# Patient Record
Sex: FEMALE | Race: WHITE | NOT HISPANIC OR LATINO | Employment: OTHER | URBAN - METROPOLITAN AREA
[De-identification: names, ages, dates, MRNs, and addresses within clinical notes are randomized per-mention and may not be internally consistent; named-entity substitution may affect disease eponyms.]

---

## 2017-06-19 ENCOUNTER — OFFICE VISIT (OUTPATIENT)
Dept: NEUROLOGY | Facility: CLINIC | Age: 57
End: 2017-06-19

## 2017-06-19 VITALS
OXYGEN SATURATION: 98 % | HEIGHT: 68 IN | WEIGHT: 238 LBS | HEART RATE: 89 BPM | BODY MASS INDEX: 36.07 KG/M2 | DIASTOLIC BLOOD PRESSURE: 88 MMHG | SYSTOLIC BLOOD PRESSURE: 140 MMHG

## 2017-06-19 DIAGNOSIS — I67.850 CADASIL (CEREBRAL AD ARTERIOPATHY W INFARCTS AND LEUKOENCEPHALOPATHY): Primary | ICD-10-CM

## 2017-06-19 DIAGNOSIS — G43.119 INTRACTABLE MIGRAINE WITH AURA WITHOUT STATUS MIGRAINOSUS: ICD-10-CM

## 2017-06-19 PROCEDURE — 99213 OFFICE O/P EST LOW 20 MIN: CPT | Performed by: NURSE PRACTITIONER

## 2017-06-19 RX ORDER — PROMETHAZINE HYDROCHLORIDE 25 MG/1
TABLET ORAL
Qty: 30 TABLET | Refills: 0 | Status: SHIPPED | OUTPATIENT
Start: 2017-06-19 | End: 2017-11-07

## 2017-06-19 NOTE — PROGRESS NOTES
Subjective:     Patient ID: Bhavana Teixeira is a 56 y.o. female presenting for follow up for CADASIL and migraines. The patient saw Dr. Boyd on 2016. She is a previous patient of Dr. Brown and Dr. Toth. She has a long standing history of migraines. They previously were occurring about twice per week but recently she reports only about one per month. Dr. Boyd took her off of Imitrex due to the increased risk of stroke and started her on promethazine. The patient states the prescription was never sent so she has not tried this. She did stop the Imitrex though.     She has some progressive memory problems over the last couple of years but denies episodes of one-sided weakness or numbness or visual loss. She has had MRI scans of her brain showing very prominent white matter disease consistent with CADASIL per report. There is no reference to a notch 3 lab test result. She has a family history of CADASIL including her older sister, who  at age 50. Her youngest sister also has it and her brother at age 63 is severely incapacitated with dementia and is wheelchair-bound due to CADASIL. She takes Namenda 5 mg twice a day and donepezil 10 mg daily for memory loss. She has a history of seizures associated with CADASILShe is also on Zonegran 300 mg daily in divided doses, Keppra 1.5 tablets twice daily and Neurontin 600 mg twice a day.     MRI 2015 with DWI abnormality left frontal and parietal regions. Repeat MRI 2016 with no acute changes. Findings consistent with CADASIL.     She has a history of outbursts of crying which she was placed on Prozac 20 mg daily which has helped. Dr. Boyd discussed the possibility of adding Nuedexta with her at the last visit and she chose not to at this time but will consider in the future if needed.     Memory Loss   Associated symptoms include abdominal pain, fatigue, headaches and neck pain. Pertinent negatives include no chest pain, nausea, numbness, vomiting or  weakness.     The following portions of the patient's history were reviewed and updated as appropriate: allergies, current medications, past family history, past medical history, past social history, past surgical history and problem list.    Review of Systems   Constitutional: Positive for fatigue. Negative for activity change and appetite change.   HENT: Negative for facial swelling, hearing loss and trouble swallowing.    Eyes: Positive for pain. Negative for photophobia and visual disturbance.   Respiratory: Negative for choking, chest tightness and shortness of breath.    Cardiovascular: Negative for chest pain and leg swelling.   Gastrointestinal: Positive for abdominal pain. Negative for nausea and vomiting.   Endocrine: Positive for polydipsia. Negative for polyphagia.   Genitourinary: Positive for difficulty urinating and frequency.   Musculoskeletal: Positive for back pain, gait problem and neck pain.   Neurological: Positive for tremors and headaches. Negative for dizziness, seizures, syncope, facial asymmetry, speech difficulty, weakness, light-headedness and numbness.   Hematological: Negative for adenopathy. Does not bruise/bleed easily.   Psychiatric/Behavioral: Positive for confusion and decreased concentration. Negative for agitation, behavioral problems, dysphoric mood, hallucinations, self-injury, sleep disturbance and suicidal ideas. The patient is not nervous/anxious and is not hyperactive.         Objective:    Neurologic Exam  Mental status, awake, alert, oriented, and conversant. No evidence for pseudobulbar affect or depression.  HCF: No aphasia. MMSE last visit was 27/30, not repeated today.   Cranial nerves: 2-12 intact.  Motor: Normal tone, bulk, and power diffusely.  Reflexes 1 diffusely.  Sensory: Light touch and pinprick intact throughout.   Cerebellar: finger-to-nose and rapid movements intact. No tremor noted.  Gait and station: Broad based, reduced arm swing bilaterally. Negative  Romberg.    Physical Exam  General appearance: Obese white female in no acute distress.  HEENT: Normocephalic, no evidence of trauma.   Neck: Supple. No thyromegaly.  Heart: Regular rate and rhythm no murmurs.    Assessment/Plan:     Bhavana was seen today for cadasil and memory loss.    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       1. CADASIL - no specific treatment available. Continue Aricept 10 mg daily and Namenda 5 mg daily. Continue zonisamide 300 mg daily single nightly dose. Continue Keppra 750 mg twice daily and gabapentin 600 mg twice daily. Continue ASA 81 mg daily. She did not stop amantadine as discussed with Dr. Boyd at her last visit. She will stop this now and see if her tremor clearly responding. Will resend promethazine 25 mg every 6 hours as needed for migraine headaches. She is to take this in place of Imitrex. Continue Fluoxetine and Meloxicam via primary MD. F/U 6 months.

## 2017-11-07 ENCOUNTER — APPOINTMENT (OUTPATIENT)
Dept: CT IMAGING | Facility: HOSPITAL | Age: 57
End: 2017-11-07

## 2017-11-07 ENCOUNTER — TELEPHONE (OUTPATIENT)
Dept: NEUROLOGY | Facility: CLINIC | Age: 57
End: 2017-11-07

## 2017-11-07 ENCOUNTER — HOSPITAL ENCOUNTER (OUTPATIENT)
Facility: HOSPITAL | Age: 57
Setting detail: OBSERVATION
Discharge: HOME | End: 2017-11-09
Attending: EMERGENCY MEDICINE | Admitting: INTERNAL MEDICINE

## 2017-11-07 DIAGNOSIS — I67.850 CEREBRAL AUTOSOMAL DOMINANT ARTERIOPATHY WITH SUBCORTICAL INFARCTS AND LEUKOENCEPHALOPATHY: ICD-10-CM

## 2017-11-07 DIAGNOSIS — G45.1 HEMISPHERIC CAROTID ARTERY SYNDROME: Primary | ICD-10-CM

## 2017-11-07 LAB
ALBUMIN SERPL-MCNC: 4.7 G/DL (ref 3.5–5.2)
ALBUMIN/GLOB SERPL: 1.3 G/DL
ALP SERPL-CCNC: 143 U/L (ref 39–117)
ALT SERPL W P-5'-P-CCNC: 10 U/L (ref 1–33)
ANION GAP SERPL CALCULATED.3IONS-SCNC: 12.5 MMOL/L
AST SERPL-CCNC: 19 U/L (ref 1–32)
BASOPHILS # BLD AUTO: 0.02 10*3/MM3 (ref 0–0.2)
BASOPHILS NFR BLD AUTO: 0.3 % (ref 0–1.5)
BILIRUB SERPL-MCNC: 0.2 MG/DL (ref 0.1–1.2)
BUN BLD-MCNC: 16 MG/DL (ref 6–20)
BUN/CREAT SERPL: 17 (ref 7–25)
CALCIUM SPEC-SCNC: 9.1 MG/DL (ref 8.6–10.5)
CHLORIDE SERPL-SCNC: 107 MMOL/L (ref 98–107)
CO2 SERPL-SCNC: 26.5 MMOL/L (ref 22–29)
CREAT BLD-MCNC: 0.94 MG/DL (ref 0.57–1)
DEPRECATED RDW RBC AUTO: 42.8 FL (ref 37–54)
EOSINOPHIL # BLD AUTO: 0.2 10*3/MM3 (ref 0–0.7)
EOSINOPHIL NFR BLD AUTO: 3.2 % (ref 0.3–6.2)
ERYTHROCYTE [DISTWIDTH] IN BLOOD BY AUTOMATED COUNT: 13.4 % (ref 11.7–13)
GFR SERPL CREATININE-BSD FRML MDRD: 61 ML/MIN/1.73
GLOBULIN UR ELPH-MCNC: 3.5 GM/DL
GLUCOSE BLD-MCNC: 137 MG/DL (ref 65–99)
HCT VFR BLD AUTO: 45.1 % (ref 35.6–45.5)
HGB BLD-MCNC: 14.4 G/DL (ref 11.9–15.5)
IMM GRANULOCYTES # BLD: 0 10*3/MM3 (ref 0–0.03)
IMM GRANULOCYTES NFR BLD: 0 % (ref 0–0.5)
INR PPP: 1.04 (ref 0.9–1.1)
LYMPHOCYTES # BLD AUTO: 1.89 10*3/MM3 (ref 0.9–4.8)
LYMPHOCYTES NFR BLD AUTO: 30.6 % (ref 19.6–45.3)
MCH RBC QN AUTO: 27.8 PG (ref 26.9–32)
MCHC RBC AUTO-ENTMCNC: 31.9 G/DL (ref 32.4–36.3)
MCV RBC AUTO: 87.1 FL (ref 80.5–98.2)
MONOCYTES # BLD AUTO: 0.45 10*3/MM3 (ref 0.2–1.2)
MONOCYTES NFR BLD AUTO: 7.3 % (ref 5–12)
NEUTROPHILS # BLD AUTO: 3.62 10*3/MM3 (ref 1.9–8.1)
NEUTROPHILS NFR BLD AUTO: 58.6 % (ref 42.7–76)
PLATELET # BLD AUTO: 202 10*3/MM3 (ref 140–500)
PMV BLD AUTO: 11.2 FL (ref 6–12)
POTASSIUM BLD-SCNC: 3.5 MMOL/L (ref 3.5–5.2)
PROT SERPL-MCNC: 8.2 G/DL (ref 6–8.5)
PROTHROMBIN TIME: 13.2 SECONDS (ref 11.7–14.2)
RBC # BLD AUTO: 5.18 10*6/MM3 (ref 3.9–5.2)
SODIUM BLD-SCNC: 146 MMOL/L (ref 136–145)
WBC NRBC COR # BLD: 6.18 10*3/MM3 (ref 4.5–10.7)

## 2017-11-07 PROCEDURE — 80053 COMPREHEN METABOLIC PANEL: CPT | Performed by: EMERGENCY MEDICINE

## 2017-11-07 PROCEDURE — G0378 HOSPITAL OBSERVATION PER HR: HCPCS

## 2017-11-07 PROCEDURE — 85025 COMPLETE CBC W/AUTO DIFF WBC: CPT | Performed by: EMERGENCY MEDICINE

## 2017-11-07 PROCEDURE — 99284 EMERGENCY DEPT VISIT MOD MDM: CPT

## 2017-11-07 PROCEDURE — 85610 PROTHROMBIN TIME: CPT | Performed by: EMERGENCY MEDICINE

## 2017-11-07 PROCEDURE — 70450 CT HEAD/BRAIN W/O DYE: CPT

## 2017-11-07 RX ORDER — ASPIRIN 325 MG
325 TABLET ORAL ONCE
Status: COMPLETED | OUTPATIENT
Start: 2017-11-07 | End: 2017-11-07

## 2017-11-07 RX ORDER — ACETAMINOPHEN 650 MG/1
650 SUPPOSITORY RECTAL EVERY 4 HOURS PRN
Status: DISCONTINUED | OUTPATIENT
Start: 2017-11-07 | End: 2017-11-09 | Stop reason: HOSPADM

## 2017-11-07 RX ORDER — LANSOPRAZOLE 30 MG/1
30 CAPSULE, DELAYED RELEASE ORAL DAILY
COMMUNITY

## 2017-11-07 RX ORDER — CYANOCOBALAMIN 1000 UG/ML
1000 INJECTION, SOLUTION INTRAMUSCULAR; SUBCUTANEOUS
COMMUNITY

## 2017-11-07 RX ORDER — SODIUM CHLORIDE 0.9 % (FLUSH) 0.9 %
1-10 SYRINGE (ML) INJECTION AS NEEDED
Status: DISCONTINUED | OUTPATIENT
Start: 2017-11-07 | End: 2017-11-09 | Stop reason: HOSPADM

## 2017-11-07 RX ORDER — ATORVASTATIN CALCIUM 80 MG/1
80 TABLET, FILM COATED ORAL NIGHTLY
Status: DISCONTINUED | OUTPATIENT
Start: 2017-11-08 | End: 2017-11-08

## 2017-11-07 RX ORDER — LEVOTHYROXINE SODIUM 0.12 MG/1
125 TABLET ORAL DAILY
COMMUNITY

## 2017-11-07 RX ORDER — BISACODYL 5 MG/1
5 TABLET, DELAYED RELEASE ORAL DAILY PRN
Status: DISCONTINUED | OUTPATIENT
Start: 2017-11-07 | End: 2017-11-09 | Stop reason: HOSPADM

## 2017-11-07 RX ORDER — CALCIUM CARBONATE 200(500)MG
2 TABLET,CHEWABLE ORAL 2 TIMES DAILY PRN
Status: DISCONTINUED | OUTPATIENT
Start: 2017-11-07 | End: 2017-11-09 | Stop reason: HOSPADM

## 2017-11-07 RX ORDER — MAGNESIUM OXIDE 400 MG/1
400 TABLET ORAL DAILY
COMMUNITY

## 2017-11-07 RX ORDER — SODIUM CHLORIDE 9 MG/ML
75 INJECTION, SOLUTION INTRAVENOUS CONTINUOUS
Status: DISCONTINUED | OUTPATIENT
Start: 2017-11-08 | End: 2017-11-09 | Stop reason: HOSPADM

## 2017-11-07 RX ORDER — ACETAMINOPHEN 325 MG/1
650 TABLET ORAL EVERY 4 HOURS PRN
Status: DISCONTINUED | OUTPATIENT
Start: 2017-11-07 | End: 2017-11-09 | Stop reason: HOSPADM

## 2017-11-07 RX ORDER — ASPIRIN 325 MG
325 TABLET ORAL DAILY
Status: DISCONTINUED | OUTPATIENT
Start: 2017-11-08 | End: 2017-11-08

## 2017-11-07 RX ORDER — ASPIRIN 300 MG/1
300 SUPPOSITORY RECTAL DAILY
Status: DISCONTINUED | OUTPATIENT
Start: 2017-11-08 | End: 2017-11-08

## 2017-11-07 RX ORDER — ONDANSETRON 4 MG/1
4 TABLET, FILM COATED ORAL EVERY 6 HOURS PRN
Status: DISCONTINUED | OUTPATIENT
Start: 2017-11-07 | End: 2017-11-09 | Stop reason: HOSPADM

## 2017-11-07 RX ORDER — MELOXICAM 15 MG/1
15 TABLET ORAL DAILY
COMMUNITY
End: 2017-11-09 | Stop reason: HOSPADM

## 2017-11-07 RX ORDER — ONDANSETRON 4 MG/1
4 TABLET, ORALLY DISINTEGRATING ORAL EVERY 6 HOURS PRN
Status: DISCONTINUED | OUTPATIENT
Start: 2017-11-07 | End: 2017-11-09 | Stop reason: HOSPADM

## 2017-11-07 RX ORDER — ONDANSETRON 2 MG/ML
4 INJECTION INTRAMUSCULAR; INTRAVENOUS EVERY 6 HOURS PRN
Status: DISCONTINUED | OUTPATIENT
Start: 2017-11-07 | End: 2017-11-09 | Stop reason: HOSPADM

## 2017-11-07 RX ORDER — SODIUM CHLORIDE 0.9 % (FLUSH) 0.9 %
10 SYRINGE (ML) INJECTION AS NEEDED
Status: DISCONTINUED | OUTPATIENT
Start: 2017-11-07 | End: 2017-11-07

## 2017-11-07 RX ORDER — NITROGLYCERIN 0.4 MG/1
0.4 TABLET SUBLINGUAL
Status: DISCONTINUED | OUTPATIENT
Start: 2017-11-07 | End: 2017-11-09 | Stop reason: HOSPADM

## 2017-11-07 RX ORDER — ZONISAMIDE 100 MG/1
300 CAPSULE ORAL NIGHTLY
COMMUNITY
End: 2018-08-03 | Stop reason: SDUPTHER

## 2017-11-07 RX ADMIN — ASPIRIN 325 MG: 325 TABLET ORAL at 21:40

## 2017-11-07 RX ADMIN — SODIUM CHLORIDE 1000 ML: 9 INJECTION, SOLUTION INTRAVENOUS at 20:17

## 2017-11-07 RX ADMIN — SODIUM CHLORIDE 75 ML/HR: 9 INJECTION, SOLUTION INTRAVENOUS at 23:45

## 2017-11-07 NOTE — TELEPHONE ENCOUNTER
I spoke with the patient.  She has new symptoms.  There is some dysarthria and some right-sided weakness which appear new.  She has a lot of crying episodes which is more nonspecific from her pseudobulbar affect.    She had previously been evaluated and followed in the [facility] system.  We don't have any radiographs in [facility].  I told her that this should be evaluated in the emergency room.  She is on aspirin.    Rg

## 2017-11-08 ENCOUNTER — APPOINTMENT (OUTPATIENT)
Dept: MRI IMAGING | Facility: HOSPITAL | Age: 57
End: 2017-11-08
Attending: INTERNAL MEDICINE

## 2017-11-08 ENCOUNTER — APPOINTMENT (OUTPATIENT)
Dept: CARDIOLOGY | Facility: HOSPITAL | Age: 57
End: 2017-11-08
Attending: INTERNAL MEDICINE

## 2017-11-08 PROBLEM — R73.9 HYPERGLYCEMIA: Status: ACTIVE | Noted: 2017-11-08

## 2017-11-08 PROBLEM — E87.0 HYPERNATREMIA: Status: ACTIVE | Noted: 2017-11-08

## 2017-11-08 PROBLEM — Z66 DNR (DO NOT RESUSCITATE): Status: ACTIVE | Noted: 2017-11-08

## 2017-11-08 PROBLEM — G45.9 TRANSIENT ISCHEMIC ATTACK: Status: ACTIVE | Noted: 2017-11-08

## 2017-11-08 LAB
ANION GAP SERPL CALCULATED.3IONS-SCNC: 14.8 MMOL/L
ASCENDING AORTA: 3.3 CM
BASOPHILS # BLD AUTO: 0.02 10*3/MM3 (ref 0–0.2)
BASOPHILS NFR BLD AUTO: 0.3 % (ref 0–1.5)
BH CV ECHO MEAS - ACS: 2.3 CM
BH CV ECHO MEAS - AO MAX PG (FULL): 4.4 MMHG
BH CV ECHO MEAS - AO MAX PG: 6.3 MMHG
BH CV ECHO MEAS - AO MEAN PG (FULL): 2 MMHG
BH CV ECHO MEAS - AO MEAN PG: 3 MMHG
BH CV ECHO MEAS - AO ROOT AREA (BSA CORRECTED): 1.4
BH CV ECHO MEAS - AO ROOT AREA: 8 CM^2
BH CV ECHO MEAS - AO ROOT DIAM: 3.2 CM
BH CV ECHO MEAS - AO V2 MAX: 125 CM/SEC
BH CV ECHO MEAS - AO V2 MEAN: 85.1 CM/SEC
BH CV ECHO MEAS - AO V2 VTI: 27 CM
BH CV ECHO MEAS - ASC AORTA: 3.3 CM
BH CV ECHO MEAS - AVA(I,A): 1.9 CM^2
BH CV ECHO MEAS - AVA(I,D): 1.9 CM^2
BH CV ECHO MEAS - AVA(V,A): 1.7 CM^2
BH CV ECHO MEAS - AVA(V,D): 1.7 CM^2
BH CV ECHO MEAS - BSA(HAYCOCK): 2.3 M^2
BH CV ECHO MEAS - BSA: 2.2 M^2
BH CV ECHO MEAS - BZI_BMI: 36.8 KILOGRAMS/M^2
BH CV ECHO MEAS - BZI_METRIC_HEIGHT: 172.7 CM
BH CV ECHO MEAS - BZI_METRIC_WEIGHT: 109.8 KG
BH CV ECHO MEAS - CONTRAST EF (2CH): 63.4 ML/M^2
BH CV ECHO MEAS - CONTRAST EF 4CH: 62.1 ML/M^2
BH CV ECHO MEAS - EDV(CUBED): 148.9 ML
BH CV ECHO MEAS - EDV(MOD-SP2): 123 ML
BH CV ECHO MEAS - EDV(MOD-SP4): 132 ML
BH CV ECHO MEAS - EDV(TEICH): 135.3 ML
BH CV ECHO MEAS - EF(CUBED): 73.6 %
BH CV ECHO MEAS - EF(MOD-SP2): 63.4 %
BH CV ECHO MEAS - EF(MOD-SP4): 62.1 %
BH CV ECHO MEAS - EF(TEICH): 65 %
BH CV ECHO MEAS - ESV(CUBED): 39.3 ML
BH CV ECHO MEAS - ESV(MOD-SP2): 45 ML
BH CV ECHO MEAS - ESV(MOD-SP4): 50 ML
BH CV ECHO MEAS - ESV(TEICH): 47.4 ML
BH CV ECHO MEAS - FS: 35.8 %
BH CV ECHO MEAS - IVS/LVPW: 1
BH CV ECHO MEAS - IVSD: 1.1 CM
BH CV ECHO MEAS - LAT PEAK E' VEL: 8 CM/SEC
BH CV ECHO MEAS - LV DIASTOLIC VOL/BSA (35-75): 59.6 ML/M^2
BH CV ECHO MEAS - LV MASS(C)D: 227.7 GRAMS
BH CV ECHO MEAS - LV MASS(C)DI: 102.8 GRAMS/M^2
BH CV ECHO MEAS - LV MAX PG: 1.9 MMHG
BH CV ECHO MEAS - LV MEAN PG: 1 MMHG
BH CV ECHO MEAS - LV SYSTOLIC VOL/BSA (12-30): 22.6 ML/M^2
BH CV ECHO MEAS - LV V1 MAX: 68.7 CM/SEC
BH CV ECHO MEAS - LV V1 MEAN: 46.2 CM/SEC
BH CV ECHO MEAS - LV V1 VTI: 16.6 CM
BH CV ECHO MEAS - LVIDD: 5.3 CM
BH CV ECHO MEAS - LVIDS: 3.4 CM
BH CV ECHO MEAS - LVLD AP2: 8.6 CM
BH CV ECHO MEAS - LVLD AP4: 8.7 CM
BH CV ECHO MEAS - LVLS AP2: 6.6 CM
BH CV ECHO MEAS - LVLS AP4: 7.6 CM
BH CV ECHO MEAS - LVOT AREA (M): 3.1 CM^2
BH CV ECHO MEAS - LVOT AREA: 3.1 CM^2
BH CV ECHO MEAS - LVOT DIAM: 2 CM
BH CV ECHO MEAS - LVPWD: 1.1 CM
BH CV ECHO MEAS - MED PEAK E' VEL: 6 CM/SEC
BH CV ECHO MEAS - MV A DUR: 0.14 SEC
BH CV ECHO MEAS - MV A MAX VEL: 62.1 CM/SEC
BH CV ECHO MEAS - MV DEC SLOPE: 238 CM/SEC^2
BH CV ECHO MEAS - MV DEC TIME: 0.34 SEC
BH CV ECHO MEAS - MV E MAX VEL: 57.2 CM/SEC
BH CV ECHO MEAS - MV E/A: 0.92
BH CV ECHO MEAS - MV MAX PG: 2.3 MMHG
BH CV ECHO MEAS - MV MEAN PG: 1 MMHG
BH CV ECHO MEAS - MV P1/2T MAX VEL: 79.4 CM/SEC
BH CV ECHO MEAS - MV P1/2T: 97.7 MSEC
BH CV ECHO MEAS - MV V2 MAX: 75.3 CM/SEC
BH CV ECHO MEAS - MV V2 MEAN: 47.1 CM/SEC
BH CV ECHO MEAS - MV V2 VTI: 27.2 CM
BH CV ECHO MEAS - MVA P1/2T LCG: 2.8 CM^2
BH CV ECHO MEAS - MVA(P1/2T): 2.3 CM^2
BH CV ECHO MEAS - MVA(VTI): 1.9 CM^2
BH CV ECHO MEAS - PA ACC TIME: 0.1 SEC
BH CV ECHO MEAS - PA MAX PG (FULL): 1.6 MMHG
BH CV ECHO MEAS - PA MAX PG: 2.5 MMHG
BH CV ECHO MEAS - PA PR(ACCEL): 33.1 MMHG
BH CV ECHO MEAS - PA V2 MAX: 78.7 CM/SEC
BH CV ECHO MEAS - PULM A REVS DUR: 0.12 SEC
BH CV ECHO MEAS - PULM A REVS VEL: 37.2 CM/SEC
BH CV ECHO MEAS - PULM DIAS VEL: 34.9 CM/SEC
BH CV ECHO MEAS - PULM S/D: 0.85
BH CV ECHO MEAS - PULM SYS VEL: 29.8 CM/SEC
BH CV ECHO MEAS - PVA(V,A): 2.7 CM^2
BH CV ECHO MEAS - PVA(V,D): 2.7 CM^2
BH CV ECHO MEAS - QP/QS: 0.97
BH CV ECHO MEAS - RAP SYSTOLE: 8 MMHG
BH CV ECHO MEAS - RV MAX PG: 0.89 MMHG
BH CV ECHO MEAS - RV MEAN PG: 0 MMHG
BH CV ECHO MEAS - RV V1 MAX: 47.3 CM/SEC
BH CV ECHO MEAS - RV V1 MEAN: 26.9 CM/SEC
BH CV ECHO MEAS - RV V1 VTI: 11.2 CM
BH CV ECHO MEAS - RVOT AREA: 4.5 CM^2
BH CV ECHO MEAS - RVOT DIAM: 2.4 CM
BH CV ECHO MEAS - SI(AO): 98 ML/M^2
BH CV ECHO MEAS - SI(CUBED): 49.4 ML/M^2
BH CV ECHO MEAS - SI(LVOT): 23.5 ML/M^2
BH CV ECHO MEAS - SI(MOD-SP2): 35.2 ML/M^2
BH CV ECHO MEAS - SI(MOD-SP4): 37 ML/M^2
BH CV ECHO MEAS - SI(TEICH): 39.7 ML/M^2
BH CV ECHO MEAS - SUP REN AO DIAM: 2.2 CM
BH CV ECHO MEAS - SV(AO): 217.1 ML
BH CV ECHO MEAS - SV(CUBED): 109.6 ML
BH CV ECHO MEAS - SV(LVOT): 52.2 ML
BH CV ECHO MEAS - SV(MOD-SP2): 78 ML
BH CV ECHO MEAS - SV(MOD-SP4): 82 ML
BH CV ECHO MEAS - SV(RVOT): 50.7 ML
BH CV ECHO MEAS - SV(TEICH): 87.9 ML
BH CV ECHO MEAS - TAPSE (>1.6): 3 CM2
BH CV VAS BP RIGHT ARM: NORMAL MMHG
BH CV XLRA - RV BASE: 3.6 CM
BH CV XLRA - TDI S': 13 CM/SEC
BUN BLD-MCNC: 12 MG/DL (ref 6–20)
BUN/CREAT SERPL: 12.9 (ref 7–25)
CALCIUM SPEC-SCNC: 9.1 MG/DL (ref 8.6–10.5)
CHLORIDE SERPL-SCNC: 108 MMOL/L (ref 98–107)
CHOLEST SERPL-MCNC: 152 MG/DL (ref 0–200)
CO2 SERPL-SCNC: 18.2 MMOL/L (ref 22–29)
CREAT BLD-MCNC: 0.93 MG/DL (ref 0.57–1)
DEPRECATED RDW RBC AUTO: 43.4 FL (ref 37–54)
E/E' RATIO: 9
EOSINOPHIL # BLD AUTO: 0.13 10*3/MM3 (ref 0–0.7)
EOSINOPHIL NFR BLD AUTO: 1.9 % (ref 0.3–6.2)
ERYTHROCYTE [DISTWIDTH] IN BLOOD BY AUTOMATED COUNT: 13.6 % (ref 11.7–13)
GFR SERPL CREATININE-BSD FRML MDRD: 62 ML/MIN/1.73
GLUCOSE BLD-MCNC: 120 MG/DL (ref 65–99)
HBA1C MFR BLD: 5.7 % (ref 4.8–5.6)
HCT VFR BLD AUTO: 43.6 % (ref 35.6–45.5)
HDLC SERPL-MCNC: 58 MG/DL (ref 40–60)
HGB BLD-MCNC: 13.8 G/DL (ref 11.9–15.5)
IMM GRANULOCYTES # BLD: 0.02 10*3/MM3 (ref 0–0.03)
IMM GRANULOCYTES NFR BLD: 0.3 % (ref 0–0.5)
LDLC SERPL CALC-MCNC: 77 MG/DL (ref 0–100)
LDLC/HDLC SERPL: 1.33 {RATIO}
LEFT ATRIUM VOLUME INDEX: 23 ML/M2
LV EF 2D ECHO EST: 62 %
LYMPHOCYTES # BLD AUTO: 1.82 10*3/MM3 (ref 0.9–4.8)
LYMPHOCYTES NFR BLD AUTO: 26.1 % (ref 19.6–45.3)
MAXIMAL PREDICTED HEART RATE: 161 BPM
MCH RBC QN AUTO: 27.8 PG (ref 26.9–32)
MCHC RBC AUTO-ENTMCNC: 31.7 G/DL (ref 32.4–36.3)
MCV RBC AUTO: 87.9 FL (ref 80.5–98.2)
MONOCYTES # BLD AUTO: 0.52 10*3/MM3 (ref 0.2–1.2)
MONOCYTES NFR BLD AUTO: 7.5 % (ref 5–12)
NEUTROPHILS # BLD AUTO: 4.45 10*3/MM3 (ref 1.9–8.1)
NEUTROPHILS NFR BLD AUTO: 63.9 % (ref 42.7–76)
PLATELET # BLD AUTO: 188 10*3/MM3 (ref 140–500)
PMV BLD AUTO: 11.2 FL (ref 6–12)
POTASSIUM BLD-SCNC: 3.6 MMOL/L (ref 3.5–5.2)
RBC # BLD AUTO: 4.96 10*6/MM3 (ref 3.9–5.2)
SODIUM BLD-SCNC: 141 MMOL/L (ref 136–145)
STRESS TARGET HR: 137 BPM
TRIGL SERPL-MCNC: 84 MG/DL (ref 0–150)
TSH SERPL DL<=0.05 MIU/L-ACNC: 1.6 MIU/ML (ref 0.27–4.2)
VLDLC SERPL-MCNC: 16.8 MG/DL (ref 5–40)
WBC NRBC COR # BLD: 6.96 10*3/MM3 (ref 4.5–10.7)

## 2017-11-08 PROCEDURE — 92610 EVALUATE SWALLOWING FUNCTION: CPT

## 2017-11-08 PROCEDURE — G0378 HOSPITAL OBSERVATION PER HR: HCPCS

## 2017-11-08 PROCEDURE — 83036 HEMOGLOBIN GLYCOSYLATED A1C: CPT | Performed by: INTERNAL MEDICINE

## 2017-11-08 PROCEDURE — G8978 MOBILITY CURRENT STATUS: HCPCS

## 2017-11-08 PROCEDURE — A9577 INJ MULTIHANCE: HCPCS | Performed by: INTERNAL MEDICINE

## 2017-11-08 PROCEDURE — 93306 TTE W/DOPPLER COMPLETE: CPT

## 2017-11-08 PROCEDURE — G8997 SWALLOW GOAL STATUS: HCPCS

## 2017-11-08 PROCEDURE — 85025 COMPLETE CBC W/AUTO DIFF WBC: CPT | Performed by: INTERNAL MEDICINE

## 2017-11-08 PROCEDURE — 70549 MR ANGIOGRAPH NECK W/O&W/DYE: CPT

## 2017-11-08 PROCEDURE — 99221 1ST HOSP IP/OBS SF/LOW 40: CPT | Performed by: PSYCHIATRY & NEUROLOGY

## 2017-11-08 PROCEDURE — 80048 BASIC METABOLIC PNL TOTAL CA: CPT | Performed by: INTERNAL MEDICINE

## 2017-11-08 PROCEDURE — 84443 ASSAY THYROID STIM HORMONE: CPT | Performed by: INTERNAL MEDICINE

## 2017-11-08 PROCEDURE — 93306 TTE W/DOPPLER COMPLETE: CPT | Performed by: INTERNAL MEDICINE

## 2017-11-08 PROCEDURE — G8998 SWALLOW D/C STATUS: HCPCS

## 2017-11-08 PROCEDURE — 70553 MRI BRAIN STEM W/O & W/DYE: CPT

## 2017-11-08 PROCEDURE — 97161 PT EVAL LOW COMPLEX 20 MIN: CPT

## 2017-11-08 PROCEDURE — G8996 SWALLOW CURRENT STATUS: HCPCS

## 2017-11-08 PROCEDURE — 25010000002 PERFLUTREN (DEFINITY) 8.476 MG IN SODIUM CHLORIDE 0.9 % 10 ML INJECTION: Performed by: INTERNAL MEDICINE

## 2017-11-08 PROCEDURE — 70544 MR ANGIOGRAPHY HEAD W/O DYE: CPT

## 2017-11-08 PROCEDURE — 80061 LIPID PANEL: CPT | Performed by: INTERNAL MEDICINE

## 2017-11-08 PROCEDURE — 0 GADOBENATE DIMEGLUMINE 529 MG/ML SOLUTION: Performed by: INTERNAL MEDICINE

## 2017-11-08 PROCEDURE — G8979 MOBILITY GOAL STATUS: HCPCS

## 2017-11-08 PROCEDURE — 97110 THERAPEUTIC EXERCISES: CPT

## 2017-11-08 PROCEDURE — G8980 MOBILITY D/C STATUS: HCPCS

## 2017-11-08 RX ORDER — FLUTICASONE PROPIONATE 50 MCG
2 SPRAY, SUSPENSION (ML) NASAL DAILY
Status: DISCONTINUED | OUTPATIENT
Start: 2017-11-08 | End: 2017-11-09 | Stop reason: HOSPADM

## 2017-11-08 RX ORDER — PANTOPRAZOLE SODIUM 40 MG/1
40 TABLET, DELAYED RELEASE ORAL
Status: DISCONTINUED | OUTPATIENT
Start: 2017-11-08 | End: 2017-11-09 | Stop reason: HOSPADM

## 2017-11-08 RX ORDER — ZONISAMIDE 100 MG/1
300 CAPSULE ORAL NIGHTLY
Status: DISCONTINUED | OUTPATIENT
Start: 2017-11-08 | End: 2017-11-09 | Stop reason: HOSPADM

## 2017-11-08 RX ORDER — DONEPEZIL HYDROCHLORIDE 10 MG/1
10 TABLET, FILM COATED ORAL NIGHTLY
Status: DISCONTINUED | OUTPATIENT
Start: 2017-11-08 | End: 2017-11-09 | Stop reason: HOSPADM

## 2017-11-08 RX ORDER — LEVETIRACETAM 250 MG/1
250 TABLET ORAL 2 TIMES DAILY
Status: DISCONTINUED | OUTPATIENT
Start: 2017-11-08 | End: 2017-11-09 | Stop reason: HOSPADM

## 2017-11-08 RX ORDER — GABAPENTIN 300 MG/1
600 CAPSULE ORAL EVERY 12 HOURS SCHEDULED
Status: DISCONTINUED | OUTPATIENT
Start: 2017-11-08 | End: 2017-11-09 | Stop reason: HOSPADM

## 2017-11-08 RX ORDER — MAGNESIUM OXIDE 400 MG/1
400 TABLET ORAL DAILY
Status: DISCONTINUED | OUTPATIENT
Start: 2017-11-08 | End: 2017-11-09 | Stop reason: HOSPADM

## 2017-11-08 RX ORDER — LEVOTHYROXINE SODIUM 0.12 MG/1
125 TABLET ORAL
Status: DISCONTINUED | OUTPATIENT
Start: 2017-11-08 | End: 2017-11-09 | Stop reason: HOSPADM

## 2017-11-08 RX ORDER — MEMANTINE HYDROCHLORIDE 5 MG/1
5 TABLET ORAL 2 TIMES DAILY
Status: DISCONTINUED | OUTPATIENT
Start: 2017-11-08 | End: 2017-11-09 | Stop reason: HOSPADM

## 2017-11-08 RX ORDER — CLOPIDOGREL BISULFATE 75 MG/1
75 TABLET ORAL DAILY
Status: DISCONTINUED | OUTPATIENT
Start: 2017-11-08 | End: 2017-11-09 | Stop reason: HOSPADM

## 2017-11-08 RX ORDER — ATORVASTATIN CALCIUM 20 MG/1
20 TABLET, FILM COATED ORAL NIGHTLY
Status: DISCONTINUED | OUTPATIENT
Start: 2017-11-08 | End: 2017-11-09 | Stop reason: HOSPADM

## 2017-11-08 RX ORDER — OXYBUTYNIN CHLORIDE 10 MG/1
10 TABLET, EXTENDED RELEASE ORAL DAILY
Status: DISCONTINUED | OUTPATIENT
Start: 2017-11-08 | End: 2017-11-09 | Stop reason: HOSPADM

## 2017-11-08 RX ORDER — FLUOXETINE HYDROCHLORIDE 20 MG/1
20 CAPSULE ORAL DAILY
Status: DISCONTINUED | OUTPATIENT
Start: 2017-11-08 | End: 2017-11-09 | Stop reason: HOSPADM

## 2017-11-08 RX ADMIN — DONEPEZIL HYDROCHLORIDE 10 MG: 10 TABLET, FILM COATED ORAL at 21:25

## 2017-11-08 RX ADMIN — LEVETIRACETAM 250 MG: 250 TABLET, FILM COATED ORAL at 09:29

## 2017-11-08 RX ADMIN — GADOBENATE DIMEGLUMINE 20 ML: 529 INJECTION, SOLUTION INTRAVENOUS at 08:40

## 2017-11-08 RX ADMIN — GABAPENTIN 600 MG: 300 CAPSULE ORAL at 21:25

## 2017-11-08 RX ADMIN — Medication 400 MG: at 09:29

## 2017-11-08 RX ADMIN — PANTOPRAZOLE SODIUM 40 MG: 40 TABLET, DELAYED RELEASE ORAL at 06:18

## 2017-11-08 RX ADMIN — ACETAMINOPHEN 650 MG: 325 TABLET ORAL at 13:26

## 2017-11-08 RX ADMIN — ZONISAMIDE 300 MG: 100 CAPSULE ORAL at 21:25

## 2017-11-08 RX ADMIN — OXYBUTYNIN CHLORIDE 10 MG: 10 TABLET, FILM COATED, EXTENDED RELEASE ORAL at 13:26

## 2017-11-08 RX ADMIN — ATORVASTATIN CALCIUM 80 MG: 80 TABLET, FILM COATED ORAL at 06:16

## 2017-11-08 RX ADMIN — MEMANTINE HYDROCHLORIDE 5 MG: 5 TABLET, FILM COATED ORAL at 21:25

## 2017-11-08 RX ADMIN — ACETAMINOPHEN 650 MG: 325 TABLET ORAL at 21:25

## 2017-11-08 RX ADMIN — PERFLUTREN 3 ML: 6.52 INJECTION, SUSPENSION INTRAVENOUS at 11:26

## 2017-11-08 RX ADMIN — ATORVASTATIN CALCIUM 20 MG: 20 TABLET, FILM COATED ORAL at 21:25

## 2017-11-08 RX ADMIN — ASPIRIN 325 MG: 325 TABLET ORAL at 09:29

## 2017-11-08 RX ADMIN — FLUTICASONE PROPIONATE 2 SPRAY: 50 SPRAY, METERED NASAL at 09:29

## 2017-11-08 RX ADMIN — LEVOTHYROXINE SODIUM 125 MCG: 125 TABLET ORAL at 06:16

## 2017-11-08 RX ADMIN — LEVETIRACETAM 250 MG: 250 TABLET, FILM COATED ORAL at 21:25

## 2017-11-08 RX ADMIN — FLUOXETINE HYDROCHLORIDE 20 MG: 20 CAPSULE ORAL at 09:29

## 2017-11-08 RX ADMIN — MEMANTINE HYDROCHLORIDE 5 MG: 5 TABLET, FILM COATED ORAL at 09:29

## 2017-11-08 RX ADMIN — CLOPIDOGREL 75 MG: 75 TABLET, FILM COATED ORAL at 23:57

## 2017-11-08 RX ADMIN — GABAPENTIN 600 MG: 300 CAPSULE ORAL at 09:29

## 2017-11-08 NOTE — PLAN OF CARE
Problem: Patient Care Overview (Adult)  Goal: Plan of Care Review    11/08/17 1402   Coping/Psychosocial Response Interventions   Plan Of Care Reviewed With patient   Outcome Evaluation   Outcome Summary/Follow up Plan Pt s/p TIA w/ R sided weakness and speech difficulties on admission. Pt did not present w/ any neurological sxs or functional impairments. Pt did not require any assistance during ambulation and demonstrated no strength or balance dysfunction. Pt does not demonstrate need for skilled acute PT at this time, therefore PT is signing off on this patient. Recommended d/c to home w/ assist.

## 2017-11-08 NOTE — PROGRESS NOTES
Name: Bhavana Teixeira ADMIT: 2017   : 1960  PCP: Jocelyn Villgaomez MD    MRN: [id number] LOS: 1 days   AGE/SEX: 57 y.o. female  ROOM: [facility]   Subjective   Subjective  Cc- right sided weakness    Weakness is resolved  Back to baseline  Does have history of CADASIC  So do family members who have passed away  On ASA 81mg as outpatient  On keppra, namenda as outpatient    ROS  No f/c  No n/v  No cp/palp  No soa/cough  Objective   Vital Signs  Temp:  [97.7 °F (36.5 °C)-98.7 °F (37.1 °C)] 98.1 °F (36.7 °C)  Heart Rate:  [54-68] 61  Resp:  [16-18] 16  BP: (136-178)/() 136/81  SpO2:  [96 %-98 %] 98 %  on   ;   O2 Device: room air  Body mass index is 36.87 kg/(m^2).    Physical Exam    Alert  Supple, no jvd  Chronically ill  RRR, no murmurs  Soft, nt  CN grossly intact  Normal gross strength  Pleasant and appropriate    Results Review:       I reviewed the patient's new clinical results.    Results from last 7 days  Lab Units 17  1430 17   WBC 10*3/mm3 6.96 6.18   HEMOGLOBIN g/dL 13.8 14.4   PLATELETS 10*3/mm3 188 202     Results from last 7 days  Lab Units 17  1430 17   SODIUM mmol/L 141 146*   POTASSIUM mmol/L 3.6 3.5   CHLORIDE mmol/L 108* 107   CO2 mmol/L 18.2* 26.5   BUN mg/dL 12 16   CREATININE mg/dL 0.93 0.94   GLUCOSE mg/dL 120* 137*   Estimated Creatinine Clearance: 84.6 mL/min (by C-G formula based on Cr of 0.93).  Results from last 7 days  Lab Units 17  1430 17   CALCIUM mg/dL 9.1 9.1   ALBUMIN g/dL  --  4.70     MRI Brain With & Without Contrast [[id number]] Not Reviewed        Order Status: Completed Collected: 17 1057        Updated: 17 105       Narrative:         MRI OF THE BRAIN WITH AND WITHOUT CONTRAST, MRA OF THE HEAD WITHOUT  CONTRAST, MRA OF THE NECK WITH AND WITHOUT CONTRAST 2017     CLINICAL HISTORY: Stroke, patient has confusion, memory loss, aphasia.  Presented yesterday with headache and right-sided  weakness.     MRI OF THE BRAIN      TECHNIQUE: Axial T1, FLAIR, fat-suppressed T2, axial diffusion and  gradient echo T2 sagittal T1 and postcontrast axial fat-suppressed T1  and coronal and sagittal T1-weighted images were obtained of the entire  head.     FINDINGS: There are extensive patchy and confluent areas of T2 high  signal throughout the periventricular and subcortical white matter of  the cerebral hemisphere and tracking into the internal and external  capsules as well as involving the central pontine white matter likely  representing severe small vessel disease that is advanced for a  57-year-old patient. Furthermore there are multiple punctate discrete  areas of encephalomalacia suggesting multiple old lacunar type infarcts  involving the anterior mid and posterior right corona radiata region,  mid left corona radiata region as well as involving the anterior right  thalamus and posterior left thalamus. Furthermore on the gradient echo  T2-weighted images there are multiple rounded small 1-4 mm foci of  signal loss consistent with multiple small foci of hemosiderin  deposition that stud the cerebellar hemispheres. There are several in  the right and left baljeet as well as in the right and left cerebral  peduncles, the thalami bilaterally and there are several scattered in  the cerebral juxtacortical white matter of the frontal and parietal  lobes. The findings are nonspecific, can be seen with hypertensive  encephalopathy. The ventricles are normal in size. There is no midline  shift. No extra-axial fluid collections are identified and no abnormal  areas of enhancement are seen in the head. The paranasal sinuses and  mastoid air cells and middle ear cavities are clear. Good flow voids are  demonstrated in the cerebral vessels and in the dural venous sinuses.          Impression:            1. No convincing acute abnormality is seen with no convincing acute  infarct identified.     2. There is extensive  confluent T2 and FLAIR hyperintensity throughout  the cerebral periventricular and subcortical white matter internal and  external capsules and involving the central pontine white matter  consistent with severe small vessel disease and there are multiple  discrete punched out small areas of encephalomalacia ranging from 2 to 6  mm in size consistent with multiple old lacunar infarcts in the right  and left corona radiata region, anterior right thalamus, posterior left  thalamus. There are also multiple discrete punched out areas of  encephalomalacia involving the genu of the corpus callosum and focus in  the posterior body of the corpus callosum.     3. There are multiple tiny 1 to 5 mm rounded foci of marked signal loss  on the gradient echo T2-weighted images consistent with multifocal  punctate nodular foci of hemosiderin deposition from old  microhemorrhages studding the brain parenchyma involving the cerebellar  hemispheres, the baljeet, cerebral peduncles, studding the thalami, and  also scattered in the juxtacortical white matter of the frontal and  parietal lobes. This can be seen with hypertensive encephalopathy. Given  the prominent white matter changes in the anterior temporal white matter  the external capsules and corpus callosum changes, one unifying  diagnosis for all the above findings in relatively young patient is  CADASIL, which is known as cerebral autosomal dominant arteriopathy with  subcortical infarcts and leukoencephalopathy. This should be correlated  clinically. I discussed the results with Dr. Anand and subsequently  called Dr. Wally Boyd with the results and he informed me that the  patient does have a known diagnosis of CADASIL.     MRA OF THE HEAD      TECHNIQUE: 3D time-of-flight images were obtained of the vertebrobasilar  system and of the [place] with maximum intensity projection  reconstructions for an MRA examination.     FINDINGS: On the MRA of the vertebrobasilar system  the visualized  portions of the distal vertebral arteries are normal in appearance from  the C2 cervical level to the vertebrobasilar junction. The posterior  inferior cerebellar arteries are normal in appearance bilaterally. The  proximal basilar artery is normal in appearance. The upper cervical and  petrous segments of the internal carotid arteries are normal in  appearance bilaterally.     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  Otherwise basilar artery and basilar tip is normal in appearance. The  posterior cerebral and superior cerebellar arteries are normal in  appearance bilaterally. The cavernous and supracavernous segments of the  internal carotid arteries are normal in appearance. The anterior and  middle cerebral arteries are normal in appearance. The anterior  communicating artery origin and middle cerebral artery trifurcations are  normal in appearance.     IMPRESSION:     Normal MRA of the head. 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        MRA OF THE NECK      TECHNIQUE: 3D time-of-flight images were obtained of the vertebrobasilar  system and of the [place] with maximum intensity projection  reconstructions for an MRA examination.     There are no prior studies for comparison.     FINDINGS: There is anatomic origin of the great vessels off the aortic  arch. The left subclavian artery origin is normal in appearance. No  stenosis is seen in the left subclavian artery from its origin to the  vertebrobasilar junction. The left common carotid origin is normal in  appearance. No stenosis is seen in the left common carotid artery and  its bifurcation into the left internal and external carotid arteries is  normal in appearance. No stenosis is seen in the left internal carotid  artery using the NASCET criteria.  The brachiocephalic artery origin is  normal IN appearance. No stenosis is seen in the brachycephalic artery  and its bifurcation into the right subclavian and common carotid  arteries are normal in appearance. No stenosis is seen in the right  subclavian artery. The right vertebral artery origin is normal in  appearance and no stenosis is seen in the right vertebral artery from  its origin to the vertebrobasilar junction. The right common carotid  origin is normal in appearance. No stenosis is seen in the right common  carotid artery. Its bifurcation into the right internal and external  carotid arteries is normal in appearance. No stenosis is seen in the  right internal carotid artery using the NASCET criteria.     IMPRESSION:     Normal MRA of the neck with no stenosis seen in the great vessels of the  neck.      Lipid Panel [[id number]] Collected: 11/08/17 1430        Lab Status: Final result Specimen: Blood Updated: 11/08/17 1501        Total Cholesterol 152 mg/dL         Triglycerides 84 mg/dL         HDL Cholesterol 58 mg/dL         LDL Cholesterol  77 mg/dL         VLDL Cholesterol 16.8 mg/dL         LDL/HDL Ratio 1.33          aspirin 325 mg Oral Daily   Or      aspirin 300 mg Rectal Daily   atorvastatin 80 mg Oral Nightly   donepezil 10 mg Oral Nightly   FLUoxetine 20 mg Oral Daily   fluticasone 2 spray Nasal Daily   gabapentin 600 mg Oral Q12H   levETIRAcetam 250 mg Oral BID   levothyroxine 125 mcg Oral Q AM   magnesium oxide 400 mg Oral Daily   memantine 5 mg Oral BID   oxybutynin XL 10 mg Oral Daily   pantoprazole 40 mg Oral Q AM   zonisamide 300 mg Oral Nightly       sodium chloride 75 mL/hr Last Rate: 75 mL/hr (11/07/17 2345)   Diet Regular      Assessment/Plan   Assessment:     Active Hospital Problems (** Indicates Principal Problem)    Diagnosis Date Noted   • **Transient ischemic attack [G45.9] 11/08/2017   • Hypernatremia [E87.0] 11/08/2017   • Hyperglycemia [R73.9] 11/08/2017   • DNR (do not  resuscitate) [Z66] 11/08/2017   • 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       Plan:   Awaiting Neurology consult. With her CADASIC seeing what optimal treatment will be. LDL only mildly elevated and was not on statin before. Will change from atorvastatin 80mg to 20mg. Probably biggest question will be ASA 325mg vs plavix. Will see what Neurology thinks. Continue other agents. Likely outpatient ST for cognitive therapy. DNR/DNI    D/W RN  D/W Dr. Ross  Reviewed previous records    Greater than 36 minutes with greater than 50% counseling and coordinating care  Disposition  Home after Neurology per Anand MD  [facility]  11/08/17  5:15 PM

## 2017-11-08 NOTE — NURSING NOTE
"Diabetes Education  Assessment/Teaching    Patient Name:  Bhavana Teixeira  YOB: 1960  MRN: [id number]  Admit Date:  11/7/2017    Assessment Date:  11/8/2017       Most Recent Value    General Information      Referral From:  Database   [order set]    Height  5' 8\" (1.727 m)    Height Method  Stated    Weight  242 lb 8.1 oz (110 kg)    What type of diabetes do you have?  Pre-diabetes [a1C 5.7 with no previous hx DM.]    Barriers to Learning  -- [none evident.]    Assessment Topics     Reducing Risk - Assessment  Needs education [pt verbalizes being aware of prediabetes status. ]    Healthy Coping - Assessment  Competent            Most Recent Value    DM Education Needs     Reducing Risks  A1C testing [wt loss] Discuss T2DM risk factors.     Healthy Coping  Appropriate    Teaching Method  Discussion, Handouts    Patient Response  Verbalized understanding        Electronically signed by:  Liliam Rae RN  11/08/17 5:00 PM  "

## 2017-11-08 NOTE — SIGNIFICANT NOTE
11/08/17 1036   Rehab Treatment   Discipline physical therapist   Rehab Evaluation   Evaluation Not Performed patient unavailable for evaluation  (off floor to cardio )   Recommendation   PT - Next Appointment 11/08/17

## 2017-11-08 NOTE — H&P
"    Name: Bhavana Teixeira ADMIT: 2017   : 1960  PCP: Jocelyn Villagomez MD    MRN: [id number] LOS: 1 days   AGE/SEX: 57 y.o. female  ROOM: 8/1     Chief Complaint   Patient presents with   • Extremity Weakness     Pt reports that she called her neurologist \"Dr. Boyd told me to come in because he thought I might be having a stroke.\"  Pt reports weakness of the right side and \"confusion of speech.\"  Per patient she does have hx of stroke and the symptoms presented three hours ago.    • Speech Problem       Subjective   Ms. Teixeira is a 57 y.o. female who presents to [facility] complaining of right-sided weakness for the past couple days which worsened at around 5095-4556 today.  She states that these episodes have been intermittent and are associated at times with dizziness and dysarthria.  She does have a history which is significant for CADASIL and has had strokes in the past as a result but denies residual neuro deficits.  She follows with Dr. Wally Boyd.       History of Present Illness    Past Medical History:   Diagnosis Date   • Arthritis    • Cervical spine disease    • Degenerative joint disease (DJD) of lumbar spine    • Depression    • Migraine    • Seasonal allergies    • Seizures    • Sleep apnea    • Thyroid disease      Past Surgical History:   Procedure Laterality Date   • BACK SURGERY     • CHOLECYSTECTOMY     • HYSTERECTOMY       Family History   Problem Relation Age of Onset   • Migraines Mother    • Migraines Father    • Multiple sclerosis Father    • Migraines Sister      Social History   Substance Use Topics   • Smoking status: Never Smoker   • Smokeless tobacco: None   • Alcohol use Yes      Comment: social      Prescriptions Prior to Admission   Medication Sig Dispense Refill Last Dose   • aspirin 81 MG EC tablet Take 81 mg by mouth Daily.   2017   • conjugated estrogens (PREMARIN) 0.625 MG/GM vaginal cream Insert 0.25 applicators into the vagina 1 (One) " Time Per Week.      • cyanocobalamin 1000 MCG/ML injection Inject 1,000 mcg into the shoulder, thigh, or buttocks Every 28 (Twenty-Eight) Days.      • donepezil (ARICEPT) 10 MG tablet Take 1 tablet by mouth Every Night. 90 tablet 3 11/7/2017   • FLUoxetine (PROzac) 20 MG capsule Take 20 mg by mouth Daily.   11/7/2017   • fluticasone (FLONASE) 50 MCG/ACT nasal spray 2 sprays into each nostril Daily.   11/7/2017   • gabapentin (NEURONTIN) 600 MG tablet Take 1 tablet by mouth 2 (Two) Times a Day. 180 tablet 3 11/7/2017   • lansoprazole (PREVACID) 30 MG capsule Take 30 mg by mouth Daily.   11/7/2017   • levETIRAcetam (KEPPRA) 500 MG tablet Take 0.5 tablets by mouth 2 (Two) Times a Day. 90 tablet 3 11/7/2017   • levothyroxine (SYNTHROID, LEVOTHROID) 125 MCG tablet Take 125 mcg by mouth Daily.   11/7/2017   • magnesium oxide (MAG-OX) 400 MG tablet Take 400 mg by mouth Daily.   11/7/2017   • meloxicam (MOBIC) 15 MG tablet Take 15 mg by mouth Daily.   11/7/2017   • memantine (NAMENDA) 5 MG tablet Take 1 tablet by mouth 2 (Two) Times a Day. 180 tablet 3 11/7/2017   • Mirabegron ER (MYRBETRIQ) 50 MG tablet sustained-release 24 hour 24 hr tablet Take 50 mg by mouth Daily.   11/7/2017   • Multiple Vitamins-Minerals (MULTIVITAMIN & MINERAL PO) Take 1 tablet by mouth Daily.   11/7/2017   • zonisamide (ZONEGRAN) 100 MG capsule Take 300 mg by mouth Every Night.   11/7/2017     Allergies:  Amlodipine besylate; Penicillins; Lisinopril; and Metoclopramide    Review of Systems   Constitutional: Negative for chills, fatigue and fever.   HENT: Negative for congestion and nosebleeds.    Eyes: Negative for redness and visual disturbance.   Respiratory: Negative for cough, choking and shortness of breath.    Cardiovascular: Negative for chest pain, palpitations and leg swelling.   Gastrointestinal: Negative for abdominal pain, blood in stool, constipation, diarrhea, nausea and vomiting.   Endocrine: Negative for cold intolerance and heat  intolerance.   Genitourinary: Negative for difficulty urinating, dysuria and hematuria.   Musculoskeletal: Negative for arthralgias and myalgias.   Skin: Negative for pallor and rash.   Neurological: Positive for dizziness, speech difficulty, weakness and headaches. Negative for syncope, light-headedness and numbness.   Hematological: Negative for adenopathy. Does not bruise/bleed easily.   Psychiatric/Behavioral: Positive for confusion. Negative for decreased concentration.        Objective    Vital Signs  Temp:  [98.1 °F (36.7 °C)-98.7 °F (37.1 °C)] 98.1 °F (36.7 °C)  Heart Rate:  [54-63] 63  Resp:  [18] 18  BP: (152-178)/() 152/86  SpO2:  [96 %-98 %] 98 %  on   ;   O2 Device: room air  Body mass index is 36.87 kg/(m^2).    Physical Exam   Constitutional: She is oriented to person, place, and time. No distress.   HENT:   Head: Normocephalic and atraumatic.   Mouth/Throat: Oropharynx is clear and moist.   Eyes: Conjunctivae and EOM are normal. Pupils are equal, round, and reactive to light. No scleral icterus.   Neck: Normal range of motion. Neck supple. No JVD present.   Cardiovascular: Normal rate, regular rhythm and intact distal pulses.    Pulmonary/Chest: Effort normal and breath sounds normal.   Abdominal: Soft. Bowel sounds are normal.   Musculoskeletal: She exhibits no edema or tenderness.   Neurological: She is alert and oriented to person, place, and time. She has normal strength. No cranial nerve deficit or sensory deficit. Coordination normal.   Skin: Skin is warm and dry. No rash noted. She is not diaphoretic.   Psychiatric: She has a normal mood and affect. Her behavior is normal.   Nursing note and vitals reviewed.      Results Review:   I reviewed the patient's new clinical results.    Lab Results (last 24 hours)     Procedure Component Value Units Date/Time    CBC & Differential [[id number]] Collected:  11/07/17 2006    Specimen:  Blood Updated:  11/07/17 2044    Narrative:       The following  orders were created for panel order CBC & Differential.  Procedure                               Abnormality         Status                     ---------                               -----------         ------                     CBC Auto Differential[[id number]]        Abnormal            Final result                 Please view results for these tests on the individual orders.    Comprehensive Metabolic Panel [[id number]]  (Abnormal) Collected:  11/07/17 2006    Specimen:  Blood Updated:  11/07/17 2100     Glucose 137 (H) mg/dL      BUN 16 mg/dL      Creatinine 0.94 mg/dL      Sodium 146 (H) mmol/L      Potassium 3.5 mmol/L      Chloride 107 mmol/L      CO2 26.5 mmol/L      Calcium 9.1 mg/dL      Total Protein 8.2 g/dL      Albumin 4.70 g/dL      ALT (SGPT) 10 U/L      AST (SGOT) 19 U/L      Alkaline Phosphatase 143 (H) U/L      Total Bilirubin 0.2 mg/dL      eGFR Non African Amer 61 mL/min/1.73      Globulin 3.5 gm/dL      A/G Ratio 1.3 g/dL      BUN/Creatinine Ratio 17.0     Anion Gap 12.5 mmol/L     Protime-INR [[id number]]  (Normal) Collected:  11/07/17 2006    Specimen:  Blood Updated:  11/07/17 2055     Protime 13.2 Seconds      INR 1.04    CBC Auto Differential [[id number]]  (Abnormal) Collected:  11/07/17 2006    Specimen:  Blood Updated:  11/07/17 2044     WBC 6.18 10*3/mm3      RBC 5.18 10*6/mm3      Hemoglobin 14.4 g/dL      Hematocrit 45.1 %      MCV 87.1 fL      MCH 27.8 pg      MCHC 31.9 (L) g/dL      RDW 13.4 (H) %      RDW-SD 42.8 fl      MPV 11.2 fL      Platelets 202 10*3/mm3      Neutrophil % 58.6 %      Lymphocyte % 30.6 %      Monocyte % 7.3 %      Eosinophil % 3.2 %      Basophil % 0.3 %      Immature Grans % 0.0 %      Neutrophils, Absolute 3.62 10*3/mm3      Lymphocytes, Absolute 1.89 10*3/mm3      Monocytes, Absolute 0.45 10*3/mm3      Eosinophils, Absolute 0.20 10*3/mm3      Basophils, Absolute 0.02 10*3/mm3      Immature Grans, Absolute 0.00 10*3/mm3             Results from last 7  days  Lab Units 11/07/17 2006   WBC 10*3/mm3 6.18   HEMOGLOBIN g/dL 14.4   PLATELETS 10*3/mm3 202       Results from last 7 days  Lab Units 11/07/17 2006   SODIUM mmol/L 146*   POTASSIUM mmol/L 3.5   CHLORIDE mmol/L 107   CO2 mmol/L 26.5   BUN mg/dL 16   CREATININE mg/dL 0.94   GLUCOSE mg/dL 137*   ALBUMIN g/dL 4.70   BILIRUBIN mg/dL 0.2   ALK PHOS U/L 143*   AST (SGOT) U/L 19   ALT (SGPT) U/L 10   Estimated Creatinine Clearance: 83.7 mL/min (by C-G formula based on Cr of 0.94).    Results from last 7 days  Lab Units 11/07/17 2006   INR  1.04           CT Head Without Contrast   Final Result   1. Interval progression of chronic-appearing white matter changes as   discussed. No acute finding.                       This study was performed with techniques to keep radiation doses as low   as reasonably achievable (ALARA). Individualized dose reduction   techniques using automated exposure control or adjustment of mA and/or   kV according to the patient size were employed.        This report was finalized on 11/7/2017 9:19 PM by Jake Ramírez MD.          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       Plan:   Right-sided weakness/dysarthria  -does not appear to have any deficits currently  -no acute issues  on CT scan-will proceed with workup for TIA/stroke with MRI/MRA of the brain and neck along with echo as we do not have a recent study, although the most likely cause would be CADASIL  -start ASA, statin  -will ask neuro to see    Hypernatremia  -mild, probably due to dehydration  -will monitor on gentle IVF    Hyperglycemia  -check A1C  -follow BG    DVT Prophylaxis  -TEDs/SCDs per protocol    Code Status  I discussed with the patient and she is DNR.  Healthcare surrogate is her .    I discussed the patients findings and my recommendations with patient, family and nursing staff.    Jake Aleman MD  [facility]  11/7/17  11:54PM

## 2017-11-08 NOTE — THERAPY DISCHARGE NOTE
Acute Care - Physical Therapy Initial Eval/Discharge  [facility]     Patient Name: Bhavana Teixeira  : 1960  MRN: [id number]  Today's Date: 2017   Onset of Illness/Injury or Date of Surgery Date: (P) 17  Date of Referral to PT: (P) 17  Referring Physician: Aleman (P)      Admit Date: 2017    Visit Dx:    ICD-10-CM ICD-9-CM   1. 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   • Cervical radiculopathy   • Degeneration of intervertebral disc of cervical region   • Degeneration of intervertebral disc of lumbar region   • Depression   • Fracture of distal end of radius   • Static tremor   • Family history of colon cancer   • Gastroesophageal reflux disease   • Headache   • History of respiratory system disease   • History of total hysterectomy with bilateral salpingo-oophorectomy (BSO)   • Hypothyroidism   • Irritable bowel syndrome   • Adiposity   • Osteopenia   • Partial epilepsy with impairment of consciousness   • Encounter for preprocedural cardiovascular examination   • Simple renal cyst   • Lesion of vulva   • Prolapse 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   • Cerebrovascular accident   • Seizure   • Hemispheric carotid artery syndrome   • Transient ischemic attack   • Hypernatremia   • Hyperglycemia   • DNR (do not resuscitate)     Past Medical History:   Diagnosis Date   • Arthritis    • Cervical spine disease    • Degenerative joint disease (DJD) of lumbar spine    • Depression    • Migraine    • Seasonal allergies    • Seizures    • Sleep apnea    • Thyroid disease      Past Surgical History:   Procedure Laterality Date   • BACK  SURGERY     • CHOLECYSTECTOMY     • HYSTERECTOMY            PT ASSESSMENT (last 72 hours)      PT Evaluation       11/08/17 1356 11/08/17 1300    Rehab Evaluation    Document Type (P)  evaluation  - evaluation  -SA    Subjective Information (P)  no complaints;agree to therapy  - no complaints;agree to therapy  -    Patient Effort, Rehab Treatment (P)  excellent  - excellent  -    Symptoms Noted During/After Treatment (P)  none  - none  -SA    General Information    Patient Profile Review (P)  yes  -     Onset of Illness/Injury or Date of Surgery Date (P)  11/07/17  -     Referring Physician (P)  Ash  -     General Observations (P)  found supine in bed, awake and alert   -     Pertinent History Of Current Problem (P)  s/p TIA, R sided weakness and speech difficulties, all resolved at time of eval  -     Precautions/Limitations (P)  no known precautions/limitations  -     Prior Level of Function (P)  independent:;community mobility;all household mobility  -     Equipment Currently Used at Home (P)  cane, straight  -     Barriers to Rehab (P)  none identified  -     Living Environment    Lives With (P)  spouse  -     Living Arrangements (P)  house  -     Home Accessibility (P)  stairs to enter home;bed and bath on same level  -     Number of Stairs to Enter Home (P)  2  -     Stair Railings at Home (P)  none  -     Clinical Impression    Date of Referral to PT (P)  11/07/17  -     Patient/Family Goals Statement (P)  return home  -     Criteria for Skilled Therapeutic Interventions Met (P)  no;no problems identified which require skilled intervention  -     Vital Signs    Pre Systolic BP Rehab (P)  136  -     Pre Treatment Diastolic BP (P)  81  -     Pretreatment Heart Rate (beats/min) (P)  73  -     Posttreatment Heart Rate (beats/min) (P)  77  -     Pain Assessment    Pain Assessment (P)  No/denies pain  -     Vision Assessment/Intervention    Visual  Impairment (P)  WFL  -     Cognitive Assessment/Intervention    Current Cognitive/Communication Assessment (P)  functional  -MF impaired  -SA    Orientation Status (P)  oriented x 4  -MF oriented x 4  -SA    Follows Commands/Answers Questions (P)  able to follow single-step instructions;100% of the time  -MF 75% of the time;able to follow multi-step instructions;needs increased time;needs repetition  -SA    Personal Safety (P)  WNL/WFL  -MF     Personal Safety Interventions (P)  gait belt  -     ROM (Range of Motion)    General ROM (P)  no range of motion deficits identified  -     MMT (Manual Muscle Testing)    General MMT Assessment (P)  no strength deficits identified   all ext 4+/5 MMT  -     Bed Mobility, Assessment/Treatment    Bed Mob, Supine to Sit, [place] (P)  conditional independence  -     Transfer Assessment/Treatment    Transfers, Sit-Stand [place] (P)  independent  -     Transfers, Stand-Sit [place] (P)  independent  -     Gait Assessment/Treatment    Gait, [place] Level (P)  stand by assist  -     Gait, Distance (Feet) (P)  350  -     Gait, Gait Pattern Analysis (P)  swing-through gait  -     Gait, Gait Deviations (P)  misael decreased  -     Gait, Safety Issues (P)  balance decreased during turns   no rishi LOB  -     Gait, Comment (P)  VC to take larger steps, able to attend to multiple stimuli  -     Motor Skills/Interventions    Additional Documentation (P)  Balance Skills Training (Group)  -     Balance Skills Training    Sitting-Level of Assistance (P)  Independent  -     Sitting-Balance Support (P)  Feet supported  -     Sitting-Balance Activities (P)  Trunk control activities  -     Sitting # of Minutes (P)  3  -     Standing-Level of Assistance (P)  Close supervision  -     Positioning and Restraints    Pre-Treatment Position (P)  in bed  -     Post Treatment Position (P)  chair  -     In Chair (P)  reclined;call light within  reach;encouraged to call for assist  -       11/08/17 1049 11/08/17 1036    Rehab Evaluation    Evaluation Not Performed patient unavailable for evaluation   Pt PRETTY for testing. will attempt in PM  -JT patient unavailable for evaluation   off floor to cardio   -EM      11/07/17 2321 11/07/17 2319    General Information    Equipment Currently Used at Home  none  -AK    Living Environment    Lives With spouse  -AK     Living Arrangements house  -AK     Home Accessibility no concerns  -AK     Stair Railings at Home none  -AK     Type of Financial/Environmental Concern none  -AK     Transportation Available car  -AK       User Key  (r) = Recorded By, (t) = Taken By, (c) = Cosigned By    Initials Name Provider Type    EM Selene Hines, PT Physical Therapist    JT Zandra Blanco Speech and Language Pathologist    PEDRO Marquez, RN Registered Nurse    SA Anamaria Navarro, MS CCC-SLP Speech and Language Pathologist     Darrick Noonan, PT Student PT Student          Physical Therapy Education     Title: PT OT SLP Therapies (Resolved)     Topic: Physical Therapy (Resolved)     Point: Mobility training (Resolved)    Learning Progress Summary    Learner Readiness Method Response Comment Documented by Status   Patient Acceptance E,D [facility] 11/08/17 1402 Done    Acceptance E,D [facility] 11/08/17 1401 Done   Family Acceptance E,D [facility] 11/08/17 1401 Done                   Learning Progress Summary    Learner Readiness Method Response Comment Documented by Status   Patient Acceptance E,D [facility] 11/08/17 1401 Done   Family Acceptance E,D [facility] 11/08/17 1401 Done               Point: Body mechanics (Resolved)    Learning Progress Summary    Learner Readiness Method Response Comment Documented by Status   Patient Acceptance E,D [facility] 11/08/17 1402 Done    Acceptance E,D [facility] 11/08/17 1401 Done   Family Acceptance E,D [facility] 11/08/17 1401 Done               Point: Precautions (Resolved)    Learning Progress Summary     Learner Readiness Method Response Comment Documented by Status   Patient Acceptance E,D [facility]   11/08/17 1402 Done    Acceptance E,D [facility]   11/08/17 1401 Done   Family Acceptance E,D [facility]   11/08/17 1401 Done                      User Key     Initials Effective Dates Name Provider Type [facility] 09/18/17 -  Darrick Noonan, PT Student PT Student PT                PT Recommendation and Plan  Anticipated Discharge Disposition: (P) home with assist  Plan of Care Review  Plan Of Care Reviewed With: (P) patient  Outcome Summary/Follow up Plan: (P) Pt s/p TIA w/ R sided weakness and speech difficulties on admission. Pt did not present w/ any neurological sxs or functional impairments. Pt  did not require any assistance during ambulation and demonstrated no strength or balance dysfunction. Pt does not demonstrate need for skilled acute PT at this time, therefore PT is signing off on this patient. Recommended d/c to home w/ assist.               Outcome Measures       11/08/17 1405          How much help from another person do you currently need...    Turning from your back to your side while in flat bed without using bedrails? (P)  4  -MF      Moving from lying on back to sitting on the side of a flat bed without bedrails? (P)  4  -MF      Moving to and from a bed to a chair (including a wheelchair)? (P)  4  -MF      Standing up from a chair using your arms (e.g., wheelchair, bedside chair)? (P)  4  -MF      Climbing 3-5 steps with a railing? (P)  3  -MF      To walk in hospital room? (P)  4  -MF      AM-PAC 6 Clicks Score (P)  23  -MF      Functional Assessment    Outcome Measure Options (P)  AM-PAC 6 Clicks Basic Mobility (PT)  -MF        User Key  (r) = Recorded By, (t) = Taken By, (c) = Cosigned By    Initials Name Provider Type     Darrick Noonan, PT Student PT Student           Time Calculation:         PT Charges       11/08/17 1408 11/08/17 1036       Time Calculation    Start Time (P)  1340  -MF      Stop  Time (P)  1353  -      Time Calculation (min) (P)  13 min  -      PT Received On (P)  11/08/17  -      PT - Next Appointment  11/08/17  -       User Key  (r) = Recorded By, (t) = Taken By, (c) = Cosigned By    Initials Name Provider Type    EM Selene Hines, PT Physical Therapist     Darrick Noonan, PT Student PT Student          Therapy Charges for Today     Code Description Service Date Service Provider Modifiers Qty    14733565605 HC PT EVAL LOW COMPLEXITY 1 11/8/2017 Darrick Noonan, PT Student GP 1    92947969041 HC PT THER PROC EA 15 MIN 11/8/2017 Darrick Noonan, PT Student GP 1          PT G-Codes  Outcome Measure Options: (P) AM-PAC 6 Clicks Basic Mobility (PT)  Functional Limitation: (P) Mobility: Walking and moving around  Mobility: Walking and Moving Around Current Status (): (P) At least 1 percent but less than 20 percent impaired, limited or restricted  Mobility: Walking and Moving Around Goal Status (): (P) At least 1 percent but less than 20 percent impaired, limited or restricted  Mobility: Walking and Moving Around Discharge Status (): (P) At least 1 percent but less than 20 percent impaired, limited or restricted    PT Discharge Summary  Anticipated Discharge Disposition: (P) home with assist  Reason for Discharge: (P) Independent    Darrick Noonan PT Student  11/8/2017

## 2017-11-08 NOTE — NURSING NOTE
Referral received per [facility] TIA/Stroke order set.  PT and OT have evaluated and signed off as patient is at baseline.  Will sign off re: inpatient acute rehab.  Thank you--Suzan Mattson,  Rehab Adm Nurse

## 2017-11-08 NOTE — THERAPY EVALUATION
Acute Care - Speech Language Pathology   Swallow Initial Evaluation [facility]     Patient Name: Bhavana Teixeira  : 1960  MRN: [id number]  Today's Date: 2017               Admit Date: 2017    SPEECH-LANGUAGE PATHOLOGY EVALUATION - SWALLOW  Subjective: The patient was seen on this date for a Clinical Swallow evaluation.  Patient was alert and cooperative.    The patient's history is significant for CADASIL. Pt admit w/ possible CVA; MRI revealed no acute infarct. Speech is clear w/o dysarthria, pt w/ obvious word finding deficits and word problems/problem solving difficulty.   Objective: Textures given included thin liquid, nectar thick liquid, puree consistency, mechanical soft consistency and regular consistency.  Assessment: Difficulties were noted with none of the above consistencies. No overt clinical s/s aspiration or dysphagia noted.   SLP Findings:  Patient presents with functional swallow, without esophageal component.   Recommendations: Diet Textures: thin liquid, regular consistency food.  Medications should be taken whole with thin liquids.    Recommended Strategies: Upright for PO and small bites and sips. Oral care before breakfast, after all meals and PRN.  Other Recommended Evaluations: Cognitive-Linguistic Evaluation    Dysphagia therapy is not recommended. Rationale: functional oropharyngeal swallow.  Visit Dx:     ICD-10-CM ICD-9-CM   1. 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   • Cervical radiculopathy   • Degeneration of intervertebral disc of cervical region   • Degeneration of intervertebral disc of lumbar region   •  Depression   • Fracture of distal end of radius   • Static tremor   • Family history of colon cancer   • Gastroesophageal reflux disease   • Headache   • History of respiratory system disease   • History of total hysterectomy with bilateral salpingo-oophorectomy (BSO)   • Hypothyroidism   • Irritable bowel syndrome   • Adiposity   • Osteopenia   • Partial epilepsy with impairment of consciousness   • Encounter for preprocedural cardiovascular examination   • Simple renal cyst   • Lesion of vulva   • Prolapse 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   • Cerebrovascular accident   • Seizure   • Hemispheric carotid artery syndrome   • Transient ischemic attack   • Hypernatremia   • Hyperglycemia   • DNR (do not resuscitate)     Past Medical History:   Diagnosis Date   • Arthritis    • Cervical spine disease    • Degenerative joint disease (DJD) of lumbar spine    • Depression    • Migraine    • Seasonal allergies    • Seizures    • Sleep apnea    • Thyroid disease      Past Surgical History:   Procedure Laterality Date   • BACK SURGERY     • CHOLECYSTECTOMY     • HYSTERECTOMY            SWALLOW EVALUATION (last 72 hours)      Swallow Evaluation       11/08/17 1300                Rehab Evaluation    Document Type evaluation  -SA        Subjective Information no complaints;agree to therapy  -SA        Patient Effort, Rehab Treatment excellent  -SA        Symptoms Noted During/After Treatment none  -SA        General Information    Patient Profile Review yes  -SA        Subjective Patient Observations No dysarthria; but w/ mild word finding difficulties  -SA        Pertinent History Of Current Problem Admit w/ r weakness; possible CVA; MRI reavealed no acute infarct   Pt w/ CADASIL  -SA        Current Diet Limitations regular solid;thin liquids  -SA        Precautions/Limitations, Vision WFL  -SA        Precautions/Limitations, Hearing WFL  -SA        Prior Level of Function- Communication functional for ADL's with assistance   -SA        Prior Level of Function- Swallowing no diet consistency restrictions  -SA        Plans/Goals Discussed With patient  -SA        Barriers to Rehab none identified  -SA        Clinical Impression    Patient's Goals For Discharge patient did not state  -SA        SLP Swallowing Diagnosis other (see comments)   Normal OP swallow  -SA        Criteria for Skilled Therapeutic Interventions Met no problems identified which require skilled intervention  -SA        FCM, Swallowing 7-->Level 7  -SA        Therapy Frequency evaluation only;other (see comments)   would like to eval for cognition d/t problem solving deficit  -SA        SLP Diet Recommendation regular textures;thin liquids  -SA        Recommended Feeding/Eating Techniques maintain upright posture during/after eating for 30 mins;small sips/bites  -SA        SLP Rec. for Method of Medication Administration meds whole with thin liquid  -SA        Monitor For Signs Of Aspiration cough;elevated WBC count;gurgly voice;throat clearing;fever;upper respiratory infection;pneumonia;right lower lobe infiltrates  -SA        Anticipated Discharge Disposition other (see comments)   OP speech services for cognition: problem solving  -SA        Demonstrates Need for Referral to Another Service physical therapy;other (see comments)   d/t balance issues per pt  -SA        Cognitive Assessment/Intervention    Current Cognitive/Communication Assessment impaired  -SA        Orientation Status oriented x 4  -SA        Follows Commands/Answers Questions 75% of the time;able to follow multi-step instructions;needs increased time;needs repetition  -SA        Oral Motor Structure and Function    Oral Motor Anatomy and Physiology patient demonstrates anatomy and physiology that is WNL  -SA        Dentition Assessment present and adequate  -SA        Secretion Management WNL/WFL  -SA        Mucosal Quality moist, healthy  -SA        Volitional Swallow no difficulties initiating  volitional swallow  -SA        Volitional Cough no difficulties initiating volitional cough  -SA        Oral Musculature General Assessment WFL (within functional limits)  -SA          User Key  (r) = Recorded By, (t) = Taken By, (c) = Cosigned By    Initials Name Effective Dates     Anamaria Navarro MS CCC-SLP 07/25/17 -         EDUCATION  The patient has been educated in the following areas:   Dysphagia (Swallowing Impairment).    SLP Recommendation and Plan  SLP Swallowing Diagnosis: other (see comments) (Normal OP swallow)  SLP Diet Recommendation: regular textures, thin liquids  Recommended Feeding/Eating Techniques: maintain upright posture during/after eating for 30 mins, small sips/bites  SLP Rec. for Method of Medication Administration: meds whole with thin liquid  Monitor For Signs Of Aspiration: cough, elevated WBC count, gurgly voice, throat clearing, fever, upper respiratory infection, pneumonia, right lower lobe infiltrates     Criteria for Skilled Therapeutic Interventions Met: no problems identified which require skilled intervention  Anticipated Discharge Disposition: other (see comments) (OP speech services for cognition: problem solving)     Therapy Frequency: evaluation only, other (see comments) (would like to eval for cognition d/t problem solving deficit) and word finding issues        Demonstrates Need for Referral to Another Service: physical therapy, other (see comments) (d/t balance issues per pt)                SLP Outcome Measures (last 72 hours)      SLP Outcome Measures       11/08/17 1300          SLP Outcome Measures    Outcome Measure Used? Adult NOMS  -SA      FCM Scores    FCM Chosen Swallowing  -      Swallowing FCM Score 7  -        User Key  (r) = Recorded By, (t) = Taken By, (c) = Cosigned By    Initials Name Effective Dates     Anamaria Navarro MS CCC-SLP 07/25/17 -            Time Calculation:         Time Calculation- SLP       11/08/17 1343          Time Calculation- SLP     SLP Start Time 1300  -      SLP Stop Time 1345  -      SLP Time Calculation (min) 45 min  -      SLP Received On 11/08/17  -        User Key  (r) = Recorded By, (t) = Taken By, (c) = Cosigned By    Initials Name Provider Type    SA Anamaria Navarro MS CCC-SLP Speech and Language Pathologist          Therapy Charges for Today     Code Description Service Date Service Provider Modifiers Qty    44629125846 HC ST SWALLOWING CURRENT STATUS 11/8/2017 Anamaria Navarro MS CCC-SLP GN, CH 1    53411633781 HC ST SWALLOWING PROJECTED 11/8/2017 Anamaria Navarro MS CCC-SLP GN, CH 1    50940843765 HC ST SWALLOWING DISCHARGE 11/8/2017 Anamaria Navarro MS CCC-SLP GN, CH 1    25371029805 HC ST EVAL ORAL PHARYNG SWALLOW 3 11/8/2017 Anamaria Navarro MS CCC-SLP GN 1          SLP G-Codes  SLP NOMS Used?: Yes  Functional Limitations: Swallowing  Swallow Current Status (): 0 percent impaired, limited or restricted  Swallow Goal Status (): 0 percent impaired, limited or restricted  Swallow Discharge Status (): 0 percent impaired, limited or restricted    Anamaria Navarro MS CCC-SLP  11/8/2017

## 2017-11-08 NOTE — ED PROVIDER NOTES
EMERGENCY DEPARTMENT ENCOUNTER    CHIEF COMPLAINT  Chief Complaint: extremity weakness  History given by: pt  History limited by: nothing  Room Number: 22/22  PMD: Jocelyn Villagomez MD  Neurology: Dr. Boyd    HPI:  Pt is a 57 y.o. female who presents complaining of right sided weakness which began for a couple days but worsened about 1500 today. The pt also c/o dizziness, headache, right eye excessive blinking, tightening of her jaw, and confusion. The pt denies recent fever. 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 The pt has some memory deficits normally. The pt has a history of past strokes.    Duration:  A couple days  Onset: gradual  Timing: constant  Location: right side  Radiation: none  Quality: weakness  Intensity/Severity: moderate  Progression: unchanged  Associated Symptoms: dizziness, headache, right eye excessive blinking, tightening of her jaw, and confusion  Aggravating Factors: none  Alleviating Factors: none  Previous Episodes: The pt has a history of past strokes.   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 12/12/2016   • Cervical radiculopathy 07/08/2013   • Degeneration of intervertebral disc of cervical region 12/12/2016   • Degeneration of intervertebral disc of lumbar region 01/13/2015   • Depression 12/12/2016   • Fracture of distal end of radius 09/18/2015   • Static tremor 01/04/2013   • Family history of colon cancer 02/19/2014   • Gastroesophageal reflux disease 12/12/2016   • Headache 12/12/2016   • History of respiratory system disease 12/12/2016   • History of total hysterectomy with bilateral salpingo-oophorectomy (BSO) 02/19/2014   • Hypothyroidism 12/21/2012    • Irritable bowel syndrome 12/12/2016   • Adiposity 12/05/2014   • Osteopenia 02/28/2014   • Partial epilepsy with impairment of consciousness 01/04/2013   • Encounter for preprocedural cardiovascular examination 12/05/2014   • Simple renal cyst 11/26/2013   • Lesion of vulva 02/19/2014   • Prolapse 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 12/10/2014   • Cerebrovascular accident 07/08/2013   • Seizure 12/12/2016     Resolved Ambulatory Problems     Diagnosis Date Noted   • No Resolved Ambulatory Problems     Past Medical History:   Diagnosis Date   • Arthritis    • Cervical spine disease    • Degenerative joint disease (DJD) of lumbar spine    • Depression    • Migraine    • Seasonal allergies    • Seizures    • Sleep apnea    • Thyroid disease        PAST SURGICAL HISTORY  Past Surgical History:   Procedure Laterality Date   • BACK SURGERY     • CHOLECYSTECTOMY     • HYSTERECTOMY         FAMILY HISTORY  Family History   Problem Relation Age of Onset   • Migraines Mother    • Migraines Father    • Multiple sclerosis Father    • Migraines Sister        SOCIAL HISTORY  Social History     Social History   • Marital status:      Spouse name: N/A   • Number of children: N/A   • Years of education: N/A     Occupational History   • Not on file.     Social History Main Topics   • Smoking status: Never Smoker   • Smokeless tobacco: Not on file   • Alcohol use Yes      Comment: social    • Drug use: No   • Sexual activity: Not on file     Other Topics Concern   • Not on file     Social History Narrative   • No narrative on file       ALLERGIES  Amlodipine besylate; Penicillins; Lisinopril; and Metoclopramide    REVIEW OF SYSTEMS  Review of Systems   Constitutional: Negative for fever.   HENT: Negative for sore throat.         Tightening of jaw   Eyes: Negative.         Excessive blinking of the right eye   Respiratory: Negative for cough and shortness of breath.    Cardiovascular: Negative for chest pain.    Gastrointestinal: Negative for abdominal pain, diarrhea and vomiting.   Genitourinary: Negative for dysuria.   Musculoskeletal: Negative for neck pain.   Skin: Negative for rash.   Allergic/Immunologic: Negative.    Neurological: Positive for dizziness, weakness (right sided ) and headaches. Negative for numbness.   Hematological: Negative.    Psychiatric/Behavioral: Positive for confusion.   All other systems reviewed and are negative.      PHYSICAL EXAM  ED Triage Vitals   Temp Heart Rate Resp BP SpO2   11/07/17 1916 11/07/17 1916 11/07/17 1916 11/07/17 2009 11/07/17 1916   98.7 °F (37.1 °C) 60 18 169/102 97 %      Temp src Heart Rate Source Patient Position BP Location FiO2 (%)   11/07/17 1916 11/07/17 1916 11/07/17 2138 11/07/17 2138 --   Oral Monitor Lying Left arm          Physical Exam   Constitutional: She is oriented to person, place, and time. She appears distressed (mild).   HENT:   Head: Normocephalic and atraumatic.   Eyes: EOM are normal. Pupils are equal, round, and reactive to light.   Neck: Normal range of motion. Neck supple.   Cardiovascular: Normal rate, regular rhythm, normal heart sounds and intact distal pulses.    No murmur heard.  Pulmonary/Chest: Effort normal and breath sounds normal. No respiratory distress. She has no wheezes. She has no rales.   Abdominal: Soft. Bowel sounds are normal. There is no tenderness. There is no rebound and no guarding.   Musculoskeletal: Normal range of motion. She exhibits no edema.   BLE 1 + edema   Neurological: She is alert and oriented to person, place, and time. She has normal sensation and normal strength.   Skin: Skin is warm and dry. No rash noted.   Psychiatric: Mood and affect normal.   Nursing note and vitals reviewed.      LAB RESULTS  Lab Results (last 24 hours)     Procedure Component Value Units Date/Time    CBC & Differential [[id number]] Collected:  11/07/17 2006    Specimen:  Blood Updated:  11/07/17 2044    Narrative:       The following  orders were created for panel order CBC & Differential.  Procedure                               Abnormality         Status                     ---------                               -----------         ------                     CBC Auto Differential[[id number]]        Abnormal            Final result                 Please view results for these tests on the individual orders.    Comprehensive Metabolic Panel [[id number]]  (Abnormal) Collected:  11/07/17 2006    Specimen:  Blood Updated:  11/07/17 2100     Glucose 137 (H) mg/dL      BUN 16 mg/dL      Creatinine 0.94 mg/dL      Sodium 146 (H) mmol/L      Potassium 3.5 mmol/L      Chloride 107 mmol/L      CO2 26.5 mmol/L      Calcium 9.1 mg/dL      Total Protein 8.2 g/dL      Albumin 4.70 g/dL      ALT (SGPT) 10 U/L      AST (SGOT) 19 U/L      Alkaline Phosphatase 143 (H) U/L      Total Bilirubin 0.2 mg/dL      eGFR Non African Amer 61 mL/min/1.73      Globulin 3.5 gm/dL      A/G Ratio 1.3 g/dL      BUN/Creatinine Ratio 17.0     Anion Gap 12.5 mmol/L     Protime-INR [[id number]]  (Normal) Collected:  11/07/17 2006    Specimen:  Blood Updated:  11/07/17 2055     Protime 13.2 Seconds      INR 1.04    CBC Auto Differential [[id number]]  (Abnormal) Collected:  11/07/17 2006    Specimen:  Blood Updated:  11/07/17 2044     WBC 6.18 10*3/mm3      RBC 5.18 10*6/mm3      Hemoglobin 14.4 g/dL      Hematocrit 45.1 %      MCV 87.1 fL      MCH 27.8 pg      MCHC 31.9 (L) g/dL      RDW 13.4 (H) %      RDW-SD 42.8 fl      MPV 11.2 fL      Platelets 202 10*3/mm3      Neutrophil % 58.6 %      Lymphocyte % 30.6 %      Monocyte % 7.3 %      Eosinophil % 3.2 %      Basophil % 0.3 %      Immature Grans % 0.0 %      Neutrophils, Absolute 3.62 10*3/mm3      Lymphocytes, Absolute 1.89 10*3/mm3      Monocytes, Absolute 0.45 10*3/mm3      Eosinophils, Absolute 0.20 10*3/mm3      Basophils, Absolute 0.02 10*3/mm3      Immature Grans, Absolute 0.00 10*3/mm3           I ordered the above labs and  reviewed the results    RADIOLOGY  CT Head Without Contrast   Final Result   1. Interval progression of chronic-appearing white matter changes as   discussed. No acute finding.                       This study was performed with techniques to keep radiation doses as low   as reasonably achievable (ALARA). Individualized dose reduction   techniques using automated exposure control or adjustment of mA and/or   kV according to the patient size were employed.        This report was finalized on 11/7/2017 9:19 PM by Jake Ramírez MD.               I ordered the above noted radiological studies. Interpreted by radiologist.  Reviewed by me in PACS.       PROCEDURES  Procedures  Interval: baseline  1a. Level Of Consciousness: 0-->Alert: keenly responsive  1b. LOC Questions: 0-->Answers both questions correctly  1c. LOC Commands: 0-->Performs both tasks correctly  2. Best Gaze: 0-->Normal  3. Visual: 0-->No visual loss  4. Facial Palsy: 0-->Normal symmetrical movements  5a. Motor Arm, Left: 0-->No drift: limb holds 90 (or 45) degrees for full 10 secs  5b. Motor Arm, Right: 0-->No drift: limb holds 90 (or 45) degrees for full 10 secs  6a. Motor Leg, Left: 0-->No drift: leg holds 30 degree position for full 5 secs  6b. Motor Leg, Right: 0-->No drift: leg holds 30 degree position for full 5 secs  7. Limb Ataxia: 0-->Absent  8. Sensory: 0-->Normal: no sensory loss  9. Best Language: 0-->No aphasia: normal  10. Dysarthria: 0-->Normal  11. Extinction and Inattention (formerly Neglect): 0-->No abnormality    Total (NIH Stroke Scale): 0      PROGRESS AND CONSULTS  ED Course     2004: Ordered labs and CT head for further evaluation.     2129: Ordered aspirin for pain. Placed call to [facility].     2131: Rechecked pt, she was resting comfortably. Discussed imaging results which showed progression of chronic disease, but nothing acute. Discussed plan to admit pt for a possible TIA. The pt agrees with and understands plan to admit. All  questions were addressed at this time.    2208: Discussed pt's case with Dr. Aleman ([facility]) who agrees to admit pt.      MEDICAL DECISION MAKING  Results were reviewed/discussed with the patient and they were also made aware of online access. Pt also made aware that some labs, such as cultures, will not be resulted during ER visit and follow up with PMD is necessary.     Pt is not a tPA candidate as she has a stroke score of 0.     MDM  Number of Diagnoses or Management Options     Amount and/or Complexity of Data Reviewed  Clinical lab tests: reviewed and ordered  Tests in the radiology section of CPT®: reviewed and ordered (CT head showed progression of chronic-appearing white matter, but nothing acute)  Decide to obtain previous medical records or to obtain history from someone other than the patient: yes  Review and summarize past medical records: yes  Discuss the patient with other providers: yes (Dr. Brown ([facility]))    Patient Progress  Patient progress: stable         DIAGNOSIS  Final diagnoses:   Hemispheric carotid artery syndrome       DISPOSITION  ADMISSION    Discussed treatment plan and reason for admission with pt/family and admitting physician.  Pt/family voiced understanding of the plan for admission for further testing/treatment as needed.         Latest Documented Vital Signs:  As of 10:18 PM  BP- 178/93 HR- 54 Temp- 98.7 °F (37.1 °C) (Oral) O2 sat- 97%    --  Documentation assistance provided by [place] for Dr. Sanchez.  Information recorded by the scribe was done at my direction and has been verified and validated by me.          Faiza Berry  11/07/17 0472       Sacha Sanchez MD  11/07/17 1201

## 2017-11-08 NOTE — SIGNIFICANT NOTE
11/08/17 1049   Rehab Treatment   Discipline speech language pathologist   Rehab Evaluation   Evaluation Not Performed patient unavailable for evaluation  (Pt PRETTY for testing. will attempt in PM)

## 2017-11-08 NOTE — PROGRESS NOTES
"Clinical Pharmacy Services: Medication History    Bhavana Teixeira is a 57 y.o. female presenting to [facility] for   Chief Complaint   Patient presents with   • Extremity Weakness     Pt reports that she called her neurologist \"Dr. Boyd told me to come in because he thought I might be having a stroke.\"  Pt reports weakness of the right side and \"confusion of speech.\"  Per patient she does have hx of stroke and the symptoms presented three hours ago.    • Speech Problem       She  has a past medical history of Arthritis; Cervical spine disease; Degenerative joint disease (DJD) of lumbar spine; Depression; Migraine; Seasonal allergies; Seizures; Sleep apnea; and Thyroid disease.    Allergies as of 11/07/2017 - Islvio as Reviewed 11/07/2017   Allergen Reaction Noted   • Amlodipine besylate Swelling 12/12/2016   • Penicillins Hives 12/12/2016   • Lisinopril Other (See Comments) 12/12/2016   • Metoclopramide Other (See Comments) 12/12/2016       Medication information was obtained from: Spouse  Pharmacy and Phone Number: [facility] [phone]    Prior to Admission Medications     Prescriptions Last Dose Informant Patient Reported? Taking?    aspirin 81 MG EC tablet 11/7/2017 Spouse/Significant Other Yes Yes    Take 81 mg by mouth Daily.    conjugated estrogens (PREMARIN) 0.625 MG/GM vaginal cream  Spouse/Significant Other Yes Yes    Insert 0.25 applicators into the vagina 1 (One) Time Per Week.    donepezil (ARICEPT) 10 MG tablet 11/7/2017 Spouse/Significant Other No Yes    Take 1 tablet by mouth Every Night.    FLUoxetine (PROzac) 20 MG capsule 11/7/2017 Spouse/Significant Other Yes Yes    Take 20 mg by mouth Daily.    fluticasone (FLONASE) 50 MCG/ACT nasal spray 11/7/2017 Spouse/Significant Other Yes Yes    2 sprays into each nostril Daily.    gabapentin (NEURONTIN) 600 MG tablet 11/7/2017 Spouse/Significant Other No Yes    Take 1 tablet by mouth 2 (Two) Times a Day.    levETIRAcetam (KEPPRA) 500 MG tablet " 11/7/2017 Spouse/Significant Other No Yes    Take 0.5 tablets by mouth 2 (Two) Times a Day.    memantine (NAMENDA) 5 MG tablet 11/7/2017 Spouse/Significant Other No Yes    Take 1 tablet by mouth 2 (Two) Times a Day.    cyanocobalamin 1000 MCG/ML injection  Spouse/Significant Other Yes Yes    Inject 1,000 mcg into the shoulder, thigh, or buttocks Every 28 (Twenty-Eight) Days.    lansoprazole (PREVACID) 30 MG capsule 11/7/2017 Spouse/Significant Other Yes Yes    Take 30 mg by mouth Daily.    levothyroxine (SYNTHROID, LEVOTHROID) 125 MCG tablet 11/7/2017 Spouse/Significant Other Yes Yes    Take 125 mcg by mouth Daily.    magnesium oxide (MAG-OX) 400 MG tablet 11/7/2017 Spouse/Significant Other Yes Yes    Take 400 mg by mouth Daily.    meloxicam (MOBIC) 15 MG tablet 11/7/2017 Spouse/Significant Other Yes Yes    Take 15 mg by mouth Daily.    Mirabegron ER (MYRBETRIQ) 50 MG tablet sustained-release 24 hour 24 hr tablet 11/7/2017 Spouse/Significant Other Yes Yes    Take 50 mg by mouth Daily.    Multiple Vitamins-Minerals (MULTIVITAMIN & MINERAL PO) 11/7/2017 Spouse/Significant Other Yes Yes    Take 1 tablet by mouth Daily.    zonisamide (ZONEGRAN) 100 MG capsule 11/7/2017 Spouse/Significant Other Yes Yes    Take 300 mg by mouth Every Night.            Medication notes: None    This medication list is complete to the best of my knowledge as of 11/7/2017    Please call if questions.    Shasta Rush, Medication History Technician  11/7/2017 9:56 PM

## 2017-11-08 NOTE — PLAN OF CARE
Problem: Patient Care Overview (Adult)  Goal: Plan of Care Review  Outcome: Ongoing (interventions implemented as appropriate)    11/08/17 0525   Coping/Psychosocial Response Interventions   Plan Of Care Reviewed With patient   Patient Care Overview   Progress improving   Outcome Evaluation   Outcome Summary/Follow up Plan VSS, pt A&Ox4. NIH 0. All stroke like symptoms resolved. Awaiting MRI/MRA today and consult with neuro. Will continue to monitor patient closely         Problem: Stroke (Ischemic) (Adult)  Goal: Signs and Symptoms of Listed Potential Problems Will be Absent or Manageable (Stroke)  Outcome: Ongoing (interventions implemented as appropriate)    Problem: Fall Risk (Adult)  Goal: Identify Related Risk Factors and Signs and Symptoms  Outcome: Ongoing (interventions implemented as appropriate)  Goal: Absence of Falls  Outcome: Ongoing (interventions implemented as appropriate)

## 2017-11-08 NOTE — PLAN OF CARE
Problem: Patient Care Overview (Adult)  Goal: Plan of Care Review  Outcome: Ongoing (interventions implemented as appropriate)    11/08/17 1431   Coping/Psychosocial Response Interventions   Plan Of Care Reviewed With patient   Patient Care Overview   Progress improving   Outcome Evaluation   Outcome Summary/Follow up Plan Pt A&Ox4. VSS. NIH=O. Passed BSS. CT head negative for acute infarct. Echo with bubble study today. C/o mild headache. Given PRN Tylenol. WIll continue to monitor.       Goal: Discharge Needs Assessment  Outcome: Ongoing (interventions implemented as appropriate)    Problem: Stroke (Ischemic) (Adult)  Goal: Signs and Symptoms of Listed Potential Problems Will be Absent or Manageable (Stroke)  Outcome: Ongoing (interventions implemented as appropriate)    Problem: Fall Risk (Adult)  Goal: Identify Related Risk Factors and Signs and Symptoms  Outcome: Ongoing (interventions implemented as appropriate)  Goal: Absence of Falls  Outcome: Ongoing (interventions implemented as appropriate)

## 2017-11-08 NOTE — ED NOTES
"Pt reports right sided weakness x 3 days. Pt states HA, dizziness and \"fluttering of the right eye\" that started today. Pt has hx of strokes and SZ. Pt is in NAD at this time. Breathing is even and unlabored. Vital signs stable. Reassurance given, call light in reach. Stretcher in low position.       Belia Macias RN  11/07/17 2013    "

## 2017-11-08 NOTE — SIGNIFICANT NOTE
11/08/17 1541   Rehab Treatment   Discipline occupational therapist   Rehab Evaluation   Evaluation Not Performed other (see comments)  (Pt states baseline function. States performing adls on own. WYATT's WFL.  Will not follow for acute OT at this time)

## 2017-11-09 VITALS
HEART RATE: 63 BPM | RESPIRATION RATE: 18 BRPM | HEIGHT: 68 IN | BODY MASS INDEX: 36.37 KG/M2 | DIASTOLIC BLOOD PRESSURE: 84 MMHG | OXYGEN SATURATION: 98 % | WEIGHT: 240 LBS | TEMPERATURE: 98.1 F | SYSTOLIC BLOOD PRESSURE: 147 MMHG

## 2017-11-09 PROCEDURE — 92523 SPEECH SOUND LANG COMPREHEN: CPT

## 2017-11-09 PROCEDURE — 96360 HYDRATION IV INFUSION INIT: CPT

## 2017-11-09 PROCEDURE — G0378 HOSPITAL OBSERVATION PER HR: HCPCS

## 2017-11-09 PROCEDURE — 96361 HYDRATE IV INFUSION ADD-ON: CPT

## 2017-11-09 RX ORDER — LOPERAMIDE HYDROCHLORIDE 2 MG/1
4 CAPSULE ORAL ONCE
Status: COMPLETED | OUTPATIENT
Start: 2017-11-09 | End: 2017-11-09

## 2017-11-09 RX ORDER — CLOPIDOGREL BISULFATE 75 MG/1
75 TABLET ORAL DAILY
Qty: 90 TABLET | Refills: 0 | Status: SHIPPED | OUTPATIENT
Start: 2017-11-10 | End: 2018-01-09 | Stop reason: HOSPADM

## 2017-11-09 RX ORDER — LOPERAMIDE HYDROCHLORIDE 2 MG/1
2 CAPSULE ORAL 4 TIMES DAILY PRN
Refills: 0
Start: 2017-11-09 | End: 2017-11-12

## 2017-11-09 RX ORDER — ATORVASTATIN CALCIUM 20 MG/1
20 TABLET, FILM COATED ORAL NIGHTLY
Qty: 30 TABLET | Refills: 0 | Status: SHIPPED | OUTPATIENT
Start: 2017-11-09 | End: 2018-01-09 | Stop reason: HOSPADM

## 2017-11-09 RX ORDER — ACETAMINOPHEN 325 MG/1
650 TABLET ORAL EVERY 4 HOURS PRN
Start: 2017-11-09 | End: 2019-02-26

## 2017-11-09 RX ADMIN — ACETAMINOPHEN 650 MG: 325 TABLET ORAL at 13:17

## 2017-11-09 RX ADMIN — SODIUM CHLORIDE 75 ML/HR: 9 INJECTION, SOLUTION INTRAVENOUS at 04:54

## 2017-11-09 RX ADMIN — MEMANTINE HYDROCHLORIDE 5 MG: 5 TABLET, FILM COATED ORAL at 08:39

## 2017-11-09 RX ADMIN — LOPERAMIDE HYDROCHLORIDE 4 MG: 2 CAPSULE ORAL at 12:22

## 2017-11-09 RX ADMIN — FLUTICASONE PROPIONATE 2 SPRAY: 50 SPRAY, METERED NASAL at 08:41

## 2017-11-09 RX ADMIN — GABAPENTIN 600 MG: 300 CAPSULE ORAL at 08:38

## 2017-11-09 RX ADMIN — ACETAMINOPHEN 650 MG: 325 TABLET ORAL at 04:52

## 2017-11-09 RX ADMIN — OXYBUTYNIN CHLORIDE 10 MG: 10 TABLET, FILM COATED, EXTENDED RELEASE ORAL at 08:40

## 2017-11-09 RX ADMIN — Medication 400 MG: at 08:39

## 2017-11-09 RX ADMIN — LEVETIRACETAM 250 MG: 250 TABLET, FILM COATED ORAL at 08:39

## 2017-11-09 RX ADMIN — ACETAMINOPHEN 650 MG: 325 TABLET ORAL at 09:03

## 2017-11-09 RX ADMIN — FLUOXETINE HYDROCHLORIDE 20 MG: 20 CAPSULE ORAL at 08:40

## 2017-11-09 RX ADMIN — CLOPIDOGREL 75 MG: 75 TABLET, FILM COATED ORAL at 08:56

## 2017-11-09 RX ADMIN — LEVOTHYROXINE SODIUM 125 MCG: 125 TABLET ORAL at 06:19

## 2017-11-09 RX ADMIN — PANTOPRAZOLE SODIUM 40 MG: 40 TABLET, DELAYED RELEASE ORAL at 06:19

## 2017-11-09 NOTE — PROGRESS NOTES
Continued Stay Note  [facility]     Patient Name: Bhavana Teixeira  MRN: [id number]  Today's Date: 11/9/2017    Admit Date: 11/7/2017          Discharge Plan       11/09/17 1404    Case Management/Social Work Plan    Additional Comments DC orders noted and orders for outpt PT/ OT.  Spoke with pt who is in agreement with coming to [facility] for therapies.  VM to Bhavana/ outpt therapy, and orders and contact info given to pt.  Received call back from Bhavana who took pt's info and will work on getting orders from neurology.      Final Note    Final Note Pt to be DC'd home with her  with outpt PT/ ST at [facility].          Expected Discharge Date and Time     Expected Discharge Date Expected Discharge Time    Nov 9, 2017             Anamaria Hurt RN

## 2017-11-09 NOTE — THERAPY EVALUATION
Acute Care - Speech Language Pathology Initial Evaluation  [facility]     Patient Name: Bhavana Teixeira  : 1960  MRN: [id number]  Today's Date: 2017  Onset of Illness/Injury or Date of Surgery Date: 17            Admit Date: 2017     Speech-Language/Cognitive-Linguistic Evaluation  Subjective: The patient was seen on this date for a Cognitive-Linguistic Evaluation.  Patient was alert and cooperative   The patient's history is significant for TIA. Most symptoms have resolved, however, pt reported difficulty with thinking over the last several months.  Objective: The patient was assessed utilizing The William Cognitive Assessment (MOCA) Findings were as follows:  Assessment: MOCA: The Keeling Cognitive Assessment (MOCA) is a standardized, normative assessment of cognitive-linguistic skills for use with patients ages 55-85 years old. There are three versions of the test, to decrease learning effects when administered repeatedly. Components of this assessment include: visuospatial/executive (trail making, copy 3 dimensional figure, and clock drawing), Naming (pictures of animals), Memory (recall of word list), Attention (repetition of series of numbers, responding to target stimuli within auditory list, and serial subtraction), Language (repetition of sentences, semantic fluency), Abstraction, and Orientation. The following ranges may be used to grade severity: Normal= 26 or greater, 18-25 = mild cognitive impairment, 10-17= moderate cognitive impairment and less than 10= severe cognitive impairment.  total 23 Mild cognitive impairment (18-25)     Comments: Pt recognized the difficulty and has concerns. Pt agreeable to outpatient Speech Therapy as she will likely leave the hospital today or tomorrow.  Recommendations:   Cognitive-Linguistic Therapy.  Other Recommended Evaluations: Referral to Outpatient Speech Therapy for cognition. Pt in agreement. Discussed with nurse.    Speech Therapy is  recommended. Rationale to improve cognition for greater independence in home.  It is anticipated patient will need continued speech-language pathology services at the next level of care.       Visit Dx:    ICD-10-CM ICD-9-CM   1. 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   • Cervical radiculopathy   • Degeneration of intervertebral disc of cervical region   • Degeneration of intervertebral disc of lumbar region   • Depression   • Fracture of distal end of radius   • Static tremor   • Family history of colon cancer   • Gastroesophageal reflux disease   • Headache   • History of respiratory system disease   • History of total hysterectomy with bilateral salpingo-oophorectomy (BSO)   • Hypothyroidism   • Irritable bowel syndrome   • Adiposity   • Osteopenia   • Partial epilepsy with impairment of consciousness   • Encounter for preprocedural cardiovascular examination   • Simple renal cyst   • Lesion of vulva   • Prolapse 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   • Cerebrovascular accident   • Seizure   • Hemispheric carotid artery syndrome   • Transient ischemic attack   • Hypernatremia   • Hyperglycemia   • DNR (do not resuscitate)     Past Medical History:   Diagnosis Date   • Arthritis    • Cervical spine disease    • Degenerative joint disease (DJD) of lumbar spine    • Depression    • Migraine    • Seasonal allergies    • Seizures    • Sleep apnea    • Thyroid disease      Past Surgical History:   Procedure Laterality Date   • BACK SURGERY     • CHOLECYSTECTOMY     • HYSTERECTOMY            SLP EVALUATION (last 72 hours)      SLP Evaluation       11/09/17 1200                 Rehab Evaluation    Document Type evaluation  -AW        Subjective Information no complaints;agree to therapy  -AW        Patient Effort, Rehab Treatment good  -AW        Symptoms Noted During/After Treatment none  -AW        General Information    Patient Profile Review yes  -AW        Current Diet Limitations regular solid;thin liquids  -AW        Plans/Goals Discussed With patient  -AW        Barriers to Rehab none identified  -AW        Clinical Impression    SLP Diagnosis Mild cognitive impairment  -AW        Functional Level At Time Of Evaluation impaired  -AW        Patient's Goals For Discharge return to all previous roles/activities  -AW        Criteria for Skilled Therapeutic Interventions Met skilled criteria for cognitive linguistic intervention met  -AW        Therapy Frequency PRN  -AW        Predicted Duration of Therapy Intervention (days/wks) until discharge  -AW        Expected Duration of Therapy Session (min) 15-30 minutes  -AW        Anticipated Discharge Disposition other (see comments)   recommend outpatient ST  -AW        Demonstrates Need for Referral to Another Service other (see comments)   outpatient ST  -AW        Cognitive Assessment/Intervention    Current Cognitive/Communication Assessment impaired  -AW        Orientation Status oriented x 4  -AW        Follows Commands/Answers Questions 100% of the time;able to follow single-step instructions  -AW        Personal Safety WNL/WFL  -AW        Personal Safety Interventions fall prevention program maintained  -AW          User Key  (r) = Recorded By, (t) = Taken By, (c) = Cosigned By    Initials Name Effective Dates    AW Bertha Zelaya MS [facility]-SLP 04/13/15 -            EDUCATION  The patient has been educated in the following areas:   Cognitive Impairment.    SLP Recommendation and Plan  SLP Diagnosis: Mild cognitive impairment        Criteria for Skilled Therapeutic Interventions Met: skilled criteria for cognitive linguistic  intervention met  Anticipated Discharge Disposition: other (see comments) (recommend outpatient ST)  Demonstrates Need for Referral to Another Service: other (see comments) (outpatient ST)  Therapy Frequency: PRN  Predicted Duration of Therapy Intervention (days/wks): until discharge  Expected Duration of Therapy Session (min): 15-30 minutes    Plan of Care Review  Plan Of Care Reviewed With: patient  Outcome Summary/Follow up Plan: Cognitive Eval completed. Recommended outpatient ST at discharge.          IP SLP Goals       11/09/17 1245          Cognitive Linguistic- Optimal Participation in Care    Cognitive Linguistic Optimal Participation in Care- SLP, Time to Achieve by discharge  -AW      Cognitive Linguistic Optimal Participation in Care- SLP, Activity Level Patient will improve Executive functioning skills for increased safety in environment  -AW        User Key  (r) = Recorded By, (t) = Taken By, (c) = Cosigned By    Initials Name Provider Type    ANGELA Zelaya MS CCC-SLP Speech and Language Pathologist             SLP Outcome Measures (last 72 hours)      SLP Outcome Measures       11/08/17 1300          SLP Outcome Measures    Outcome Measure Used? Adult NOMS  -SA      FCM Scores    FCM Chosen Swallowing  -SA      Swallowing FCM Score 7  -SA        User Key  (r) = Recorded By, (t) = Taken By, (c) = Cosigned By    Initials Name Effective Dates     Anamaria Navarro MS CCC-SLP 07/25/17 -           Time Calculation:         Time Calculation- SLP       11/09/17 1245          Time Calculation- SLP    SLP Start Time 0900  -AW      SLP Stop Time 0945  -AW      SLP Time Calculation (min) 45 min  -      SLP Received On 11/09/17  -AW        User Key  (r) = Recorded By, (t) = Taken By, (c) = Cosigned By    Initials Name Provider Type    ANGELA Zelaya MS CCC-SLP Speech and Language Pathologist          Therapy Charges for Today     Code Description Service Date Service Provider Modifiers Qty    [id number]  ST  EVAL SPEECH AND PROD W LANG  3 11/9/2017 Bertha Zelaya, MS CCC-SLP GN 1             ADULT NOMS (last 72 hours)      Adult NOMS       11/08/17 1300                FCM Scores    FCM Chosen Swallowing  -        Swallowing FCM Score 7  -SA          User Key  (r) = Recorded By, (t) = Taken By, (c) = Cosigned By    Initials Name Effective Dates    SA Anamaria Navarro MS CCC-SLP 07/25/17 -         SLP G-Codes  SLP NOMS Used?: Yes  Functional Limitations: Swallowing  Swallow Current Status (): 0 percent impaired, limited or restricted  Swallow Goal Status (): 0 percent impaired, limited or restricted  Swallow Discharge Status (): 0 percent impaired, limited or restricted      Bertha Zelaya MS CCC-SLP  11/9/2017

## 2017-11-09 NOTE — DISCHARGE SUMMARY
"       NAME: Bhavana Teixeira ADMIT: 2017   : 1960  PCP: Jocelyn Villagomez MD    MRN: [id number] LOS: 2 days   AGE/SEX: 57 y.o. female  ROOM: [facility]/     Date of Admission:  2017  Date of Discharge:  2017    PCP: Jocelyn Villagomez MD    CHIEF COMPLAINT  Extremity Weakness (Pt reports that she called her neurologist \"Dr. Boyd told me to come in because he thought I might be having a stroke.\"  Pt reports weakness of the right side and \"confusion of speech.\"  Per patient she does have hx of stroke and the symptoms presented three hours ago. 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       SECONDARY DIAGNOSES  Past Medical History:   Diagnosis Date   • Arthritis    • Cervical spine disease    • Degenerative joint disease (DJD) of lumbar spine    • Depression    • Migraine    • Seasonal allergies    • Seizures    • Sleep apnea    • Thyroid disease        CONSULTS   Dr. Boyd- Neurology    HOSPITAL COURSE  Patient is a 57 y.o. female with a history which is significant for CADASIL and has had strokes in the past as a result. She has multiple family membors with this condition as well. She had been on ASA 81mg as well as other medications. She was seen in the office Of Dr. Boyd Neurology on the day of admission. She was having new symptoms of right sided weakness and possible slurred speech. She went to the ER and was admitted.    MRI did show significant changes " including typical confluent white matter changes with multiple small vessel infarcts and too numerous to count tiny hemosiderin collections consistent with the diagnosis of CADASIL- but no evidence of acute stroke. MRA negative for any significant stenosis. ECHO, EKG, A1C ok. LDL was 73. Seen by Neurology who felt this was an aspirin failure and changed her to plavix 75mg daily and atorvastatin. She will follow with Neurology, as well as ST and PT as an outpatient.     DIAGNOSTICS    MRI Brain With & Without Contrast [[id number]] Not Reviewed        Order Status: Completed Collected: 11/08/17 1057        Updated: 11/09/17 1022       Narrative:         MRI OF THE BRAIN WITH AND WITHOUT CONTRAST, MRA OF THE HEAD WITHOUT  CONTRAST, MRA OF THE NECK WITH AND WITHOUT CONTRAST 11/08/2017     CLINICAL HISTORY: Stroke, patient has confusion, memory loss, aphasia.  Presented yesterday with headache and right-sided weakness.     MRI OF THE BRAIN      TECHNIQUE: Axial T1, FLAIR, fat-suppressed T2, axial diffusion and  gradient echo T2 sagittal T1 and postcontrast axial fat-suppressed T1  and coronal and sagittal T1-weighted images were obtained of the entire  head.     FINDINGS: There are extensive patchy and confluent areas of T2 high  signal throughout the periventricular and subcortical white matter of  the cerebral hemisphere and tracking into the internal and external  capsules as well as involving the central pontine white matter likely  representing severe small vessel disease that is advanced for a  57-year-old patient. Furthermore there are multiple punctate discrete  areas of encephalomalacia suggesting multiple old lacunar type infarcts  involving the anterior mid and posterior right corona radiata region,  mid left corona radiata region as well as involving the anterior right  thalamus and posterior left thalamus. Furthermore on the gradient echo  T2-weighted images there are multiple rounded small 1-4 mm foci  of  signal loss consistent with multiple small foci of hemosiderin  deposition that stud the cerebellar hemispheres. There are several in  the right and left baljeet as well as in the right and left cerebral  peduncles, the thalami bilaterally and there are several scattered in  the cerebral juxtacortical white matter of the frontal and parietal  lobes. The findings are nonspecific, can be seen with hypertensive  encephalopathy. The ventricles are normal in size. There is no midline  shift. No extra-axial fluid collections are identified and no abnormal  areas of enhancement are seen in the head. The paranasal sinuses and  mastoid air cells and middle ear cavities are clear. Good flow voids are  demonstrated in the cerebral vessels and in the dural venous sinuses.          Impression:            1. No convincing acute abnormality is seen with no convincing acute  infarct identified.     2. There is extensive confluent T2 and FLAIR hyperintensity throughout  the cerebral periventricular and subcortical white matter internal and  external capsules and involving the central pontine white matter  consistent with severe small vessel disease and there are multiple  discrete punched out small areas of encephalomalacia ranging from 2 to 6  mm in size consistent with multiple old lacunar infarcts in the right  and left corona radiata region, anterior right thalamus, posterior left  thalamus. There are also multiple discrete punched out areas of  encephalomalacia involving the genu of the corpus callosum and focus in  the posterior body of the corpus callosum.     3. There are multiple tiny 1 to 5 mm rounded foci of marked signal loss  on the gradient echo T2-weighted images consistent with multifocal  punctate nodular foci of hemosiderin deposition from old  microhemorrhages studding the brain parenchyma involving the cerebellar  hemispheres, the baljeet, cerebral peduncles, studding the thalami, and  also scattered in the  juxtacortical white matter of the frontal and  parietal lobes. This can be seen with hypertensive encephalopathy. Given  the prominent white matter changes in the anterior temporal white matter  the external capsules and corpus callosum changes, one unifying  diagnosis for all the above findings in relatively young patient is  CADASIL, which is known as cerebral autosomal dominant arteriopathy with  subcortical infarcts and leukoencephalopathy. This should be correlated  clinically. I discussed the results with Dr. Anand and subsequently  called Dr. Wally Boyd with the results and he informed me that the  patient does have a known diagnosis of CADASIL.     MRA OF THE HEAD      TECHNIQUE: 3D time-of-flight images were obtained of the vertebrobasilar  system and of the [place] with maximum intensity projection  reconstructions for an MRA examination.     FINDINGS: On the MRA of the vertebrobasilar system the visualized  portions of the distal vertebral arteries are normal in appearance from  the C2 cervical level to the vertebrobasilar junction. The posterior  inferior cerebellar arteries are normal in appearance bilaterally. The  proximal basilar artery is normal in appearance. The upper cervical and  petrous segments of the internal carotid arteries are normal in  appearance bilaterally.     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  Otherwise basilar artery and basilar tip is normal in appearance. The  posterior cerebral and superior cerebellar arteries are normal in  appearance bilaterally. The cavernous and supracavernous segments of the  internal carotid arteries are normal in appearance. The anterior and  middle cerebral arteries are normal in appearance. The anterior  communicating artery origin and middle cerebral artery trifurcations are  normal in appearance.     IMPRESSION:     Normal MRA of the  head. 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        MRA OF THE NECK      TECHNIQUE: 3D time-of-flight images were obtained of the vertebrobasilar  system and of the [place] with maximum intensity projection  reconstructions for an MRA examination.     There are no prior studies for comparison.     FINDINGS: There is anatomic origin of the great vessels off the aortic  arch. The left subclavian artery origin is normal in appearance. No  stenosis is seen in the left subclavian artery from its origin to the  vertebrobasilar junction. The left common carotid origin is normal in  appearance. No stenosis is seen in the left common carotid artery and  its bifurcation into the left internal and external carotid arteries is  normal in appearance. No stenosis is seen in the left internal carotid  artery using the NASCET criteria. The brachiocephalic artery origin is  normal IN appearance. No stenosis is seen in the brachycephalic artery  and its bifurcation into the right subclavian and common carotid  arteries are normal in appearance. No stenosis is seen in the right  subclavian artery. The right vertebral artery origin is normal in  appearance and no stenosis is seen in the right vertebral artery from  its origin to the vertebrobasilar junction. The right common carotid  origin is normal in appearance. No stenosis is seen in the right common  carotid artery. Its bifurcation into the right internal and external  carotid arteries is normal in appearance. No stenosis is seen in the  right internal carotid artery using the NASCET criteria.     IMPRESSION:     Normal MRA of the neck with no stenosis seen in the great vessels of the      Hemoglobin A1c [[id number]] (Abnormal) Collected: 11/08/17 1430        Lab Status: Final result Specimen: Blood Updated: 11/08/17 1529        Hemoglobin A1C 5.70 (H) %        Narrative:         Hemoglobin  A1C Ranges:    Increased Risk for Diabetes  5.7% to 6.4%  Diabetes                     >= 6.5%  Diabetic Goal                < 7.0%       Lipid Panel [[id number]] Collected: 11/08/17 1430       Lab Status: Final result Specimen: Blood Updated: 11/08/17 1501        Total Cholesterol 152 mg/dL         Triglycerides 84 mg/dL         HDL Cholesterol 58 mg/dL         LDL Cholesterol  77 mg/dL         VLDL Cholesterol 16.8 mg/dL         LDL/HDL Ratio 1.33        TSH [[id number]] (Normal) Collected: 11/08/17 1430        Lab Status: Final result Specimen: Blood Updated: 11/08/17 1508        TSH 1.600 mIU/mL       2D ECHO 11/8/17  · Left ventricular wall thickness is consistent with mild concentric hypertrophy.  · Left ventricular systolic function is normal. Estimated EF = 62%.        PHYSICAL EXAM  Objective     Alert, nad  CN grossly intact  Soft, nt  pleasant    CONDITION ON DISCHARGE  Stable.      DISCHARGE DISPOSITION   Home or Self Care      DISCHARGE MEDICATIONS   Bhavana Teixeira   Home Medication Instructions SAWYER:[id number]    Printed on:11/09/17 1208   Medication Information                      acetaminophen (TYLENOL) 325 MG tablet  Take 2 tablets by mouth Every 4 (Four) Hours As Needed for Mild Pain  or Fever (Temperature greater than or equal to 37 C).             atorvastatin (LIPITOR) 20 MG tablet  Take 1 tablet by mouth Every Night.             clopidogrel (PLAVIX) 75 MG tablet  Take 1 tablet by mouth Daily.             conjugated estrogens (PREMARIN) 0.625 MG/GM vaginal cream  Insert 0.25 applicators into the vagina 1 (One) Time Per Week.             cyanocobalamin 1000 MCG/ML injection  Inject 1,000 mcg into the shoulder, thigh, or buttocks Every 28 (Twenty-Eight) Days.             donepezil (ARICEPT) 10 MG tablet  Take 1 tablet by mouth Every Night.             FLUoxetine (PROzac) 20 MG capsule  Take 20 mg by mouth Daily.             fluticasone (FLONASE) 50 MCG/ACT nasal spray  2 sprays into each nostril  Daily.             gabapentin (NEURONTIN) 600 MG tablet  Take 1 tablet by mouth 2 (Two) Times a Day.             lansoprazole (PREVACID) 30 MG capsule  Take 30 mg by mouth Daily.             levETIRAcetam (KEPPRA) 500 MG tablet  Take 0.5 tablets by mouth 2 (Two) Times a Day.             levothyroxine (SYNTHROID, LEVOTHROID) 125 MCG tablet  Take 125 mcg by mouth Daily.             loperamide (IMODIUM) 2 MG capsule  Take 1 capsule by mouth 4 (Four) Times a Day As Needed for Diarrhea for up to 3 days.             magnesium oxide (MAG-OX) 400 MG tablet  Take 400 mg by mouth Daily.             memantine (NAMENDA) 5 MG tablet  Take 1 tablet by mouth 2 (Two) Times a Day.             Mirabegron ER (MYRBETRIQ) 50 MG tablet sustained-release 24 hour 24 hr tablet  Take 50 mg by mouth Daily.             Multiple Vitamins-Minerals (MULTIVITAMIN & MINERAL PO)  Take 1 tablet by mouth Daily.             zonisamide (ZONEGRAN) 100 MG capsule  Take 300 mg by mouth Every Night.                Future Appointments  Date Time Provider Department Center   1/22/2018 8:30 AM KEMAL MohanK N [facility] None     Additional Instructions for the Follow-ups that You Need to Schedule     Ambulatory Referral to Physical Therapy Evaluate and treat, Neuro    As directed    Stroke, ataxia   Specialty modality needed?:   Evaluate and treat  Neuro          Ambulatory Referral to Speech Therapy    As directed    Cognitive-Linguistic Evaluation  For stroke       Discharge Follow-up with PCP    As directed    Follow Up Details:  2-4 weeks       Discharge Follow-up with Specified Provider: Neurology; 3 Months    As directed    To:  Neurology   Follow Up:  3 Months             Follow-up Information     Follow up with Jocelyn Villagomez MD .    Specialty:  Internal Medicine    Why:  2-4 weeks    Contact information:    [address] [place] [id number] [phone]            TEST  RESULTS PENDING AT DISCHARGE         Conner Anand,  MD [facility]  11/09/17  12:06 PM    It was a pleasure taking care of this patient while in the hospital.

## 2017-11-09 NOTE — CONSULTS
CC: Right-sided weakness    HPI:  Bhavana Teixeira is a  57 y.o.  white female who I saw in the office 12/12/18 with a diagnosis of CADASIL.  She had previously been evaluated and treated by Dr. Keith Brown in the [place] group.  She had MRI scans and also indicates she had blood work for CADASIL which appears to predate lab tests availability through our system.  Dr. Brown movement to [place] and she was seen by Dr. Toth whom she did not like and she was referred to me.    I was called yesterday evening with symptoms of new right-sided weakness along with her other symptoms of pseudobulbar affect and memory and confusion problems perhaps some speech slurring.  I instructed her she should be seen in the emergency department.  She came to the [facility] emergency department and a head CT scan was obtained showing nothing acute.  An MRI scan of the brain showed typical confluent white matter changes with multiple small vessel infarcts and too numerous to count tiny hemosiderin collections consistent with the diagnosis of CADASIL.  The MRA of the neck and brain arteries did not show any significant vascular stenosis.  She does indicate she had essential headache yesterday.    Today she feels much better.  Her right-sided weakness appears fairly better.  She has been ambulating and she was seen by speech therapy, occupational therapy and physical therapy.  She has ambulated some around the moser.  She states that physical and occupational therapy did not have other specific recommendations for her.    The patient is on aspirin.  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  She has a LDL of 73.  She was placed on atorvastatin.  She has prediabetes and some peripheral neuropathy.    She has strong family history of CADASIL in 4 of 5 siblings in her magda.  Her mother had this disorder.  Her mother was oldest in her magda but her father was a different father from the other 10 or 12 siblings.  The patient does not  know if any of her siblings had CADASIL.  The patient does not smoke and she denies hypertension.            Past Medical History:   Diagnosis Date   • Arthritis    • Cervical spine disease    • Degenerative joint disease (DJD) of lumbar spine    • Depression    • Migraine    • Seasonal allergies    • Seizures    • Sleep apnea    • Thyroid disease          Past Surgical History:   Procedure Laterality Date   • BACK SURGERY     • CHOLECYSTECTOMY     • HYSTERECTOMY             Current Facility-Administered Medications:   •  acetaminophen (TYLENOL) tablet 650 mg, 650 mg, Oral, Q4H PRN, 650 mg at 11/08/17 1326 **OR** acetaminophen (TYLENOL) suppository 650 mg, 650 mg, Rectal, Q4H PRN, Jake Aleman MD  •  aspirin tablet 325 mg, 325 mg, Oral, Daily, 325 mg at 11/08/17 0929 **OR** aspirin suppository 300 mg, 300 mg, Rectal, Daily, Jake Aleman MD  •  atorvastatin (LIPITOR) tablet 20 mg, 20 mg, Oral, Nightly, Conner Anand MD  •  bisacodyl (DULCOLAX) EC tablet 5 mg, 5 mg, Oral, Daily PRN, Jake Aleman MD  •  calcium carbonate (TUMS) chewable tablet 500 mg (200 mg elemental), 2 tablet, Oral, BID PRN, Jake Aleman MD  •  donepezil (ARICEPT) tablet 10 mg, 10 mg, Oral, Nightly, Jake Aleman MD  •  FLUoxetine (PROzac) capsule 20 mg, 20 mg, Oral, Daily, Jake Aleman MD, 20 mg at 11/08/17 0929  •  fluticasone (FLONASE) 50 MCG/ACT nasal spray 2 spray, 2 spray, Nasal, Daily, Jake Aleman MD, 2 spray at 11/08/17 0929  •  gabapentin (NEURONTIN) capsule 600 mg, 600 mg, Oral, Q12H, Jake Aleman MD, 600 mg at 11/08/17 0929  •  levETIRAcetam (KEPPRA) tablet 250 mg, 250 mg, Oral, BID, Jake Aleman MD, 250 mg at 11/08/17 0929  •  levothyroxine (SYNTHROID, LEVOTHROID) tablet 125 mcg, 125 mcg, Oral, Q AM, Jake Aleman MD, 125 mcg at 11/08/17 0616  •  magnesium oxide (MAG-OX) tablet 400 mg, 400 mg, Oral, Daily, Jake Aleman MD, 400 mg at 11/08/17 0929  •  memantine (NAMENDA) tablet 5  mg, 5 mg, Oral, BID, Jake Aleman MD, 5 mg at 11/08/17 0929  •  nitroglycerin (NITROSTAT) SL tablet 0.4 mg, 0.4 mg, Sublingual, Q5 Min PRN, Jake Aleman MD  •  ondansetron (ZOFRAN) tablet 4 mg, 4 mg, Oral, Q6H PRN **OR** ondansetron ODT (ZOFRAN-ODT) disintegrating tablet 4 mg, 4 mg, Oral, Q6H PRN **OR** ondansetron (ZOFRAN) injection 4 mg, 4 mg, Intravenous, Q6H PRN, Jake Aleman MD  •  oxybutynin XL (DITROPAN-XL) 24 hr tablet 10 mg, 10 mg, Oral, Daily, Jake Aleman MD, 10 mg at 11/08/17 1326  •  pantoprazole (PROTONIX) EC tablet 40 mg, 40 mg, Oral, Q AM, Jake Aleman MD, 40 mg at 11/08/17 0618  •  sodium chloride 0.9 % flush 1-10 mL, 1-10 mL, Intravenous, PRN, Jake Aleman MD  •  sodium chloride 0.9 % infusion, 75 mL/hr, Intravenous, Continuous, Jake Aleman MD, Last Rate: 75 mL/hr at 11/07/17 2345, 75 mL/hr at 11/07/17 2345  •  zonisamide (ZONEGRAN) capsule 300 mg, 300 mg, Oral, Nightly, Jake Aleman MD      Family History   Problem Relation Age of Onset   • Migraines Mother    • Migraines Father    • Multiple sclerosis Father    • Migraines Sister          Social History     Social History   • Marital status:      Spouse name: N/A   • Number of children: N/A   • Years of education: N/A     Occupational History   • Not on file.     Social History Main Topics   • Smoking status: Never Smoker   • Smokeless tobacco: Not on file   • Alcohol use Yes      Comment: social    • Drug use: No   • Sexual activity: Yes     Other Topics Concern   • Not on file     Social History Narrative         Allergies   Allergen Reactions   • Amlodipine Besylate Swelling   • Penicillins Hives   • Lisinopril Other (See Comments)     COUGH   • Metoclopramide Other (See Comments)     SLEEPY   • Oxycodone Nausea Only         Pain Scale:2/10        ROS:  Review of Systems   Constitutional: Negative for activity change, appetite change and fatigue.   HENT: Negative for rhinorrhea and trouble  "swallowing. Negative for ear pain, facial swelling and hearing loss.    Eyes: Negative for visual disturbance. Negative for pain, redness and itching.   Respiratory: Negative for cough, choking and shortness of breath.    Cardiovascular: Negative for chest pain and leg swelling.   Gastrointestinal: Negative for abdominal pain, nausea and vomiting.   Endocrine: Negative for cold intolerance and heat intolerance.   Musculoskeletal: Positive for gait problem   Skin: Negative for color change and rash.   Allergic/Immunologic: Negative for environmental allergies. Negative for food allergies.   Neurological: Positive for headache.  Right body weakness.  Some dysarthria and confusion.  Hematological: Negative for adenopathy. Does not bruise/bleed easily.   Psychiatric/Behavioral: Negative for agitation, behavioral problems      Physical Exam:  Vitals:    11/07/17 2154 11/07/17 2318 11/08/17 0707 11/08/17 1304   BP: 158/89 152/86 145/89 136/81   BP Location: Left arm Left arm Left arm Left arm   Patient Position: Lying Lying Lying Lying   Pulse: 60 63 68 61   Resp:  18 16 16   Temp:  98.1 °F (36.7 °C) 97.7 °F (36.5 °C) 98.1 °F (36.7 °C)   TempSrc:  Oral Oral Oral   SpO2: 96% 98%     Weight:  242 lb 8.1 oz (110 kg)     Height:  68\" (172.7 cm)       Body mass index is 36.87 kg/(m^2).    Physical Exam  Gen.: An obese white female no acute distress  HEENT: Normocephalic no evidence of trauma.  Discs flat.  No A-V nicking.  Throat negative.  Neck: Supple.  No thyromegaly.  No cervical bruits.  Radial pulses were strong and simultaneous.  Heart: Regular rate and rhythm without murmurs      Neurological Exam:   Mental status: Awake alert oriented and conversant.  Some short-term memory problems are present.  No affective disorder noted.  HCF: No aphasia or apraxia.  Only some minimal speech dysprosody.  Cranial nerves: 2-12 intact  Motor: Normal tone and bulk.  She has full power in all muscles tested although there is some " slight pronator drift in the left arm.  Reflexes: Symmetric +2 with downgoing toe signs  Sensory: Normal light touch and pinprick throughout the arms and legs except the right foot was reduced on pinprick.  Vibration sense was essentially normal with position sense normal in the great toes.  Cerebellar: Finger to nose rapid movements were intact.  Heel-to-shin was intact.  Gait and station wasn't tested        Results:      Lab Results   Component Value Date    GLUCOSE 120 (H) 11/08/2017    BUN 12 11/08/2017    CREATININE 0.93 11/08/2017    EGFRIFNONA 62 11/08/2017    BCR 12.9 11/08/2017    CO2 18.2 (L) 11/08/2017    CALCIUM 9.1 11/08/2017    ALBUMIN 4.70 11/07/2017    LABIL2 1.3 11/07/2017    AST 19 11/07/2017    ALT 10 11/07/2017       Lab Results   Component Value Date    WBC 6.96 11/08/2017    HGB 13.8 11/08/2017    HCT 43.6 11/08/2017    MCV 87.9 11/08/2017     11/08/2017         .No results found for: RPR      Lab Results   Component Value Date    TSH 1.600 11/08/2017         No results found for: [id number]      No results found for: FOLATE      Lab Results   Component Value Date    HGBA1C 5.70 (H) 11/08/2017     MRI brain and MRA brain and neck arteries reviewed as noted above  Transthoracic echo showing mild concentric hypertrophy and ejection fraction was 62%.  Saline tests were negative for PFO      Assessment:   1.  TIA with patient having CADASIL..  Aspirin failure.  Very little standard risk factors.  Prediabetic and LDL in the 70s.  2.  Increasing balance trouble-likely combination of her peripheral neuropathy and her multiple small vessel strokes          Plan:  1.  Start Plavix and stop aspirin.  2.  Outpatient physical therapy for ataxia.  3.  Statin  4.  I explained to the patient that should she present with an abrupt onset of a moderate deficit she would be a candidate for TPA.  5.  She can be released when okay with primary group                              .

## 2017-11-09 NOTE — PROGRESS NOTES
Discharge Planning Assessment  [facility]     Patient Name: Bhavana Teixeira  MRN: [id number]  Today's Date: 11/9/2017    Admit Date: 11/7/2017          Discharge Needs Assessment       11/09/17 0827    Living Environment    Lives With spouse    Living Arrangements house    Home Accessibility bed and bath on same level    Stair Railings at Home none    Type of Financial/Environmental Concern none    Transportation Available car;family or friend will provide    Living Environment    Able to Return to Prior Living Arrangements yes    Discharge Needs Assessment    Concerns To Be Addressed denies needs/concerns at this time    Equipment Currently Used at Home cane, straight;rollator    Discharge Planning Comments S/W pt at bedside. Facesheet info confirmed.  Pt lives in a single story house with her , is IADLs and can drive.  She has a cane and rollator walker at home for assist with ambulation.  No hx of  or SNF.              Discharge Plan       11/09/17 0829    Case Management/Social Work Plan    Plan Pt plans to return home with her .  Pt denies needs at present.                Demographic Summary       11/09/17 0826    Referral Information    Arrived From home or self-care    Referral Source admission list    Reason For Consult discharge planning    Record Reviewed medical record    Primary Care Physician Information    Name Jocelyn Villagomze MD            Functional Status       11/09/17 0826    Functional Status Current    Ambulation 0-->independent    Transferring 0-->independent    Toileting 0-->independent    Bathing 0-->independent    Dressing 0-->independent    Eating 0-->independent    Communication 0-->understands/communicates without difficulty    Swallowing (if score 2 or more for any item, consult Rehab Services) 0-->swallows foods/liquids without difficulty    Functional Status Prior    Ambulation 0-->independent    Transferring 0-->independent    Toileting 0-->independent    Bathing  0-->independent    Dressing 0-->independent    Eating 0-->independent    Communication 0-->understands/communicates without difficulty    Swallowing 0-->swallows foods/liquids without difficulty    IADL    Medications assistive person    Meal Preparation independent    Housekeeping independent    Laundry independent    Shopping independent    Oral Care independent        Anamaria Hurt RN

## 2017-11-09 NOTE — PROGRESS NOTES
Case Management Discharge Note    Final Note: Pt to be DC'd home with her  with outpt PT/ ST at [facility].    Discharge Placement     No information found

## 2017-11-09 NOTE — PLAN OF CARE
Problem: Patient Care Overview (Adult)  Goal: Plan of Care Review  Outcome: Ongoing (interventions implemented as appropriate)    11/09/17 0552   Coping/Psychosocial Response Interventions   Plan Of Care Reviewed With patient   Patient Care Overview   Progress improving   Outcome Evaluation   Outcome Summary/Follow up Plan NIH 0. No deficits noted. C/O HA, relieved with tylenol. VSS. AO x4. No falls. Will continue to monitor.

## 2017-11-09 NOTE — PLAN OF CARE
Problem: Inpatient SLP  Goal: Cognitive-linguistic-Patient will improve Cognitive-linguistic skills to allow optimal participation in care  Outcome: Ongoing (interventions implemented as appropriate)    11/09/17 1245   Cognitive Linguistic- Optimal Participation in Care   Cognitive Linguistic Optimal Participation in Care- SLP, Time to Achieve by discharge   Cognitive Linguistic Optimal Participation in Care- SLP, Activity Level Patient will improve Executive functioning skills for increased safety in environment

## 2017-11-09 NOTE — PLAN OF CARE
Problem: Patient Care Overview (Adult)  Goal: Plan of Care Review  Outcome: Ongoing (interventions implemented as appropriate)    11/09/17 1245   Coping/Psychosocial Response Interventions   Plan Of Care Reviewed With patient   Outcome Evaluation   Outcome Summary/Follow up Plan Cognitive Eval completed. Recommended outpatient ST at discharge.

## 2017-11-13 ENCOUNTER — TELEPHONE (OUTPATIENT)
Dept: NEUROLOGY | Facility: CLINIC | Age: 57
End: 2017-11-13

## 2017-11-13 NOTE — TELEPHONE ENCOUNTER
----- Message from Wally Boyd MD sent at 11/12/2017 11:47 AM EST -----  Contact: [phone]  It appears that Dr Anand has taken care of this 11/9/17    gns  ----- Message -----     From: Laya Salvador MA     Sent: 11/10/2017   5:16 PM       To: Wally Boyd MD        ----- Message -----     From: Rama Mcneal     Sent: 11/10/2017  10:42 AM       To: Laya Salvador MA    Pt was discharged November 9th and Dr Boyd and seen her while she was in the hospital. Rehab is needing an order for her outpt PT and speech therapy. They would just like it to be entered into [facility]

## 2017-11-16 ENCOUNTER — TELEPHONE (OUTPATIENT)
Dept: NEUROLOGY | Facility: CLINIC | Age: 57
End: 2017-11-16

## 2017-11-16 DIAGNOSIS — R27.0 ATAXIA: Primary | ICD-10-CM

## 2017-11-16 NOTE — TELEPHONE ENCOUNTER
----- Message from Wally Boyd MD sent at 11/16/2017  1:07 PM EST -----  Contact: [phone]  New order for OP PT placed    gns  ----- Message -----     From: Laya Salvador MA     Sent: 11/16/2017  11:47 AM       To: Wally Boyd MD        ----- Message -----     From: Rama Mcneal     Sent: 11/16/2017  11:38 AM       To: KWABENA Lock called about the pts therapy order. They do have one from Dr Anand but she told me they cannot use a order from a hospitlist.  Please Advice.

## 2017-11-20 DIAGNOSIS — G45.1 HEMISPHERIC CAROTID ARTERY SYNDROME: Primary | ICD-10-CM

## 2017-11-20 DIAGNOSIS — I67.850 CEREBRAL AUTOSOMAL DOMINANT ARTERIOPATHY WITH SUBCORTICAL INFARCTS AND LEUKOENCEPHALOPATHY: ICD-10-CM

## 2017-11-21 ENCOUNTER — HOSPITAL ENCOUNTER (OUTPATIENT)
Dept: PHYSICAL THERAPY | Facility: HOSPITAL | Age: 57
Setting detail: THERAPIES SERIES
Discharge: HOME | End: 2017-11-21
Attending: PSYCHIATRY & NEUROLOGY

## 2017-11-21 ENCOUNTER — HOSPITAL ENCOUNTER (OUTPATIENT)
Dept: SPEECH THERAPY | Facility: HOSPITAL | Age: 57
Setting detail: THERAPIES SERIES
Discharge: HOME | End: 2017-11-21

## 2017-11-21 DIAGNOSIS — G45.9 TRANSIENT CEREBRAL ISCHEMIA, UNSPECIFIED TYPE: ICD-10-CM

## 2017-11-21 DIAGNOSIS — I67.850 CADASIL (CEREBRAL AUTOSOMAL DOMINANT ARTERIOPATHY WITH SUBCORTICAL INFARCTS AND LEUKOENCEPHALOPATHY): ICD-10-CM

## 2017-11-21 DIAGNOSIS — R26.9 GAIT ABNORMALITY: Primary | ICD-10-CM

## 2017-11-21 DIAGNOSIS — R41.841 COGNITIVE COMMUNICATION DEFICIT: ICD-10-CM

## 2017-11-21 DIAGNOSIS — R26.0 ATAXIC GAIT: ICD-10-CM

## 2017-11-21 DIAGNOSIS — I67.850 CADASIL (CEREBRAL AD ARTERIOPATHY W INFARCTS AND LEUKOENCEPHALOPATHY): ICD-10-CM

## 2017-11-21 DIAGNOSIS — I69.319 COGNITIVE DEFICIT FOLLOWING CEREBROVASCULAR ACCIDENT (CVA): Primary | ICD-10-CM

## 2017-11-21 PROCEDURE — G9159 LANG COMP CURRENT STATUS: HCPCS | Performed by: SPEECH-LANGUAGE PATHOLOGIST

## 2017-11-21 PROCEDURE — G9174 SPEECH LANG CURRENT STATUS: HCPCS | Performed by: SPEECH-LANGUAGE PATHOLOGIST

## 2017-11-21 PROCEDURE — 96125 COGNITIVE TEST BY HC PRO: CPT

## 2017-11-21 PROCEDURE — 97162 PT EVAL MOD COMPLEX 30 MIN: CPT | Performed by: PHYSICAL THERAPIST

## 2017-11-21 PROCEDURE — G9168 MEMORY CURRENT STATUS: HCPCS | Performed by: SPEECH-LANGUAGE PATHOLOGIST

## 2017-11-21 PROCEDURE — G9160 LANG COMP GOAL STATUS: HCPCS | Performed by: SPEECH-LANGUAGE PATHOLOGIST

## 2017-11-21 PROCEDURE — G8979 MOBILITY GOAL STATUS: HCPCS | Performed by: PHYSICAL THERAPIST

## 2017-11-21 PROCEDURE — G8978 MOBILITY CURRENT STATUS: HCPCS | Performed by: PHYSICAL THERAPIST

## 2017-11-21 PROCEDURE — G9169 MEMORY GOAL STATUS: HCPCS | Performed by: SPEECH-LANGUAGE PATHOLOGIST

## 2017-11-21 PROCEDURE — G9175 SPEECH LANG GOAL STATUS: HCPCS | Performed by: SPEECH-LANGUAGE PATHOLOGIST

## 2017-11-21 NOTE — THERAPY EVALUATION
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   • Cervical radiculopathy   • Degeneration of intervertebral disc of cervical region   • Degeneration of intervertebral disc of lumbar region   • Depression   • Fracture of distal end of radius   • Static tremor   • Family history of colon cancer   • Gastroesophageal reflux disease   • Headache   • History of respiratory system disease   • History of total hysterectomy with bilateral salpingo-oophorectomy (BSO)   • Hypothyroidism   • Irritable bowel syndrome   • Adiposity   • Osteopenia   • Partial epilepsy with impairment of consciousness   • Encounter for preprocedural cardiovascular examination   • Simple renal cyst   • Lesion of vulva   • Prolapse 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   • Cerebrovascular accident   • Seizure   • Hemispheric carotid artery syndrome   • Transient ischemic attack   • Hypernatremia   • Hyperglycemia   • DNR (do not resuscitate)        Past Medical History:   Diagnosis Date   • Arthritis    • Cervical spine disease    • Degenerative joint disease (DJD) of lumbar spine    • Depression    • Migraine    • Seasonal allergies    • Seizures    • Sleep apnea    • Thyroid disease         Past Surgical History:   Procedure Laterality Date   • BACK SURGERY     • CHOLECYSTECTOMY     • HYSTERECTOMY           Visit Dx:     ICD-10-CM ICD-9-CM   1. Gait abnormality R26.9 781.2   2. Ataxic gait R26.0 781.2   3. Transient cerebral ischemia, unspecified type G45.9 435.9   4. 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) I63.9 434.91    I67.89 323.81     G93.49 437.8             Patient History       11/21/17 1400          History    Chief Complaint Difficulty Walking;Falls/history of falls;Other 1 (comment)   impaired balance  -JK      Date Current Problem(s) Began 11/07/17  -JK      Brief Description of Current Complaint 58 yo WF admit to [facility] 11/7/17 to 11/9/17 with new onset of R side weakness and confusion of speech. Pt was diagnosed 12 years ago with CADASIL (cerebral autosomal dominant arteriopathy with subcortical infarcts and leukoencephelopathy) which have caused CVAs in the past. MRI: negative for new CVA.  -JK      Previous treatment for THIS PROBLEM Other (comment)   inpt PT  -JK      Onset Date- PT 11/21/17  -JK      Patient/Caregiver Goals Improve mobility;Improve strength;Other (comment)   improve balance and gait  -JK      Current Tobacco Use none  -JK      Patient's Rating of General Health Good  -JK      Occupation/sports/leisure activities [place]  -JK      Patient seeing anyone else for problem(s)? No  -JK      What clinical tests have you had for this problem? MRI  -JK      Results of Clinical Tests negative for new CVA  -JK      Related/Recent Hospitalizations Yes  -JK      Date of Hospitalization 11/07/17  -JK      Fall Risk Assessment    Any falls in the past year: Yes  -JK      Number of falls reported in the last 12 months 2-3  -JK      Factors that contributed to the fall: Other (comment)   being dizzy  -JK      Does patient have a fear of falling No  -JK      Previous Functional Level Ambulation;Bed Mobility/Transfers;IADLs;Leisure Activities;Up/Down Stairs  -JK      Services    Prior Rehab/Home Health Experiences Yes  -JK      When was the prior experience with Rehab/Home Health [facility]   16 visits so far this calendar year at [facility]  -JK      Are you currently receiving Home Health services No  -JK      Do you plan to receive Home Health services in the near future No  -JK      Daily Activities    Primary Language English  -JK    "   Are you able to read Yes  -JK      Are you able to write Yes  -JK      How does patient learn best? Listening;Demonstration  -JK      Teaching needs identified Home Exercise Program;Management of Condition;Falls Prevention  -JK      Patient is concerned about/has problems with Walking;Climbing Stairs;Coordination  -JK      Does patient have problems with the following? None  -JK      Barriers to learning Communication   expressive aphasia makes communication difficult at times  -JK      Action taken for identified issues SLP started 11/21/17  -JK      Functional Status other (comment)   at risk for falls  -JK      Pt Participated in POC and Goals Yes  -JK      Safety    Are you being hurt, hit, or frightened by anyone at home or in your life? No  -JK      Are you being neglected by a caregiver No  -JK        User Key  (r) = Recorded By, (t) = Taken By, (c) = Cosigned By    Initials Name Provider Type    LESLEY Arnold, PT Physical Therapist                    PT Neuro       11/21/17 1400          Subjective Comments    Subjective Comments \"I want to walk  and have better balance\"  -JK      Precautions and Contraindications    Precautions/Limitations no known precautions/limitations  -JK      Subjective Pain    Able to rate subjective pain? yes  -JK      Pre-Treatment Pain Level 0  -JK      Post-Treatment Pain Level 0  -JK      Pain Assessment    Pain Assessment No/denies pain  -JK      Home Living    Living Arrangements house  -JK      Home Accessibility bed and bath on same level;stairs to enter home;grab bars present (bathtub)  -JK      Number of Stairs to Enter Home 2   4\" steps  -JK      Stair Railings at Home none   pt reports she holds onto door frame when entering home  -JK      Home Equipment Rollator;Quad cane;Grab bars   pt in process of installing grab bars at toilet at home  -JK      Living Environment Comment Pt's  works during the day and pt is alone and functioning independently while he " is gone. Pt reports 2-3 falls in past year. Reports she feels dizzy when going down steps, and reports feeling dizzy is most common cause of falls.   -JK      Vision-Basic Assessment    Current Vision No visual deficits  -JK      Cognition    Overall Cognitive Status WFL  -JK      Memory --   Pt reports she is slow to recall info, but functional  -JK      Orientation Level Oriented X4  -JK      Safety Judgment Good awareness of safety precautions  -JK      Deficits Fully aware of deficits  -JK      Comments Pt has expressive aphasia  -JK      Sensation    Sensation WNL? WNL  -JK      Light Touch No apparent deficits  -JK      Proprioception    Proprioception intact B LEs  -JK      Perception    Perception WNL? WNL  -JK      Inattention/Neglect Appears intact  -JK      Initiation Appears intact  -JK      Perseveration Not present  -JK      ROM (Range of Motion)    General ROM Detail B LEs WFL  -JK      MMT (Manual Muscle Testing)    General MMT Assessment Detail B hip flex grossly 4/5; B knee and ankle stabilizers grossly 4+/5  -JK      Transfers    Transfers, Sit-Stand Ciales conditional independence  -JK      Transfers, Stand-Sit Ciales conditional independence  -JK      Transfer, Safety Issues weight-shifting ability decreased  -JK      Transfer, Impairments impaired balance;strength decreased  -JK      Transfer, Comment Pt can come to stand without the use of armrests if prompted to, but performs tsf better with use of UEs to come to stand and to come to sit  -JK      Gait Assessment/Treatment    Gait, Ciales Level conditional independence  -JK      Gait, Distance (Feet) 80  -JK      Gait, Gait Deviations misael decreased;other (see comments)   wide CHANI  -JK      Gait, Impairments strength decreased;impaired balance  -JK      Gait, Comment UEs IR with palms facing backwards during gait  -JK      Stairs Assessment/Treatment    Number of Stairs 4  -JK      Stairs, Handrail Location none  -JK       "Stairs, Tyler Level supervision required  -JDILIA      Stairs, Technique Used step over step (ascending);step to step (descending)  -JK      Stairs, Safety Issues balance decreased during turns;weight-shifting ability decreased  -JK      Stairs, Impairments impaired balance;strength decreased  -MARLAK      Stairs, Comment Pt with UEs spread out to the side without use of rails. Pt amb up/down 6\" step with arms spread wide and very guarded while coming down 6\" curb and needed CGA for safety  -JK        User Key  (r) = Recorded By, (t) = Taken By, (c) = Cosigned By    Initials Name Provider Type    LESLEY Arnold PT Physical Therapist                        Therapy Education       11/21/17 1515          Therapy Education    Given Fall prevention and home safety;Mobility training  -JK      Program Reinforced  -LESLEY      How Provided Verbal  -JK      Provided to Patient  -MARLAK      Level of Understanding Verbalized;Demonstrated  -JK        User Key  (r) = Recorded By, (t) = Taken By, (c) = Cosigned By    Initials Name Provider Type    LESLEY Arnold PT Physical Therapist                PT OP Goals       11/21/17 1500       PT Short Term Goals    STG Date to Achieve 12/21/17  -JK     STG 1 Pt to ambulate up/down 6' step with SBA without LOB.  -JK     STG 2 Pt to perform balance activities in modifed Rhomberg position with CGA in order to improve dynamic standing balance.  -JK     STG 3 Pt to perform standing balance activities on dynamic surfaces with CGA .  -JK     Long Term Goals    LTG Date to Achieve 01/21/18  -JK     LTG 1 Pt to ambulate up/down 6\" step modified independent level with use of assist device as needed.  -JK     LTG 2 Pt to demonstrate improved dynamic standing balance as demonstrated by improved WHITE balance score of 45/56 or better.   -JK     LTG 3 Pt to ambulate up/down 4 steps with modified independence and no LOB.   -JK     LTG 4 Pt to perform standing balance acitivities on dynamic surfaces " "with SBA.   -JK     Time Calculation    PT Goal Re-Cert Due Date 12/21/17  -JK       User Key  (r) = Recorded By, (t) = Taken By, (c) = Cosigned By    Initials Name Provider Type    LESLEY Arnold, CHALINO Physical Therapist                PT Assessment/Plan       11/21/17 1528       PT Assessment    Functional Limitations Impaired gait;Limitations in community activities;Limitation in home management;Decreased safety during functional activities  -JK     Impairments Balance;Gait  -JK     Assessment Comments Pt presents to PT with impaired gait and balance due to CADASILwhich causes multiple small vessel strokes and peripheral neuropathy. Pt would benefit from skilled therapy to improve dynamic standing balance and gait, reducing risk of falls, and improving qualitiy of life.  -JK     Rehab Potential Good  -JK     Patient/caregiver participated in establishment of treatment plan and goals Yes  -JK     Patient would benefit from skilled therapy intervention Yes  -JK       User Key  (r) = Recorded By, (t) = Taken By, (c) = Cosigned By    Initials Name Provider Type    LESLEY Arnold PT Physical Therapist                   Exercises       11/21/17 1400          Subjective Comments    Subjective Comments \"I want to walk  and have better balance\"  -JK      Subjective Pain    Able to rate subjective pain? yes  -JK      Pre-Treatment Pain Level 0  -JK      Post-Treatment Pain Level 0  -JK        User Key  (r) = Recorded By, (t) = Taken By, (c) = Cosigned By    Initials Name Provider Type    LESLEY Arnold PT Physical Therapist                            Outcome Measures       11/21/17 1500          Fagan Balance Scale    Sitting to Standing 3  -JK      Standing Unsupported 4  -JK      Sitting with Back Unsupported but Feet Supported on Floor or on Stool 4  -JK      Standing to Sitting 3  -JK      Transfers 4  -JK      Standing Unsupported with Eyes Closed 4  -JK      Standing Unsupported with Feet Together 4  -JK      " "Reaching Forward with Outstretched Arm While Standing 3  -JK       Object From the Floor From a Standing Position 3  -JK      Turning to Look Behind Over Left and Right Shoulders While Standing 4  -JK      Turn 360 Degrees 4  -JK      Place Alternate Foot on Step or Stool While Standing Unsupported 0  -JK      Standing Unsupported with One Foot in Front 0  -JK      Standing on One Leg 0  -JK      Fagan Total Score 40  -JK      Tinetti Assessment    Tinetti Assessment yes  -JK      Sitting Balance 1  -JK      Arises 2  -JK      Attempts to Rise 2  -JK      Immediate Standing Balance (first 5 sec) 2  -JK      Standing Balance 1  -JK      Sternal Nudge (feet close together) 2  -JK      Eyes Closed (feet close together) 1  -JK      Turning 360 Degrees- Steps 1  -JK      Turning 360 Degrees- Steadiness 1  -JK      Sitting Down 1  -JK      Tinetti Balance Score 14  -JK      Gait Initiation (immediate after told \"go\") 1  -JK      Step Length- Right Swing 1  -JK      Step Length- Left Swing 1  -JK      Foot Clearance- Right Foot 1  -JK      Foot Clearance- Left Foot 1  -JK      Step Symmetry 1  -JK      Step Continuity 1  -JK      Path (excursion) 2  -JK      Trunk 2  -JK      Base of Support 0  -JK      Gait Score 11  -JK      Tinetti Total Score 25  -JK      Timed Up and Go (TUG)    TUG Test 1 9.52 seconds  -JK      Functional Assessment    Outcome Measure Options Fagan Balance;Tinetti;Timed Up and Go (TUG)  -JK        User Key  (r) = Recorded By, (t) = Taken By, (c) = Cosigned By    Initials Name Provider Type    LESELY Arnold PT Physical Therapist          Time Calculation:   Start Time: 1015  Stop Time: 1100  Time Calculation (min): 45 min     Therapy Charges for Today     Code Description Service Date Service Provider Modifiers Qty    [id number]  PT MOBILITY CURRENT 11/21/2017 Syeda Arnold, PT GP, CJ 1    [id number]  PT MOBILITY PROJECTED 11/21/2017 Syeda Arnold PT GP, CI 1    [id number]  PT " EVAL MOD COMPLEXITY 4 11/21/2017 Syeda Arnold, PT GP 1          PT G-Codes  PT Professional Judgement Used?: Yes  Outcome Measure Options: Fagan Balance, Tinetti, Timed Up and Go (TUG)  Functional Limitation: Mobility: Walking and moving around  Mobility: Walking and Moving Around Current Status (): At least 20 percent but less than 40 percent impaired, limited or restricted  Mobility: Walking and Moving Around Goal Status (): At least 1 percent but less than 20 percent impaired, limited or restricted         Syeda Arnold, PT  11/21/2017

## 2017-11-21 NOTE — THERAPY EVALUATION
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   • Cervical radiculopathy   • Degeneration of intervertebral disc of cervical region   • Degeneration of intervertebral disc of lumbar region   • Depression   • Fracture of distal end of radius   • Static tremor   • Family history of colon cancer   • Gastroesophageal reflux disease   • Headache   • History of respiratory system disease   • History of total hysterectomy with bilateral salpingo-oophorectomy (BSO)   • Hypothyroidism   • Irritable bowel syndrome   • Adiposity   • Osteopenia   • Partial epilepsy with impairment of consciousness   • Encounter for preprocedural cardiovascular examination   • Simple renal cyst   • Lesion of vulva   • Prolapse 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   • Cerebrovascular accident   • Seizure   • Hemispheric carotid artery syndrome   • Transient ischemic attack   • Hypernatremia   • Hyperglycemia   • DNR (do not resuscitate)        Past Medical History:   Diagnosis Date   • Arthritis    • Cervical spine disease    • Degenerative joint disease (DJD) of lumbar spine    • Depression    • Migraine    • Seasonal allergies    • Seizures    • Sleep apnea    • Thyroid disease         Past Surgical History:   Procedure Laterality Date   • BACK SURGERY     • CHOLECYSTECTOMY     • HYSTERECTOMY           Visit Dx:    ICD-10-CM ICD-9-CM   1. Cognitive deficit following cerebrovascular accident (CVA) I69.319 438.0   2. Cognitive communication deficit R41.841 799.52               Adult Speech Language - 17 1200     Background and History    Reason for Referral  "(P)  Mrs. Teixeira was referred to outpatinet therapy following an office visit to neurologist Dr. Boyd who sent her to the ER after experiencing right sided weakness. Mrs. Teixeira was in the hospital from 11/7-11/9. MRI showed white matter changes with multiple small vessel infaracts. Mrs. 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 Mrs. Teixeira reports difficulty \"thinking of things I want to say.\" Inpatient SLP evaluation shows mild impairment on MOCA. -NS    Stated Goals (P)  Mrs. Teixeira would like to improve in word finding ability to continue communicating effectively with family and medical professionals.   -NS    Previous Functional Status (P)  Functional in all spheres except as noted below   Patient's goal is to return to independently managing household activities. Patient reports  has to manage finances currently.   -NS    Informant for the Evaluation (P)  Self  -NS    Standardized Tests    Cognitive/Memory Tests (P)  CLQT: Cognitive Linguistic Quick Test  -NS    CLQT (The Cognitive Linguistic Quick Test)    Attention Domain Score (P)  204  -NS    Attention Severity Rating (P)  4: WNL  -NS    Memory Domain Score (P)  167  -NS    Memory Severity Rating (P)  4: WNL  -NS    Executive Function Domain Score (P)  27  -NS    Executive Function Severity Rating (P)  4: WNL  -NS    Language Domain Score (P)  29  -NS    Language Severity Rating (P)  4: WNL  -NS    Visuospatial Domain Score (P)  98  -NS    Visuospatial Severity Rating (P)  4: WNL  -NS    Clock Drawing Total Score (P)  13  -NS    Clock Drawing Severity Rating (P)  WNL  -NS    Composite Severity Rating (P)  4  -NS    Composite Severity Rating Range (P)  4.0 - 3.5: WNL  -NS    CLQT Comments (P)  Mrs. Teixeira demonstrated most difficulty in the areas of generative naming, design memory, and immediate memory " and delayed recall. Mrs. Hernández wasa ble to recall personal facts with 100% accuracy and 100% intelligilbity. On clock drawing task, Mrs. Hernández repeated a number but self corrected without any cues. Mrs. hernández was able to retell a story with details with 80% accuracy. Mrs. Hernández did well with mazes and symbol trails, demonstrating organized thought processes and functional problem solving. On design generation, Mrs. Hernández showed some difficulty following instructions, using 3 lines rather than 4 towards end of task. Mrs. Hernández was unable to accurately repeat number sequences greater than 3 and perseverated on word list repetition tasks. Delayed recall of 3 words after 5 min was 75% accurate without cues, 100% accurate with min semantic cue. Mrs. Hernández completed word deduction task with 70% accuracy without cues, 100% with min cues. On functional word math problems, Mrs. Hernández showed difficulty calculating time question, requiring increased processing time and mod cues.   -NS      User Key  (r) = Recorded By, (t) = Taken By, (c) = Cosigned By    Initials Name Provider Type    ELIA Harry Speech Therapy Student Speech Therapy Student                               OP SLP Education       11/21/17 1511    Education    Barriers to Learning (P)  No barriers identified  -NS    Education Provided (P)  Described results of evaluation;Patient expressed understanding of evaluation  -NS    Assessed (P)  Learning needs;Learning motivation  -NS    Learning Motivation (P)  Strong  -NS    Learning Method (P)  Explanation  -NS    Teaching Response (P)  Verbalized understanding  -NS      User Key  (r) = Recorded By, (t) = Taken By, (c) = Cosigned By    Initials Name Effective Dates    ELIA Harry Speech Therapy Student 08/21/17 -                 SLP OP Goals       11/21/17 1500 11/21/17 1500    Goal Type Needed    Goal Type Needed  (P)  Memory;Probelm Solving;Verbal Expression;Auditory  Comprehension  -NS    Verbal Expression Goals    Verbal Expression LTG's  (P)  Patient will be able to use verbal expressive language skills to communicate effectively in social/avocational/work setting  -NS    Patient will be able to use verbal expressive language skills to communicate effectively in social/avocational/work setting  (P)  90%:;without cues  -NS    Status: Patient will be able to use verbal expressive language skills to communicate effectively in social/avocational/work setting  (P)  New  -NS    Verbal Expression STG's  (P)  Patient will improve verbal expressive language skills by expressing complex concepts and ideas;Patient will improve verbal expressive language skills by describing attributes, function, action and/or uses of an object/item;Patient will improve verbal expressive language skills by completing divergent naming tasks;Patient will improve verbal expressive language skills by describing single/multiple similarities and differences between two target items  -NS    Patient will improve verbal expressive language skills by describing single/multiple similarities and differences between two target items  (P)  90%:;without cues  -NS    Status: Patient will improve verbal expressive language skills by describing single/multiple similarities and differences between two target items  (P)  New  -NS    Patient will improve verbal expressive language skills by completing divergent naming tasks  (P)  90%:;without cues  -NS    Status: Patient will improve verbal expressive language skills by completing divergent naming tasks  (P)  New  -NS    Patient will improve verbal expressive language skills by describing attributes, function, action and/or uses of an object/item  (P)  90%:;without cues  -NS    Status: Patient will improve verbal expressive language skills by describing attributes, function, action and/or uses of an object/item  (P)  New  -NS    Patient will improve verbal expressive language  skills by expressing complex concepts and ideas  (P)  90%:;without cues  -NS    Status: Patient will improve verbal expressive language skills by expressing complex concepts and ideas  (P)  New  -NS    Auditory Comprehension Goals    Auditory Comprehension LTG's  (P)  Patient will comprehend spoken information in all settings  -NS    Patient will comprehend spoken information in all settings  (P)  90%:;without cues  -NS    Status: Patient will comprehend spoken information in all settings  (P)  New  -NS    Auditory Comprehension STG's  (P)  Patient will improve comprehension of spoken language by following Multi-step directions;Patient will demonstrate comprehension of spoken language by answering questions about a multi paragraph length content  -NS    Patient will improve comprehension of spoken language by following Multi-step directions  (P)  90%:;without cues  -NS    Status: Patient will improve comprehension of spoken language by following Multi-step directions  (P)  New  -NS    Patient will demonstrate comprehension of spoken language by answering questions about a multi paragraph length content  (P)  90%:;without cues  -NS    Status: Patient will demonstrate comprehension of spoken language by answering questions about a multi paragraph length content  (P)  New  -NS    Memory Goals    Memory LTG's  (P)  Patient will be able to remember information needed to participate in avocational activities  -NS    Status: Patient will be able to remember information needed to participate in avocational activities  (P)  New  -NS    Memory STG's  (P)  Patient will demonstrate improved ability to recall information by immediately recalling a series of words  -NS    Patient will demonstrate improved ability to recall information by immediately recalling a series of words  (P)  90%:;unrelated;without cues  -NS    Status: Patient will demonstrate improved ability to recall information by immediately recalling a series of  words  (P)  New  -NS    Problem Solving Goals    Problem Solving LTG's  (P)  Patient will be able to execute all activities necessary to manage a home;Patient will be able to engage in avocational activities requiring high level cognitive skills  -NS    Patient will be able to execute all activities necessary to manage a home  (P)  Independently  -NS    Status: Patient will be able to execute all activities necessary to manage a home  (P)  New  -NS    Patient will be able to engage in avocational activities requiring high level cognitive skills  (P)  Independently  -NS    Status: Patient will be able to engage in avocational activities requiring high level cognitive skills  (P)  New  -NS    Problem Solving STG's  (P)  Patient will improve ability to analyze problems and determine solutions by completing functional math problems;Patient will improve ability to analyze problems and determine solutions by completing a visual representation/filling in a chart by following  written directions  -NS    Patient will improve ability to analyze problems and determine solutions by completing a visual representation/filling in a chart by following  written directions  (P)  90%:;without cues  -NS    Status: Patient will improve ability to analyze problems and determine solutions by completing a visual representation/filling in a chart by following  written directions  (P)  New  -NS    Patient will improve ability to analyze problems and determine solutions by completing functional math problems  (P)  90%:;without cues  -NS    Status: Patient will improve ability to analyze problems and determine solutions by completing functional math problems  (P)  New  -NS    Time Calculation    PT Goal Re-Cert Due Date 12/21/17  -LESLEY       User Key  (r) = Recorded By, (t) = Taken By, (c) = Cosigned By    Initials Name Provider Type    LESLEY Arnold, PT Physical Therapist    ELIA Harry, Speech Therapy Student Speech Therapy Student                 OP SLP Assessment/Plan - 11/21/17 1509     SLP Assessment    Functional Problems (P)  Speech Language- Adult/Cognition  -NS    Impact on Function: Adult Speech Language/Cognition (P)  Difficulty following directions;Restrictions in personal and social life;Trouble learning or remembering new information  -NS    Clinical Impression: Speech Language-Adult/Congnition (P)  Mild:;Cognitive Communication Impairment  -NS    Please refer to paper survey for additional self-reported information (P)  Yes  -NS    Please refer to items scanned into chart for additional diagnostic informaiton and handouts as provided by clinician (P)  Yes  -NS    Prognosis (P)  Good (comment)  -NS    Patient/caregiver participated in establishment of treatment plan and goals (P)  Yes  -NS    Patient would benefit from skilled therapy intervention (P)  Yes  -NS    SLP Plan    Frequency (P)  2x a week  -NS    Duration (P)  4 weeks  -NS    Planned CPT's? (P)  SLP INDIVIDUAL SPEECH THERAPY: 08102  -NS    Expected Duration Therapy Session (min) (P)  45-60 minutes  -NS      User Key  (r) = Recorded By, (t) = Taken By, (c) = Cosigned By    Initials Name Provider Type    ELIA Harry Speech Therapy Student Speech Therapy Student             SLP Outcome Measures (last 72 hours)      SLP Outcome Measures       11/21/17 1500          SLP Outcome Measures    Outcome Measure Used? (P)  Adult NOMS  -NS      FCM Scores    FCM Chosen (P)  Memory;Problem Solving;Spoken Language Comprehension  -NS      Spoken Language Comprehension FCM Score (P)  5  -NS      Memory FCM Score (P)  4  -NS      Problem Solving FCM Score (P)  4  -NS        User Key  (r) = Recorded By, (t) = Taken By, (c) = Cosigned By    Initials Name Effective Dates    ELIA Harry Speech Therapy Student 08/21/17 -              Time Calculation:   SLP Start Time: (P) 1100  SLP Stop Time: (P) 1155  SLP Time Calculation (min): (P) 55 min    Therapy Charges for Today      Code Description Service Date Service Provider Modifiers Qty    78019966385 HC ST STD COG PERF TEST PER HOUR 11/21/2017 Em Harry, Speech Therapy Student GN 1          SLP G-Codes  SLP NOMS Used?: (P) Yes  Functional Limitations: (P) Spoken language comprehension, Memory, Other Speech Language Pathology  Spoken Language Comprehension Current Status (): (P) At least 20 percent but less than 40 percent impaired, limited or restricted  Spoken Language Comprehension Goal Status (): (P) At least 1 percent but less than 20 percent impaired, limited or restricted  Memory Current Status (): (P) At least 40 percent but less than 60 percent impaired, limited or restricted  Memory Goal Status (): (P) At least 1 percent but less than 20 percent impaired, limited or restricted  Other Speech-Language Pathology Functional Limitation Current Status (): (P) At least 40 percent but less than 60 percent impaired, limited or restricted  Other Speech-Language Pathology Functional Limitation Goal Status (): (P) At least 1 percent but less than 20 percent impaired, limited or restricted        Em Harry, Speech Therapy Student  11/21/2017

## 2017-11-24 ENCOUNTER — APPOINTMENT (OUTPATIENT)
Dept: SPEECH THERAPY | Facility: HOSPITAL | Age: 57
End: 2017-11-24

## 2017-11-24 ENCOUNTER — APPOINTMENT (OUTPATIENT)
Dept: PHYSICAL THERAPY | Facility: HOSPITAL | Age: 57
End: 2017-11-24
Attending: PSYCHIATRY & NEUROLOGY

## 2017-11-28 ENCOUNTER — HOSPITAL ENCOUNTER (OUTPATIENT)
Dept: SPEECH THERAPY | Facility: HOSPITAL | Age: 57
Setting detail: THERAPIES SERIES
Discharge: HOME | End: 2017-11-28

## 2017-11-28 ENCOUNTER — HOSPITAL ENCOUNTER (OUTPATIENT)
Dept: PHYSICAL THERAPY | Facility: HOSPITAL | Age: 57
Setting detail: THERAPIES SERIES
Discharge: HOME | End: 2017-11-28
Attending: PSYCHIATRY & NEUROLOGY

## 2017-11-28 DIAGNOSIS — R41.841 COGNITIVE COMMUNICATION DEFICIT: ICD-10-CM

## 2017-11-28 DIAGNOSIS — I69.319 COGNITIVE DEFICIT FOLLOWING CEREBROVASCULAR ACCIDENT (CVA): Primary | ICD-10-CM

## 2017-11-28 DIAGNOSIS — I67.850 CADASIL (CEREBRAL AD ARTERIOPATHY W INFARCTS AND LEUKOENCEPHALOPATHY): ICD-10-CM

## 2017-11-28 DIAGNOSIS — R26.9 GAIT ABNORMALITY: Primary | ICD-10-CM

## 2017-11-28 DIAGNOSIS — R26.0 ATAXIC GAIT: ICD-10-CM

## 2017-11-28 PROCEDURE — 92507 TX SP LANG VOICE COMM INDIV: CPT

## 2017-11-28 PROCEDURE — 97112 NEUROMUSCULAR REEDUCATION: CPT | Performed by: PHYSICAL THERAPIST

## 2017-11-28 NOTE — THERAPY TREATMENT NOTE
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   • Cervical radiculopathy   • Degeneration of intervertebral disc of cervical region   • Degeneration of intervertebral disc of lumbar region   • Depression   • Fracture of distal end of radius   • Static tremor   • Family history of colon cancer   • Gastroesophageal reflux disease   • Headache   • History of respiratory system disease   • History of total hysterectomy with bilateral salpingo-oophorectomy (BSO)   • Hypothyroidism   • Irritable bowel syndrome   • Adiposity   • Osteopenia   • Partial epilepsy with impairment of consciousness   • Encounter for preprocedural cardiovascular examination   • Simple renal cyst   • Lesion of vulva   • 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 Cognitive deficit following cerebrovascular accident (CVA) I69.319 438.0   2. Cognitive communication deficit R41.841 799.52   3. CADASIL (cerebral AD arteriopathy w infarcts and leukoencephalopathy) I63.9 434.91    I67.89 323.81    G93.49 437.8                               SLP OP Goals       17 1600       Subjective Comments    Subjective Comments (P)  Patient pleasant and cooperative.  -NS     Subjective Pain    Able to rate subjective pain? (P)  yes  -NS     Pre-Treatment Pain Level (P)  0  -NS     Post-Treatment Pain Level (P)  0  -NS     Verbal Expression Goals    Verbal  Expression LTG's (P)  Patient will be able to use verbal expressive language skills to communicate effectively in social/avocational/work setting  -NS     Patient will be able to use verbal expressive language skills to communicate effectively in social/avocational/work setting (P)  90%:;without cues  -NS     Status: Patient will be able to use verbal expressive language skills to communicate effectively in social/avocational/work setting (P)  Progressing as expected  -NS     Patient will improve verbal expressive language skills by describing single/multiple similarities and differences between two target items (P)  90%:;without cues  -NS     Status: Patient will improve verbal expressive language skills by describing single/multiple similarities and differences between two target items (P)  Progressing as expected  -NS     Comments: Patient will improve verbal expressive language skills by describing single/multiple similarities and differences between two target items (P)  Patient stated similarties and differences between 2 objects using complete grammatical sentences with 80% accuracy without cues, 90% with min cues. Some word finding difficulty noted.  -NS     Patient will improve verbal expressive language skills by completing divergent naming tasks (P)  90%:;without cues  -NS     Status: Patient will improve verbal expressive language skills by completing divergent naming tasks (P)  Progressing as expected  -NS     Comments: Patient will improve verbal expressive language skills by completing divergent naming tasks (P)  Average of 6 items across 3 trials. On trial 2, patient needed cues to generate more than 2 items.   -NS     Auditory Comprehension Goals    Auditory Comprehension LTG's (P)  Patient will comprehend spoken information in all settings  -NS     Patient will comprehend spoken information in all settings (P)  90%:;without cues  -NS     Status: Patient will comprehend spoken information in all  settings (P)  Progressing as expected  -NS     Patient will improve comprehension of spoken language by following Multi-step directions (P)  90%:;without cues  -NS     Status: Patient will improve comprehension of spoken language by following Multi-step directions (P)  Progressing as expected  -NS     Comments: Patient will improve comprehension of spoken language by following Multi-step directions (P)  70% on written task in which clinician read 2-3 instructions aloud to patient.   -NS     Memory Goals    Memory LTG's (P)  Patient will be able to remember information needed to participate in avocational activities  -NS     Status: Patient will be able to remember information needed to participate in avocational activities (P)  Progressing as expected  -NS     Patient will demonstrate improved ability to recall information by immediately recalling a series of words (P)  90%:;unrelated;without cues  -NS     Status: Patient will demonstrate improved ability to recall information by immediately recalling a series of words (P)  Progressing as expected  -NS     Comments: Patient will demonstrate improved ability to recall information by immediately recalling a series of words (P)  Immediate recall on mental manipulation task= 50% without cues, 75% with cues. Task involved rehearsing 4 items aloud and answering question about word placement.   -NS     Problem Solving Goals    Problem Solving LTG's (P)  Patient will be able to execute all activities necessary to manage a home;Patient will be able to engage in avocational activities requiring high level cognitive skills  -NS     Patient will be able to execute all activities necessary to manage a home (P)  Independently  -NS     Status: Patient will be able to execute all activities necessary to manage a home (P)  Progressing as expected  -NS     Patient will be able to engage in avocational activities requiring high level cognitive skills (P)  Independently  -NS      Status: Patient will be able to engage in avocational activities requiring high level cognitive skills (P)  Progressing as expected  -NS     Patient will improve ability to analyze problems and determine solutions by completing a visual representation/filling in a chart by following  written directions (P)  90%:;without cues  -NS     Status: Patient will improve ability to analyze problems and determine solutions by completing a visual representation/filling in a chart by following  written directions (P)  Progressing as expected  -NS     Comments: Patient will improve ability to analyze problems and determine solutions by completing a visual representation/filling in a chart by following  written directions (P)  Patient completed deductive reasoning puzzle with 80% accuracy without cues, 90% accuracy with min cues. Schedule task completed 70% without cues, 80% with min-mod cues. Patient showed slow processing on scheduling task.  -NS       User Key  (r) = Recorded By, (t) = Taken By, (c) = Cosigned By    Initials Name Provider Type    NS Em Harry, Speech Therapy Student Speech Therapy Student                         Time Calculation:   SLP Start Time: (P) 1500  SLP Stop Time: (P) 1600  SLP Time Calculation (min): (P) 60 min    Therapy Charges for Today     Code Description Service Date Service Provider Modifiers Qty    70368327026  ST TREATMENT SPEECH 4 11/28/2017 Em Harry, Speech Therapy Student GN 1                   Em Harry Speech Therapy Student  11/28/2017

## 2017-11-28 NOTE — THERAPY TREATMENT NOTE
"    Outpatient Physical Therapy Neuro Treatment Note  [facility]     Patient Name: Bhavana Teixeira  : 1960  MRN: [id number]  Today's Date: 2017      Visit Date: 2017    Visit Dx:    ICD-10-CM ICD-9-CM   1. Gait abnormality R26.9 781.2   2. 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   • Cervical radiculopathy   • Degeneration of intervertebral disc of cervical region   • Degeneration of intervertebral disc of lumbar region   • Depression   • Fracture of distal end of radius   • Static tremor   • Family history of colon cancer   • Gastroesophageal reflux disease   • Headache   • History of respiratory system disease   • History of total hysterectomy with bilateral salpingo-oophorectomy (BSO)   • Hypothyroidism   • Irritable bowel syndrome   • Adiposity   • Osteopenia   • Partial epilepsy with impairment of consciousness   • Encounter for preprocedural cardiovascular examination   • Simple renal cyst   • Lesion of vulva   • Prolapse 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   • Cerebrovascular accident   • Seizure   • Hemispheric carotid artery syndrome   • Transient ischemic attack   • Hypernatremia   • Hyperglycemia   • DNR (do not resuscitate)                 PT Neuro       17 1520          Subjective Comments    Subjective Comments \"This is my father's cane that I put a new base on\"  -JK      Precautions and Contraindications    Precautions/Limitations no known precautions/limitations  -JK      Subjective Pain    Able to rate subjective pain? yes  -JK      Pre-Treatment Pain Level 0  -JK      Post-Treatment Pain Level 0  -JK      Pain Assessment    Pain Assessment No/denies pain  -JK      Transfers    Transfers, Sit-Stand [place] conditional independence  -JK      Transfers, Stand-Sit [place] " "conditional independence  -JK      Transfer, Safety Issues weight-shifting ability decreased  -JK      Transfer, Impairments impaired balance;strength decreased  -JK      Transfer, Comment comes to stand with use of UEs and with wide CHANI  -JK      Gait Assessment/Treatment    Gait, [place] Level conditional independence  -JK      Gait, Assistive Device straight cane  -JK      Gait, Distance (Feet) 80  -JK      Gait, Gait Deviations misael decreased   wide CHANI; UEs IR  -JK      Gait, Impairments strength decreased;impaired balance  -JK      Gait, Comment Pt came to PT with STC with quad motion base. STC was very high (when pt's arm was down at side, handle of STC was 5\" higher than wrist). Pt's cane was lowered 4 notches.  -JK      Stairs Assessment/Treatment    Stairs, Safety Issues balance decreased during turns;weight-shifting ability decreased  -JK      Stairs, Impairments impaired balance;strength decreased  -JK      Stairs, Comment Pt amb up/down 6\" curb with use of STC and CGA, without spreading arms out when descending today  -JK        User Key  (r) = Recorded By, (t) = Taken By, (c) = Cosigned By    Initials Name Provider Type    LESLEY Arnold, PT Physical Therapist                        PT Assessment/Plan       11/28/17 1537       PT Assessment    Functional Limitations Impaired gait;Limitation in home management;Limitations in community activities;Decreased safety during functional activities  -JK     Impairments Balance;Gait;Muscle strength  -JK     Assessment Comments Pt ambulated into clinic today with use of STC with quad motion base which was shortened to help pt have more normal gait pattern. Emphasis today on dynamic standing balance activities that required single leg stance (ie-stepping over objects) or narrow base of support (moving gym ball in diagonal pattern with feet next to each other). These acitvities are a challenge for pt to perform and required min to CGA to perform for " "stability. Pt was able to perform alternate LE tapping on 6\" step without looking down at feet today and descended from 6\" step using STC with quad motion base without spreading UEs out to side for extra balance.   -JK       User Key  (r) = Recorded By, (t) = Taken By, (c) = Cosigned By    Initials Name Provider Type    LESLEY Syeda SEGOVIA Clayton, PT Physical Therapist                     Exercises       11/28/17 1520          Subjective Comments    Subjective Comments \"This is my father's cane that I put a new base on\"  -JK      Subjective Pain    Able to rate subjective pain? yes  -JK      Pre-Treatment Pain Level 0  -JK      Post-Treatment Pain Level 0  -JK      Exercise 1    Exercise Name 1 Alternate tapping on 6\" step  -JK      Cueing 1 Verbal  -JK      Sets 1 2  -JK      Reps 1 20  -JK      Additional Comments CG; no UE support  -JK      Exercise 2    Exercise Name 2 Stepping over orange hurdles forward  -JK      Cueing 2 Verbal  -JK      Sets 2 4  -JK      Reps 2 5   5 orange hurdles  -JK      Additional Comments next to dheeraj bars but encouraged pt to do without UE support  -JK      Exercise 3    Exercise Name 3 side stepping over orange hurdles  -JK      Cueing 3 Verbal  -JK      Sets 3 4  -JK      Reps 3 5   5 hurdles; 4x to Right; 4x to LEft  -JK      Additional Comments next to dheeraj bars with one hand on bars for UE support  -JK      Exercise 4    Exercise Name 4 Side stepping over one orange [place]   -JK      Cueing 4 Verbal  -JK      Sets 4 1  -JK      Reps 4 15  -JK      Additional Comments No UE support; feet right next to each other after stepping over [place]  -JK      Exercise 5    Exercise Name 5 Standing in tandem stance   -JK      Cueing 5 Verbal  -JK      Sets 5 1  -JK      Reps 5 4  -JK      Time (Seconds) 5 15 seconds  -JK      Additional Comments Pt unable to have heel-toe stance; has to have feet apart (farther apart when L foot is back); needs min to CGA to get into position  -JK      Exercise 6    " Exercise Name 6 Moving gym ball in diagonal pattern with narrow CHANI (feet right next to each other)  -JK      Cueing 6 Verbal  -JK      Sets 6 1  -JK      Reps 6 10  -JK      Additional Comments Pt given CGA for safety  -JK        User Key  (r) = Recorded By, (t) = Taken By, (c) = Cosigned By    Initials Name Provider Type    LESLEY Arnold PT Physical Therapist                                  Therapy Education       11/28/17 1537          Therapy Education    Given Mobility training;Fall prevention and home safety;Posture/body mechanics;HEP  -JK      Program Reinforced  -JK      How Provided Verbal;Demonstration  -JK      Provided to Patient  -MARLAK      Level of Understanding Teach back education performed;Verbalized;Demonstrated  -JK        User Key  (r) = Recorded By, (t) = Taken By, (c) = Cosigned By    Initials Name Provider Type    LESLEY Arnold PT Physical Therapist                Time Calculation:   Start Time: 1435  Stop Time: 1520  Time Calculation (min): 45 min     Therapy Charges for Today     Code Description Service Date Service Provider Modifiers Qty    97853463038 HC PT NEUROMUSC RE EDUCATION EA 15 MIN 11/28/2017 Syeda Arnold, PT GP 3                    Syeda Arnold PT  11/28/2017

## 2017-12-01 ENCOUNTER — HOSPITAL ENCOUNTER (OUTPATIENT)
Dept: SPEECH THERAPY | Facility: HOSPITAL | Age: 57
Setting detail: THERAPIES SERIES
Discharge: HOME | End: 2017-12-01

## 2017-12-01 ENCOUNTER — HOSPITAL ENCOUNTER (OUTPATIENT)
Dept: PHYSICAL THERAPY | Facility: HOSPITAL | Age: 57
Setting detail: THERAPIES SERIES
Discharge: HOME | End: 2017-12-01
Attending: PSYCHIATRY & NEUROLOGY

## 2017-12-01 DIAGNOSIS — R26.9 GAIT ABNORMALITY: Primary | ICD-10-CM

## 2017-12-01 DIAGNOSIS — I69.319 COGNITIVE DEFICIT FOLLOWING CEREBROVASCULAR ACCIDENT (CVA): Primary | ICD-10-CM

## 2017-12-01 DIAGNOSIS — I67.850 CADASIL (CEREBRAL AUTOSOMAL DOMINANT ARTERIOPATHY WITH SUBCORTICAL INFARCTS AND LEUKOENCEPHALOPATHY): ICD-10-CM

## 2017-12-01 DIAGNOSIS — R26.0 ATAXIC GAIT: ICD-10-CM

## 2017-12-01 DIAGNOSIS — G45.9 TRANSIENT CEREBRAL ISCHEMIA, UNSPECIFIED TYPE: ICD-10-CM

## 2017-12-01 DIAGNOSIS — I67.850 CADASIL (CEREBRAL AD ARTERIOPATHY W INFARCTS AND LEUKOENCEPHALOPATHY): ICD-10-CM

## 2017-12-01 DIAGNOSIS — R41.841 COGNITIVE COMMUNICATION DEFICIT: ICD-10-CM

## 2017-12-01 PROCEDURE — 92507 TX SP LANG VOICE COMM INDIV: CPT | Performed by: SPEECH-LANGUAGE PATHOLOGIST

## 2017-12-01 PROCEDURE — 97112 NEUROMUSCULAR REEDUCATION: CPT

## 2017-12-01 NOTE — THERAPY TREATMENT NOTE
"    Outpatient Physical Therapy Neuro Treatment Note  [facility]     Patient Name: Bhavana Teixeira  : 1960  MRN: [id number]  Today's Date: 2017      Visit Date: 2017    Visit Dx:    ICD-10-CM ICD-9-CM   1. Gait abnormality R26.9 781.2   2. Ataxic gait R26.0 781.2   3. Transient cerebral ischemia, unspecified type G45.9 435.9   4. 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   • Cervical radiculopathy   • Degeneration of intervertebral disc of cervical region   • Degeneration of intervertebral disc of lumbar region   • Depression   • Fracture of distal end of radius   • Static tremor   • Family history of colon cancer   • Gastroesophageal reflux disease   • Headache   • History of respiratory system disease   • History of total hysterectomy with bilateral salpingo-oophorectomy (BSO)   • Hypothyroidism   • Irritable bowel syndrome   • Adiposity   • Osteopenia   • Partial epilepsy with impairment of consciousness   • Encounter for preprocedural cardiovascular examination   • Simple renal cyst   • Lesion of vulva   • Prolapse 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   • Cerebrovascular accident   • Seizure   • Hemispheric carotid artery syndrome   • Transient ischemic attack   • Hypernatremia   • Hyperglycemia   • DNR (do not resuscitate)                 PT Neuro       17 1330          Subjective Comments    Subjective Comments \"I used the rollator because I was late and had to walk alot.\" \"I use the cane in the house at times.\"   -LB      Precautions and Contraindications    Precautions/Limitations no known precautions/limitations  -LB      Subjective Pain    Able to rate subjective pain? " "yes  -LB      Pre-Treatment Pain Level 0  -LB      Post-Treatment Pain Level 0  -LB      Subjective Pain Comment \"Sometimes I have back pain.\"   -LB      Transfers    Transfers, Sit-Stand [place] verbal cues required   for hand placement  -LB      Transfers, Stand-Sit [place] verbal cues required   for hand placement   -LB      Transfer, Safety Issues weight-shifting ability decreased  -LB      Transfer, Impairments impaired balance;strength decreased  -LB      Transfer, Comment wide base of support ~ 9\"   -LB      Gait Assessment/Treatment    Gait, [place] Level stand by assist  -LB      Gait, Assistive Device other (see comments)   no device   -LB      Gait, Distance (Feet) 70  -LB      Gait, Gait Deviations misael decreased   wide base of support , lateral trunk flexion   -LB      Gait, Impairments strength decreased;impaired balance  -LB      Gait, Comment Pt ambulated with her rollator 80'. rollator was lowered ~ 1\" to promote pt to stay within the walker.   -LB      Stairs Assessment/Treatment    Stairs, Comment not performed due to pt late for appointment   -LB        User Key  (r) = Recorded By, (t) = Taken By, (c) = Cosigned By    Initials Name Provider Type    NGA Canales, PT Physical Therapist                        PT Assessment/Plan       12/01/17 1610       PT Assessment    Assessment Comments Pt arrived into PT clinic ~ 30 minutes late with her rollator. Pt reporting she uses it when she is in a hurry or long distances. Pt's rollator was too tall. PT lowered her walker and explained to pt to place some wt thru the walker to reduce pressure on her back.   -LB       User Key  (r) = Recorded By, (t) = Taken By, (c) = Cosigned By    Initials Name Provider Type    NGA Canales, PT Physical Therapist                     Exercises       12/01/17 1330          Subjective Comments    Subjective Comments \"I used the rollator because I was late and had to walk alot.\" \"I use the cane in " "the house at times.\"   -LB      Subjective Pain    Able to rate subjective pain? yes  -LB      Pre-Treatment Pain Level 0  -LB      Post-Treatment Pain Level 0  -LB      Subjective Pain Comment \"Sometimes I have back pain.\"   -LB        User Key  (r) = Recorded By, (t) = Taken By, (c) = Cosigned By    Initials Name Provider Type    NGA Canales PT Physical Therapist                              PT OP Goals       12/01/17 1600       Long Term Goals    LTG 5 Pt to score a 18/24 on the Dynamci Gait index to reduce risk of falls.   -LB       User Key  (r) = Recorded By, (t) = Taken By, (c) = Cosigned By    Initials Name Provider Type    NGA Canales PT Physical Therapist                Therapy Education       12/01/17 1608          Therapy Education    Education Details Educated on placing weight thru rollator to reduce pressure on her back and why walker was lowered.   -LB      Given Mobility training   To sit and to stand with proper hand placement when using her rollator or a cane.   -LB      How Provided Verbal;Demonstration  -LB      Provided to Patient  -LB      Level of Understanding Teach back education performed;Verbalized;Demonstrated  -LB        User Key  (r) = Recorded By, (t) = Taken By, (c) = Cosigned By    Initials Name Provider Type    NGA Canales PT Physical Therapist                Outcome Measures       12/01/17 1330          Dynamic Gait Index (DGI)    Gait Level Surface 2  -LB      Change in Gait Speed 2  -LB      Gait with Horizontal Head Turns 2  -LB      Gait with Vertical Head Turns 2  -LB      Gait and Pivot Turn 2  -LB      Step Over Obstacle 2  -LB      Step Around Obstacles 2  -LB      Steps 1   not performed due to time, score taken from IE   -LB      Dynamic Gait Index Score 15  -LB      Dynamic Gait Index Comments quad tipped cane  -LB      Functional Assessment    Outcome Measure Options Dynamic Gait Index  -LB        User Key  (r) = Recorded By, (t) = Taken By, (c) = " Cosigned By    Initials Name Provider Type    LB Deanna Canales, PT Physical Therapist            Time Calculation:   Start Time: 1330  Stop Time: 1345  Time Calculation (min): 15 min     Therapy Charges for Today     Code Description Service Date Service Provider Modifiers Qty    51132992478 HC PT NEUROMUSC RE EDUCATION EA 15 MIN 12/1/2017 Deanna Canales, PT GP 1          PT G-Codes  Outcome Measure Options: Dynamic Gait Index         Deanna Canales, PT  12/1/2017

## 2017-12-01 NOTE — THERAPY TREATMENT NOTE
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   • Cervical radiculopathy   • Degeneration of intervertebral disc of cervical region   • Degeneration of intervertebral disc of lumbar region   • Depression   • Fracture of distal end of radius   • Static tremor   • Family history of colon cancer   • Gastroesophageal reflux disease   • Headache   • History of respiratory system disease   • History of total hysterectomy with bilateral salpingo-oophorectomy (BSO)   • Hypothyroidism   • Irritable bowel syndrome   • Adiposity   • Osteopenia   • Partial epilepsy with impairment of consciousness   • Encounter for preprocedural cardiovascular examination   • Simple renal cyst   • Lesion of vulva   • 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 Cognitive deficit following cerebrovascular accident (CVA) I69.319 438.0   2. Cognitive communication deficit R41.841 799.52   3. CADASIL (cerebral AD arteriopathy w infarcts and leukoencephalopathy) I63.9 434.91    I67.89 323.81    G93.49 437.8                               SLP OP Goals       17 1500       Subjective Comments    Subjective Comments Mr Teixeira is cooperative and pleasant during therapy.   -KA     Subjective Pain    Able to rate subjective pain? yes  -KA     Pre-Treatment Pain Level 0  -KA     Post-Treatment Pain Level 0  -KA     Verbal Expression Goals    Patient will  be able to use verbal expressive language skills to communicate effectively in social/avocational/work setting 90%:;without cues  -KA     Status: Patient will be able to use verbal expressive language skills to communicate effectively in social/avocational/work setting Progressing as expected  -KA     Patient will improve verbal expressive language skills by completing divergent naming tasks 90%:;without cues  -KA     Status: Patient will improve verbal expressive language skills by completing divergent naming tasks Progressing as expected  -KA     Comments: Patient will improve verbal expressive language skills by completing divergent naming tasks average of 6 across three trials in abstract divergent language tasks, and 8 with min cues  -KA     Auditory Comprehension Goals    Patient will comprehend spoken information in all settings 90%:;without cues  -KA     Status: Patient will comprehend spoken information in all settings Progressing as expected  -KA     Patient will improve comprehension of spoken language by following Multi-step directions 90%:;without cues  -KA     Status: Patient will improve comprehension of spoken language by following Multi-step directions Progressing as expected  -KA     Comments: Patient will improve comprehension of spoken language by following Multi-step directions 100% with two step commands on IPAD task   -KA     Memory Goals    Status: Patient will be able to remember information needed to participate in avocational activities Progressing as expected  -KA     Patient will demonstrate improved ability to recall information by immediately recalling a series of words 90%:;unrelated;without cues  -KA     Status: Patient will demonstrate improved ability to recall information by immediately recalling a series of words Progressing as expected  -KA     Comments: Patient will demonstrate improved ability to recall information by immediately recalling a series of words delayed recall of  three unrelated words after 5 min [id number]%, immediate recall of four words in word list retention task 60% without cues and 80% with cues and repetitions  -KA     Problem Solving Goals    Patient will be able to execute all activities necessary to manage a home Independently  -KA     Status: Patient will be able to execute all activities necessary to manage a home Progressing as expected  -KA     Patient will be able to engage in avocational activities requiring high level cognitive skills Independently  -KA     Status: Patient will be able to engage in avocational activities requiring high level cognitive skills Progressing as expected  -KA     Patient will improve ability to analyze problems and determine solutions by completing a visual representation/filling in a chart by following  written directions 90%:;without cues  -KA     Status: Patient will improve ability to analyze problems and determine solutions by completing a visual representation/filling in a chart by following  written directions --   ongoing  -KA     Comments: Patient will improve ability to analyze problems and determine solutions by completing a visual representation/filling in a chart by following  written directions 65 to 70% in two different deductive reasoning puzzles with reduced attention to detail requiring mod cues at times  -KA     Patient will improve ability to analyze problems and determine solutions by completing functional math problems 90%:;without cues  -KA     Status: Patient will improve ability to analyze problems and determine solutions by completing functional math problems --   ongoing  -KA     Comments: Patient will improve ability to analyze problems and determine solutions by completing functional math problems 50% without cues and 90% with mod cues, difficulty with solving basic simple math in head without max cues.   -KA       User Key  (r) = Recorded By, (t) = Taken By, (c) = Cosigned By    Initials Name Provider  Type    KA Gregor Phan MA,CCC-SLP Speech and Language Pathologist                         Time Calculation:   SLP Start Time: 1400  SLP Stop Time: 1500  SLP Time Calculation (min): 60 min    Therapy Charges for Today     Code Description Service Date Service Provider Modifiers Qty    [id number]  ST TREATMENT SPEECH 4 12/1/2017 Gregor Phan MA,CCC-SLP GN 1                   Gregor Phan MA,BRUCE-SLP  12/1/2017

## 2017-12-05 ENCOUNTER — HOSPITAL ENCOUNTER (OUTPATIENT)
Dept: PHYSICAL THERAPY | Facility: HOSPITAL | Age: 57
Setting detail: THERAPIES SERIES
Discharge: HOME | End: 2017-12-05
Attending: PSYCHIATRY & NEUROLOGY

## 2017-12-05 ENCOUNTER — HOSPITAL ENCOUNTER (OUTPATIENT)
Dept: SPEECH THERAPY | Facility: HOSPITAL | Age: 57
Setting detail: THERAPIES SERIES
Discharge: HOME | End: 2017-12-05

## 2017-12-05 DIAGNOSIS — I67.850 CADASIL (CEREBRAL AD ARTERIOPATHY W INFARCTS AND LEUKOENCEPHALOPATHY): ICD-10-CM

## 2017-12-05 DIAGNOSIS — G45.9 TRANSIENT CEREBRAL ISCHEMIA, UNSPECIFIED TYPE: ICD-10-CM

## 2017-12-05 DIAGNOSIS — I69.319 COGNITIVE DEFICIT FOLLOWING CEREBROVASCULAR ACCIDENT (CVA): Primary | ICD-10-CM

## 2017-12-05 DIAGNOSIS — R26.9 GAIT ABNORMALITY: Primary | ICD-10-CM

## 2017-12-05 DIAGNOSIS — I67.850 CADASIL (CEREBRAL AUTOSOMAL DOMINANT ARTERIOPATHY WITH SUBCORTICAL INFARCTS AND LEUKOENCEPHALOPATHY): ICD-10-CM

## 2017-12-05 DIAGNOSIS — R26.0 ATAXIC GAIT: ICD-10-CM

## 2017-12-05 DIAGNOSIS — R41.841 COGNITIVE COMMUNICATION DEFICIT: ICD-10-CM

## 2017-12-05 PROCEDURE — 92507 TX SP LANG VOICE COMM INDIV: CPT

## 2017-12-05 PROCEDURE — 97112 NEUROMUSCULAR REEDUCATION: CPT

## 2017-12-05 NOTE — THERAPY EVALUATION
"    Outpatient Physical Therapy Neuro Treatment Note  [facility]     Patient Name: Bhavana Teixeira  : 1960  MRN: [id number]  Today's Date: 2017      Visit Date: 2017    Visit Dx:    ICD-10-CM ICD-9-CM   1. Gait abnormality R26.9 781.2   2. Ataxic gait R26.0 781.2   3. Transient cerebral ischemia, unspecified type G45.9 435.9   4. 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   • Cervical radiculopathy   • Degeneration of intervertebral disc of cervical region   • Degeneration of intervertebral disc of lumbar region   • Depression   • Fracture of distal end of radius   • Static tremor   • Family history of colon cancer   • Gastroesophageal reflux disease   • Headache   • History of respiratory system disease   • History of total hysterectomy with bilateral salpingo-oophorectomy (BSO)   • Hypothyroidism   • Irritable bowel syndrome   • Adiposity   • Osteopenia   • Partial epilepsy with impairment of consciousness   • Encounter for preprocedural cardiovascular examination   • Simple renal cyst   • Lesion of vulva   • Prolapse 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 I didn't get hurt. I was able to get up by myself.\"   -LB      Precautions and Contraindications    Precautions/Limitations no known precautions/limitations  -LB      " Subjective Pain    Able to rate subjective pain? yes  -LB      Pre-Treatment Pain Level 0  -LB      Post-Treatment Pain Level 0  -LB      Pain Assessment    Pain Assessment No/denies pain  -LB      Transfers    Transfers, Sit-Stand [place] verbal cues required  -LB      Transfers, Stand-Sit [place] conditional independence  -LB      Transfer, Impairments impaired balance;strength decreased  -LB      Transfer, Comment Pt was able to verbally review how she performed floor transfers.   -LB      Gait Assessment/Treatment    Gait, [place] Level supervision required;verbal cues required  -LB      Gait, Assistive Device --   quad tipped cane   -LB      Gait, Distance (Feet) 100   x 3   -LB      Gait, Gait Deviations misael decreased   wide base of support   -LB      Gait, Impairments strength decreased;impaired balance  -LB      Gait, Comment Pt dmeonsrtating increased stance time each LE.   -LB      Stairs Assessment/Treatment    Number of Stairs 3  -LB      Stairs, Handrail Location none  -LB      Stairs, [place] Level minimum assist (75% patient effort)   HHA on the left   -LB      Stairs, Assistive Device --   quad tipped cane in right hand   -LB      Stairs, Technique Used step to step (ascending);step to step (descending)  -LB      Stairs, Safety Issues other (see comments);sequencing ability decreased   impaired balance   -LB      Stairs, Impairments impaired balance;strength decreased  -LB      Stairs, Comment curb with quad tipped cane and CGA and verbal cues   -LB      Balance Skills Training    Standing-Level of Assistance Contact guard  -LB      Static Standing Balance Support No upper extremity supported  -LB      Standing-Balance Activities Reaching across midline   one foot forward  -LB      Gait Balance-Level of Assistance Contact guard  -LB      Gait Balance Support No upper extremity supported  -LB      Gait Balance Activities side-stepping  -LB        User Key  (r) = Recorded By,  "(t) = Taken By, (c) = Cosigned By    Initials Name Provider Type    NGA Canales, CHALINO Physical Therapist                        PT Assessment/Plan       12/05/17 1724       PT Assessment    Assessment Comments Pt arrived in PT with her quad tipped cane. Pt reporting a recent fall in the closet but was not injured. Reviewed with pt fall prevention including having objects moved lower and to ask for help. Pt demonstrating increased stance time on her LE's with a more fluid gait pattern.   -LB       User Key  (r) = Recorded By, (t) = Taken By, (c) = Cosigned By    Initials Name Provider Type    NGA Canales, PT Physical Therapist                     Exercises       12/05/17 1430          Subjective Comments    Subjective Comments \"I am not walking as bouncy.\" \"I fell when I was reaching something high up in the closet. I didn't get hurt. I was able to get up by myself.\"   -LB      Subjective Pain    Able to rate subjective pain? yes  -LB      Pre-Treatment Pain Level 0  -LB      Post-Treatment Pain Level 0  -LB      Exercise 7    Exercise Name 7 PERCH sitting with hips fexed at 60 degrees with hip flexion each LE (without UE support)   -LB      Cueing 7 Verbal  -LB      Reps 7 10  -LB      Exercise 8    Exercise Name 8 bilateral plantarflexion in standing with light UE support   -LB      Cueing 8 Verbal  -LB      Reps 8 10  -LB        User Key  (r) = Recorded By, (t) = Taken By, (c) = Cosigned By    Initials Name Provider Type    NGA Canales PT Physical Therapist                                  Therapy Education       12/05/17 1723          Therapy Education    Given Mobility training;Fall prevention and home safety   Steps with one quadtipped cane and minimal HHA.   -LB      How Provided Verbal  -LB      Provided to Patient  -LB      Level of Understanding Verbalized;Demonstrated;Teach back education performed  -LB        User Key  (r) = Recorded By, (t) = Taken By, (c) = Cosigned By    Initials Name " Provider Type    LB Deanna Canales, PT Physical Therapist                Time Calculation:   Start Time: 1430  Stop Time: 1515  Time Calculation (min): 45 min     Therapy Charges for Today     Code Description Service Date Service Provider Modifiers Qty    89866771829 HC PT NEUROMUSC RE EDUCATION EA 15 MIN 12/5/2017 Deanna Canales, PT GP 3                    Deanna Canales, PT  12/5/2017

## 2017-12-05 NOTE — THERAPY TREATMENT NOTE
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   • Cervical radiculopathy   • Degeneration of intervertebral disc of cervical region   • Degeneration of intervertebral disc of lumbar region   • Depression   • Fracture of distal end of radius   • Static tremor   • Family history of colon cancer   • Gastroesophageal reflux disease   • Headache   • History of respiratory system disease   • History of total hysterectomy with bilateral salpingo-oophorectomy (BSO)   • Hypothyroidism   • Irritable bowel syndrome   • Adiposity   • Osteopenia   • Partial epilepsy with impairment of consciousness   • Encounter for preprocedural cardiovascular examination   • Simple renal cyst   • Lesion of vulva   • 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 Cognitive deficit following cerebrovascular accident (CVA) I69.319 438.0   2. Cognitive communication deficit R41.841 799.52   3. CADASIL (cerebral AD arteriopathy w infarcts and leukoencephalopathy) I63.9 434.91    I67.89 323.81    G93.49 437.8                               SLP OP Goals       17 1500       Subjective Comments    Subjective Comments (P)  Mrs. Teixeira completed deductive reasoning hw assignment with 100% accuracy. Patient is doing well in therapy and is pleasant.  -NS     Subjective Pain    Able to rate subjective pain? (P)  yes  -NS     Pre-Treatment Pain Level (P)  0   -NS     Post-Treatment Pain Level (P)  0  -NS     Verbal Expression Goals    Verbal Expression LTG's (P)  Patient will be able to use verbal expressive language skills to communicate effectively in social/avocational/work setting  -NS     Patient will be able to use verbal expressive language skills to communicate effectively in social/avocational/work setting (P)  90%:;without cues  -NS     Status: Patient will be able to use verbal expressive language skills to communicate effectively in social/avocational/work setting (P)  Progressing as expected  -NS     Patient will improve verbal expressive language skills by describing single/multiple similarities and differences between two target items (P)  90%:;without cues  -NS     Status: Patient will improve verbal expressive language skills by describing single/multiple similarities and differences between two target items (P)  Progressing as expected  -NS     Comments: Patient will improve verbal expressive language skills by describing single/multiple similarities and differences between two target items (P)  80% without cues, 90% with min cues for 3 instances of word finding difficulties.   -NS     Auditory Comprehension Goals    Auditory Comprehension LTG's (P)  Patient will comprehend spoken information in all settings  -NS     Patient will comprehend spoken information in all settings (P)  90%:;without cues  -NS     Status: Patient will comprehend spoken information in all settings (P)  Progressing as expected  -NS     Patient will demonstrate comprehension of spoken language by answering questions about a multi paragraph length content (P)  90%:;without cues  -NS     Status: Patient will demonstrate comprehension of spoken language by answering questions about a multi paragraph length content (P)  Progressing as expected  -NS     Comments: Patient will demonstrate comprehension of spoken language by answering questions about a multi paragraph length content (P)   Ipad stories with questions about details completed with 80% accuracy. Patient seemed to be mroe impulsive toward end of task with decreased attention to detail in stories.   -NS     Memory Goals    Memory LTG's (P)  Patient will be able to remember information needed to participate in avocational activities  -NS     Status: Patient will be able to remember information needed to participate in avocational activities (P)  Progressing as expected  -NS     Patient will demonstrate improved ability to recall information by immediately recalling a series of words (P)  90%:;unrelated;without cues  -NS     Status: Patient will demonstrate improved ability to recall information by immediately recalling a series of words (P)  Progressing as expected  -NS     Comments: Patient will demonstrate improved ability to recall information by immediately recalling a series of words (P)  Immediate rcall of 4 words= 100%. Delayed recall of 4 words at 5 min= 25% without cues, 100% with min-mod cues, 15 min= 75% without cues, 100% with min cue, 30 min= 75% without cues, 100% with min cue.   -NS     Problem Solving Goals    Problem Solving LTG's (P)  Patient will be able to execute all activities necessary to manage a home;Patient will be able to engage in avocational activities requiring high level cognitive skills  -NS     Patient will be able to execute all activities necessary to manage a home (P)  Independently  -NS     Status: Patient will be able to execute all activities necessary to manage a home (P)  Progressing as expected  -NS     Patient will be able to engage in avocational activities requiring high level cognitive skills (P)  Independently  -NS     Status: Patient will be able to engage in avocational activities requiring high level cognitive skills (P)  Progressing as expected  -NS     Patient will improve ability to analyze problems and determine solutions by completing a visual representation/filling in a chart by  following  written directions (P)  90%:;without cues  -NS     Status: Patient will improve ability to analyze problems and determine solutions by completing a visual representation/filling in a chart by following  written directions (P)  Progressing as expected  -NS     Comments: Patient will improve ability to analyze problems and determine solutions by completing a visual representation/filling in a chart by following  written directions (P)  Schedule task involving organization and planning completed with 70% accuracy without cues, 80% when given min-mod cues for attention to detail.  -NS       User Key  (r) = Recorded By, (t) = Taken By, (c) = Cosigned By    Initials Name Provider Type    NS Em Harry Speech Therapy Student Speech Therapy Student                         Time Calculation:   SLP Start Time: (P) 1515  SLP Stop Time: (P) 1600  SLP Time Calculation (min): (P) 45 min    Therapy Charges for Today     Code Description Service Date Service Provider Modifiers Qty    [id number]  ST TREATMENT SPEECH 3 12/5/2017 Em Harry Speech Therapy Student GN 1                   Em Harry Speech Therapy Student  12/5/2017

## 2017-12-08 ENCOUNTER — HOSPITAL ENCOUNTER (OUTPATIENT)
Dept: PHYSICAL THERAPY | Facility: HOSPITAL | Age: 57
Setting detail: THERAPIES SERIES
Discharge: HOME | End: 2017-12-08
Attending: PSYCHIATRY & NEUROLOGY

## 2017-12-08 ENCOUNTER — HOSPITAL ENCOUNTER (OUTPATIENT)
Dept: SPEECH THERAPY | Facility: HOSPITAL | Age: 57
Setting detail: THERAPIES SERIES
Discharge: HOME | End: 2017-12-08

## 2017-12-08 DIAGNOSIS — R26.9 GAIT ABNORMALITY: Primary | ICD-10-CM

## 2017-12-08 DIAGNOSIS — I67.850 CADASIL (CEREBRAL AUTOSOMAL DOMINANT ARTERIOPATHY WITH SUBCORTICAL INFARCTS AND LEUKOENCEPHALOPATHY): ICD-10-CM

## 2017-12-08 DIAGNOSIS — I69.319 COGNITIVE DEFICIT FOLLOWING CEREBROVASCULAR ACCIDENT (CVA): Primary | ICD-10-CM

## 2017-12-08 DIAGNOSIS — R41.841 COGNITIVE COMMUNICATION DEFICIT: ICD-10-CM

## 2017-12-08 DIAGNOSIS — I67.850 CADASIL (CEREBRAL AD ARTERIOPATHY W INFARCTS AND LEUKOENCEPHALOPATHY): ICD-10-CM

## 2017-12-08 DIAGNOSIS — R26.0 ATAXIC GAIT: ICD-10-CM

## 2017-12-08 DIAGNOSIS — G45.9 TRANSIENT CEREBRAL ISCHEMIA, UNSPECIFIED TYPE: ICD-10-CM

## 2017-12-08 PROCEDURE — 92507 TX SP LANG VOICE COMM INDIV: CPT | Performed by: SPEECH-LANGUAGE PATHOLOGIST

## 2017-12-08 PROCEDURE — 97112 NEUROMUSCULAR REEDUCATION: CPT

## 2017-12-08 NOTE — THERAPY TREATMENT NOTE
"    Outpatient Physical Therapy Neuro Treatment Note  [facility]     Patient Name: Bhavana Teixeira  : 1960  MRN: [id number]  Today's Date: 2017      Visit Date: 2017    Visit Dx:    ICD-10-CM ICD-9-CM   1. Gait abnormality R26.9 781.2   2. Ataxic gait R26.0 781.2   3. Transient cerebral ischemia, unspecified type G45.9 435.9   4. 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   • Cervical radiculopathy   • Degeneration of intervertebral disc of cervical region   • Degeneration of intervertebral disc of lumbar region   • Depression   • Fracture of distal end of radius   • Static tremor   • Family history of colon cancer   • Gastroesophageal reflux disease   • Headache   • History of respiratory system disease   • History of total hysterectomy with bilateral salpingo-oophorectomy (BSO)   • Hypothyroidism   • Irritable bowel syndrome   • Adiposity   • Osteopenia   • Partial epilepsy with impairment of consciousness   • Encounter for preprocedural cardiovascular examination   • Simple renal cyst   • Lesion of vulva   • Prolapse 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   • Cerebrovascular accident   • Seizure   • Hemispheric carotid artery syndrome   • Transient ischemic attack   • Hypernatremia   • Hyperglycemia   • DNR (do not resuscitate)                 PT Neuro       17 1300          Subjective Comments    Subjective Comments \"My  is in the ER at [facility]. He fell off a ladder ontothe car and then the floor. He may have rib fractures.\" Pt reports this is her other cane she keeps in her car. (SBQC)  -LB      Precautions and Contraindications    Precautions/Limitations no known " precautions/limitations  -LB      Subjective Pain    Able to rate subjective pain? yes  -LB      Pre-Treatment Pain Level 0  -LB      Post-Treatment Pain Level 0  -LB      Subjective Pain Comment Pt denied any back pain.   -LB      Transfers    Transfers, Sit-Stand [place] conditional independence  -LB      Transfers, Stand-Sit [place] conditional independence  -LB      Transfer, Impairments impaired balance;strength decreased  -LB      Gait Assessment/Treatment    Gait, [place] Level stand by assist;verbal cues required  -LB      Gait, Assistive Device quad cane;other (see comments)   no devices, Pt's SBQC was adjusted for the R hand.   -LB      Gait, Distance (Feet) 100   with SBQC with pt placing wt on one tip of cane.  -LB      Gait, Gait Deviations stride width increased  -LB      Gait, Impairments strength decreased;impaired balance  -LB      Gait, Comment Pt ambulated ~ 100' x 2 with single point cane and 70' w/o a device.   -LB      Stairs Assessment/Treatment    Number of Stairs 3  -LB      Stairs, Handrail Location none  -LB      Stairs, [place] Level verbal cues required;contact guard assist  -LB      Stairs, Assistive Device quad cane  -LB      Stairs, Technique Used step to step (ascending);step to step (descending)  -LB      Stairs, Safety Issues other (see comments)  -LB      Stairs, Impairments impaired balance;strength decreased  -LB      Stairs, Comment curb with SBQC with SBA   -LB      Balance Skills Training    Standing-Level of Assistance Contact guard  -LB      Static Standing Balance Support No upper extremity supported  -LB      Standing-Balance Activities Compliant surfaces  -LB      Gait Balance-Level of Assistance Contact guard  -LB      Gait Balance Support Right upper extremity supported;Left upper extremity supported   light UE support   -LB      Gait Balance Activities scanning environment R/L;braiding / front;tandem  -LB        User Key  (r) = Recorded By, (t) =  "Taken By, (c) = Cosigned By    Initials Name Provider Type    NGA Canales PT Physical Therapist                        PT Assessment/Plan       12/08/17 1548       PT Assessment    Assessment Comments Pt arrived to PT with a SBQC adjusted for the wrong hand. Pt's cane adjusted for the right hand. Pt ambulated with the SBQC with only placing weight on one tip. Advised pt to use the SBQC for steps without a rail. Pt is able to safely ambulate with a single point cane with a more continuous and safe gati pattern.   -LB       User Key  (r) = Recorded By, (t) = Taken By, (c) = Cosigned By    Initials Name Provider Type    NGA Canales PT Physical Therapist                     Exercises       12/08/17 1300          Subjective Comments    Subjective Comments \"My  is in the ER at [facility]. He fell off a ladder ontothe car and then the floor. He may have rib fractures.\" Pt reports this is her other cane she keeps in her car. (SBQC)  -LB      Subjective Pain    Able to rate subjective pain? yes  -LB      Pre-Treatment Pain Level 0  -LB      Post-Treatment Pain Level 0  -LB      Subjective Pain Comment Pt denied any back pain.   -LB        User Key  (r) = Recorded By, (t) = Taken By, (c) = Cosigned By    Initials Name Provider Type    NGA Canales PT Physical Therapist                                  Therapy Education       12/08/17 1545          Therapy Education    Education Details Pt's SBQC adjusted for the right hand. Advised pt to use her quad cane for steps only and to bernabe her rollator or single point cane for ambulation.   -LB      Given Mobility training;Fall prevention and home safety   Stairs with curb.   -LB      How Provided Verbal  -LB      Provided to Patient  -LB      Level of Understanding Verbalized;Demonstrated;Teach back education performed  -LB        User Key  (r) = Recorded By, (t) = Taken By, (c) = Cosigned By    Initials Name Provider Type    NGA Canales PT Physical " Therapist                Time Calculation:   Start Time: 1300  Stop Time: 1346  Time Calculation (min): 46 min     Therapy Charges for Today     Code Description Service Date Service Provider Modifiers Qty    78447806233 HC PT NEUROMUSC RE EDUCATION EA 15 MIN 12/8/2017 Deanna Canales, PT KX, GP 3                    Deanna Canales, PT  12/8/2017

## 2017-12-08 NOTE — THERAPY TREATMENT NOTE
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   • Cervical radiculopathy   • Degeneration of intervertebral disc of cervical region   • Degeneration of intervertebral disc of lumbar region   • Depression   • Fracture of distal end of radius   • Static tremor   • Family history of colon cancer   • Gastroesophageal reflux disease   • Headache   • History of respiratory system disease   • History of total hysterectomy with bilateral salpingo-oophorectomy (BSO)   • Hypothyroidism   • Irritable bowel syndrome   • Adiposity   • Osteopenia   • Partial epilepsy with impairment of consciousness   • Encounter for preprocedural cardiovascular examination   • Simple renal cyst   • Lesion of vulva   • 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 Cognitive deficit following cerebrovascular accident (CVA) I69.319 438.0   2. Cognitive communication deficit R41.841 799.52   3. CADASIL (cerebral AD arteriopathy w infarcts and leukoencephalopathy) I63.9 434.91    I67.89 323.81    G93.49 437.8                               SLP OP Goals       17 1500       Subjective Comments    Subjective Comments Mrs Teixeira is pleasant and cooperative during therapy. She is demonstrating progress with memory and reasoning tasks, difficulty with functional math word problems.   -JENNIFER     Subjective Pain    Able to rate subjective pain? yes  -JENNIFER      Pre-Treatment Pain Level 0  -KA     Post-Treatment Pain Level 0  -KA     Verbal Expression Goals    Patient will be able to use verbal expressive language skills to communicate effectively in social/avocational/work setting 90%:;without cues  -KA     Status: Patient will be able to use verbal expressive language skills to communicate effectively in social/avocational/work setting Progressing as expected  -KA     Patient will improve verbal expressive language skills by completing divergent naming tasks 90%:;without cues  -KA     Status: Patient will improve verbal expressive language skills by completing divergent naming tasks Progressing as expected  -KA     Comments: Patient will improve verbal expressive language skills by completing divergent naming tasks average of 6 across three trials in abstract divergent language tasks, and 8 with min cues  -KA     Auditory Comprehension Goals    Patient will comprehend spoken information in all settings 90%:;without cues  -KA     Status: Patient will comprehend spoken information in all settings Progressing as expected  -KA     Patient will improve comprehension of spoken language by following Multi-step directions 90%:;without cues  -KA     Status: Patient will improve comprehension of spoken language by following Multi-step directions Progressing as expected  -KA     Comments: Patient will improve comprehension of spoken language by following Multi-step directions Following two step auditory directions, patient required repetitions for each task for 90% accuracy  -KA     Memory Goals    Status: Patient will be able to remember information needed to participate in avocational activities Progressing as expected  -KA     Patient will demonstrate improved ability to recall information by immediately recalling a series of words 90%:;unrelated;without cues  -KA     Status: Patient will demonstrate improved ability to recall information by immediately recalling a series of  words Progressing as expected  -KA     Comments: Patient will demonstrate improved ability to recall information by immediately recalling a series of words Immediate recall of four wrods and answering question on four words in word list retention=100%, accuracy decreased to 60% with five words, delayed recall of three unrelated words after 20 minute delay=100%  -KA     Problem Solving Goals    Patient will be able to execute all activities necessary to manage a home Independently  -KA     Status: Patient will be able to execute all activities necessary to manage a home Progressing as expected  -KA     Patient will be able to engage in avocational activities requiring high level cognitive skills Independently  -KA     Status: Patient will be able to engage in avocational activities requiring high level cognitive skills Progressing as expected  -KA     Patient will improve ability to analyze problems and determine solutions by completing a visual representation/filling in a chart by following  written directions 90%:;without cues  -KA     Status: Patient will improve ability to analyze problems and determine solutions by completing a visual representation/filling in a chart by following  written directions Progressing as expected  -KA     Comments: Patient will improve ability to analyze problems and determine solutions by completing a visual representation/filling in a chart by following  written directions complex deductive reasoning puzzle=80% without cues and 90% with min cues slower processing  -KA     Patient will improve ability to analyze problems and determine solutions by completing functional math problems 90%:;without cues  -KA     Status: Patient will improve ability to analyze problems and determine solutions by completing functional math problems --   ongoing  -KA     Comments: Patient will improve ability to analyze problems and determine solutions by completing functional math problems 40% without  cues and 90% with mod to max cues, difficulty with simple math and clock math homework provided  -JENNIFER       User Key  (r) = Recorded By, (t) = Taken By, (c) = Cosigned By    Initials Name Provider Type    KA Gregor Phan MA,CCC-SLP Speech and Language Pathologist                         Time Calculation:   SLP Start Time: 1400  SLP Stop Time: 1500  SLP Time Calculation (min): 60 min    Therapy Charges for Today     Code Description Service Date Service Provider Modifiers Qty    [id number] [facility] TREATMENT SPEECH 4 12/8/2017 Gregor Phan MA,CCC-SLP GN 1                   Gregor Phan MA,CCC-SLP  12/8/2017

## 2017-12-12 ENCOUNTER — TELEPHONE (OUTPATIENT)
Dept: PHYSICAL THERAPY | Facility: HOSPITAL | Age: 57
End: 2017-12-12

## 2017-12-12 ENCOUNTER — APPOINTMENT (OUTPATIENT)
Dept: SPEECH THERAPY | Facility: HOSPITAL | Age: 57
End: 2017-12-12

## 2017-12-12 ENCOUNTER — APPOINTMENT (OUTPATIENT)
Dept: PHYSICAL THERAPY | Facility: HOSPITAL | Age: 57
End: 2017-12-12
Attending: PSYCHIATRY & NEUROLOGY

## 2017-12-15 ENCOUNTER — APPOINTMENT (OUTPATIENT)
Dept: PHYSICAL THERAPY | Facility: HOSPITAL | Age: 57
End: 2017-12-15
Attending: PSYCHIATRY & NEUROLOGY

## 2017-12-15 ENCOUNTER — APPOINTMENT (OUTPATIENT)
Dept: SPEECH THERAPY | Facility: HOSPITAL | Age: 57
End: 2017-12-15

## 2017-12-19 ENCOUNTER — APPOINTMENT (OUTPATIENT)
Dept: PHYSICAL THERAPY | Facility: HOSPITAL | Age: 57
End: 2017-12-19
Attending: PSYCHIATRY & NEUROLOGY

## 2017-12-19 ENCOUNTER — APPOINTMENT (OUTPATIENT)
Dept: SPEECH THERAPY | Facility: HOSPITAL | Age: 57
End: 2017-12-19

## 2017-12-22 ENCOUNTER — HOSPITAL ENCOUNTER (OUTPATIENT)
Dept: SPEECH THERAPY | Facility: HOSPITAL | Age: 57
Setting detail: THERAPIES SERIES
Discharge: HOME | End: 2017-12-22

## 2017-12-22 ENCOUNTER — HOSPITAL ENCOUNTER (OUTPATIENT)
Dept: PHYSICAL THERAPY | Facility: HOSPITAL | Age: 57
Setting detail: THERAPIES SERIES
Discharge: HOME | End: 2017-12-22
Attending: PSYCHIATRY & NEUROLOGY

## 2017-12-22 DIAGNOSIS — R41.841 COGNITIVE COMMUNICATION DEFICIT: ICD-10-CM

## 2017-12-22 DIAGNOSIS — G45.9 TRANSIENT CEREBRAL ISCHEMIA, UNSPECIFIED TYPE: ICD-10-CM

## 2017-12-22 DIAGNOSIS — I69.319 COGNITIVE DEFICIT FOLLOWING CEREBROVASCULAR ACCIDENT (CVA): Primary | ICD-10-CM

## 2017-12-22 DIAGNOSIS — I67.850 CADASIL (CEREBRAL AD ARTERIOPATHY W INFARCTS AND LEUKOENCEPHALOPATHY): ICD-10-CM

## 2017-12-22 DIAGNOSIS — R26.9 GAIT ABNORMALITY: Primary | ICD-10-CM

## 2017-12-22 DIAGNOSIS — I67.850 CADASIL (CEREBRAL AUTOSOMAL DOMINANT ARTERIOPATHY WITH SUBCORTICAL INFARCTS AND LEUKOENCEPHALOPATHY): ICD-10-CM

## 2017-12-22 DIAGNOSIS — R26.0 ATAXIC GAIT: ICD-10-CM

## 2017-12-22 PROCEDURE — G8980 MOBILITY D/C STATUS: HCPCS

## 2017-12-22 PROCEDURE — G8978 MOBILITY CURRENT STATUS: HCPCS

## 2017-12-22 PROCEDURE — 97532: CPT

## 2017-12-22 PROCEDURE — 97112 NEUROMUSCULAR REEDUCATION: CPT

## 2017-12-22 PROCEDURE — G8979 MOBILITY GOAL STATUS: HCPCS

## 2017-12-22 NOTE — THERAPY TREATMENT NOTE
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   • Cervical radiculopathy   • Degeneration of intervertebral disc of cervical region   • Degeneration of intervertebral disc of lumbar region   • Depression   • Fracture of distal end of radius   • Static tremor   • Family history of colon cancer   • Gastroesophageal reflux disease   • Headache   • History of respiratory system disease   • History of total hysterectomy with bilateral salpingo-oophorectomy (BSO)   • Hypothyroidism   • Irritable bowel syndrome   • Adiposity   • Osteopenia   • Partial epilepsy with impairment of consciousness   • Encounter for preprocedural cardiovascular examination   • Simple renal cyst   • Lesion of vulva   • 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 Cognitive deficit following cerebrovascular accident (CVA) I69.319 438.0   2. CADASIL (cerebral AD arteriopathy w infarcts and leukoencephalopathy) I63.9 434.91    I67.89 323.81    G93.49 437.8   3. Cognitive communication deficit R41.841 799.52                               SLP OP Goals       17 1556 17 1500    Goal Type Needed    Goal Type Needed  Verbal Expression;Auditory Comprehension;Memory;Probelm Solving  -SA    Subjective Comments    Subjective Comments  Pt tearful and states she is frustrated when she \"can't think of a word or something she should know, especially math\"  " -SA    Subjective Pain    Able to rate subjective pain?  yes  -SA    Pre-Treatment Pain Level  0  -SA    Post-Treatment Pain Level  0  -SA    Verbal Expression Goals    Verbal Expression LTG's  Patient will be able to use verbal expressive language skills to communicate effectively in social/avocational/work setting  -SA    Patient will be able to use verbal expressive language skills to communicate effectively in social/avocational/work setting  90%:;without cues  -SA    Status: Patient will be able to use verbal expressive language skills to communicate effectively in social/avocational/work setting  Progressing as expected  -SA    Verbal Expression STG's  Patient will improve verbal expressive language skills by expressing complex concepts and ideas;Patient will improve verbal expressive language skills by describing attributes, function, action and/or uses of an object/item;Patient will improve verbal expressive language skills by completing divergent naming tasks;Patient will improve verbal expressive language skills by describing single/multiple similarities and differences between two target items  -SA    Patient will improve verbal expressive language skills by describing single/multiple similarities and differences between two target items  90%:;without cues  -SA    Status: Patient will improve verbal expressive language skills by describing single/multiple similarities and differences between two target items  Progressing as expected  -SA    Comments: Patient will improve verbal expressive language skills by describing single/multiple similarities and differences between two target items  Per last data session, pt stated similarities/differences b/w 2 objects using grammatically correct sentences w/ 80% accuracy w/o cues; and 90% w/ min cues.   -SA    Patient will improve verbal expressive language skills by completing divergent naming tasks  90%:;without cues  -SA    Status: Patient will improve verbal  expressive language skills by completing divergent naming tasks  Progressing as expected  -SA    Comments: Patient will improve verbal expressive language skills by completing divergent naming tasks  Per last data sessins, pt able to complete divergent tasks w/ cues  -SA    Patient will improve verbal expressive language skills by describing attributes, function, action and/or uses of an object/item  90%:;without cues  -SA    Status: Patient will improve verbal expressive language skills by describing attributes, function, action and/or uses of an object/item  New  -SA    Comments: Patient will improve verbal expressive language skills by describing attributes, function, action and/or uses of an object/item  Not yet addressed this objective  -SA    Patient will improve verbal expressive language skills by expressing complex concepts and ideas  90%:;without cues  -SA    Status: Patient will improve verbal expressive language skills by expressing complex concepts and ideas  New  -SA    Comments: Patient will improve verbal expressive language skills by expressing complex concepts and ideas  Not yet addressed this objective  -SA    Auditory Comprehension Goals    Auditory Comprehension LTG's  Patient will comprehend spoken information in all settings  -SA    Patient will comprehend spoken information in all settings  90%:;without cues  -SA    Status: Patient will comprehend spoken information in all settings  Progressing as expected  -SA    Auditory Comprehension STG's  Patient will improve comprehension of spoken language by following Multi-step directions;Patient will demonstrate comprehension of spoken language by answering questions about a multi paragraph length content  -SA    Patient will improve comprehension of spoken language by following Multi-step directions  90%:;without cues  -SA    Status: Patient will improve comprehension of spoken language by following Multi-step directions  Progressing as expected   -SA    Comments: Patient will improve comprehension of spoken language by following Multi-step directions  Pt  worked on written task in which SLP read 2-3 step commands aloud to pt and pt had to follow w/ 92% accuracy w/ mod cues/repetition.   -SA    Patient will demonstrate comprehension of spoken language by answering questions about a multi paragraph length content  90%:;without cues  -SA    Status: Patient will demonstrate comprehension of spoken language by answering questions about a multi paragraph length content  Progressing as expected  -SA    Comments: Patient will demonstrate comprehension of spoken language by answering questions about a multi paragraph length content  Per last data session, stories were read w/ questions about details of the story w/ 80% accuracy. During that session, pt became more impulsive towards the end of the task w/ decreased attention to detail in the stories.   -SA    Memory Goals    Memory LTG's  Patient will be able to remember information needed to participate in avocational activities  -SA    Status: Patient will be able to remember information needed to participate in avocational activities  Progressing as expected  -SA    Memory STG's  Patient will demonstrate improved ability to recall information by immediately recalling a series of words  -SA    Patient will demonstrate improved ability to recall information by immediately recalling a series of words  90%:;without cues  -SA    Status: Patient will demonstrate improved ability to recall information by immediately recalling a series of words  Progressing as expected  -SA    Comments: Patient will demonstrate improved ability to recall information by immediately recalling a series of words  Pt is progressing on this task and initially had immediate recall of mental manipulation task w/50% w/o cues and 75% w/ cues. During tx today she was able to immediately recall words w/ 88% accuracy w/ minimal verbal cues/repetition.    -SA    Problem Solving Goals    Problem Solving LTG's  Patient will be able to execute all activities necessary to manage a home;Patient will be able to engage in avocational activities requiring high level cognitive skills  -SA    Patient will be able to execute all activities necessary to manage a home  Independently  -SA    Status: Patient will be able to execute all activities necessary to manage a home  Progressing as expected  -SA    Patient will be able to engage in avocational activities requiring high level cognitive skills  Independently  -SA    Status: Patient will be able to engage in avocational activities requiring high level cognitive skills  Progressing as expected  -SA    Problem Solving STG's  Patient will improve ability to analyze problems and determine solutions by completing a visual representation/filling in a chart by following  written directions;Patient will improve ability to analyze problems and determine solutions by completing functional math problems  -SA    Patient will improve ability to analyze problems and determine solutions by completing a visual representation/filling in a chart by following  written directions  90%:;without cues  -SA    Status: Patient will improve ability to analyze problems and determine solutions by completing a visual representation/filling in a chart by following  written directions  Progressing as expected  -SA    Comments: Patient will improve ability to analyze problems and determine solutions by completing a visual representation/filling in a chart by following  written directions  Initially pt had difficulty w/ deductive reasoning puzzle w/ 70-80% accuracy. For recent homework, pt achieved 100% accuracy w/ dedcutive reasoning. Pt stated it did not take her long to complete.  -SA    Patient will improve ability to analyze problems and determine solutions by completing functional math problems  90%:;without cues  -SA    Status: Patient will improve  ability to analyze problems and determine solutions by completing functional math problems  Progressing as expected  -SA    Comments: Patient will improve ability to analyze problems and determine solutions by completing functional math problems  Initially pt w/ great difficulty w/ basic math problem solving w/ 50% accuracy w/o cues and 90% w/ mod cues. During the sessin today, pt able to complet functional math word problem solving w/ 75% accuracy. Pt w/ difficulty processing towards the end of the worksheet.   -SA    Patient will improve ability to analyze problems and determine solutions by completing high level math problems  90%:;without cues  -SA    Status: Patient will improve ability to analyze problems and determine solutions by completing high level math problems  New  -SA    Comments: Patient will improve ability to analyze problems and determine solutions by completing high level math problems  Not yet addressed  -SA    SLP Time Calculation    SLP Goal Re-Cert Due Date 01/20/18  -SA 01/20/18  -      User Key  (r) = Recorded By, (t) = Taken By, (c) = Cosigned By    Initials Name Provider Type    SA Anamaria Navarro MS CCC-SLP Speech and Language Pathologist                OP SLP Education       12/22/17 5048    Education    Barriers to Learning No barriers identified  -    Education Provided Patient participated in establishing goals and treatment plan;Patient requires further education on strategies, risks;Patient demonstrated recommended strategies  -    Assessed Learning needs;Learning motivation  -    Learning Motivation Strong  -    Learning Method Explanation;Written materials  -    Teaching Response Verbalized understanding  -      User Key  (r) = Recorded By, (t) = Taken By, (c) = Cosigned By    Initials Name Effective Dates    SA Anamaria Navarro MS CCC-SLP 07/25/17 -                 OP SLP Assessment/Plan - 12/22/17 3359     SLP Assessment    Functional Problems Speech Language-  Adult/Cognition  -    Impact on Function: Adult Speech Language/Cognition Difficulty following directions;Restrictions in personal and social life;Trouble learning or remembering new information  -SA    Clinical Impression: Speech Language-Adult/Congnition Mild:;Cognitive Communication Impairment  -SA    Please refer to paper survey for additional self-reported information Yes  -SA    Please refer to items scanned into chart for additional diagnostic informaiton and handouts as provided by clinician Yes  -SA    Prognosis Good (comment)  -SA    Patient/caregiver participated in establishment of treatment plan and goals Yes  -SA    Patient would benefit from skilled therapy intervention Yes  -SA    SLP Plan    Frequency 2X a week  -SA    Duration 4 weeks  -SA    Planned CPT's? SLP INDIVIDUAL SPEECH THERAPY: 15180  -    Expected Duration Therapy Session (min) 45-60 minutes  -      User Key  (r) = Recorded By, (t) = Taken By, (c) = Cosigned By    Initials Name Provider Type    SA Anamaria Navarro MS CCC-SLP Speech and Language Pathologist             SLP Outcome Measures (last 72 hours)      SLP Outcome Measures       12/22/17 1500          SLP Outcome Measures    Outcome Measure Used? Adult NOMS  -      FCM Scores    FCM Chosen Spoken Language Comprehension;Memory;Problem Solving  -      Spoken Language Comprehension FCM Score 5  -SA      Memory FCM Score 4  -SA      Problem Solving FCM Score 4  -SA        User Key  (r) = Recorded By, (t) = Taken By, (c) = Cosigned By    Initials Name Effective Dates     Anamaria Navarro MS CCC-SLP 07/25/17 -              Time Calculation:   SLP Start Time: 1400  SLP Stop Time: 1500  SLP Time Calculation (min): 60 min  SLP Non-Billable Time (min): 60 min    Therapy Charges for Today     Code Description Service Date Service Provider Modifiers Qty    [id number]  ST DEV OF COGN SKILLS EACH 15 MIN 12/22/2017 Anamaria Navarro MS CCC-SLP GN 4          SLP G-Codes  Functional Limitations:  Spoken language comprehension        Anamaria Navarro MS CCC-SLP  12/22/2017

## 2017-12-22 NOTE — THERAPY DISCHARGE NOTE
"      Outpatient Physical Therapy Neuro Treatment Note/Discharge Summary  [facility]     Patient Name: Bhavana Teixeira  : 1960  MRN: [id number]  Today's Date: 2017      Visit Date: 2017    Visit Dx:    ICD-10-CM ICD-9-CM   1. Gait abnormality R26.9 781.2   2. Ataxic gait R26.0 781.2   3. Transient cerebral ischemia, unspecified type G45.9 435.9   4. 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   • Cervical radiculopathy   • Degeneration of intervertebral disc of cervical region   • Degeneration of intervertebral disc of lumbar region   • Depression   • Fracture of distal end of radius   • Static tremor   • Family history of colon cancer   • Gastroesophageal reflux disease   • Headache   • History of respiratory system disease   • History of total hysterectomy with bilateral salpingo-oophorectomy (BSO)   • Hypothyroidism   • Irritable bowel syndrome   • Adiposity   • Osteopenia   • Partial epilepsy with impairment of consciousness   • Encounter for preprocedural cardiovascular examination   • Simple renal cyst   • Lesion of vulva   • Prolapse 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   • Cerebrovascular accident   • Seizure   • Hemispheric carotid artery syndrome   • Transient ischemic attack   • Hypernatremia   • Hyperglycemia   • DNR (do not resuscitate)                  PT Neuro       17 1315          Subjective Comments    Subjective Comments \"Doing good.\" \"No falls.\" \"My  broke 3 ribs and had walking pneumonia.\" Pt denied any dizziness.  -LB      Precautions and Contraindications    Precautions/Limitations no known precautions/limitations  -LB      Subjective Pain    Able to " "rate subjective pain? yes  -LB      Pre-Treatment Pain Level 0  -LB      Post-Treatment Pain Level 0  -LB      Cognition    Overall Cognitive Status WFL  -LB      Transfers    Transfers, Sit-Stand Yates independent  -LB      Transfers, Stand-Sit Yates independent  -LB      Transfers, Sit-Stand-Sit, Assist Device other (see comments)   no device  -LB      Transfer, Comment Pt verbally reviewed floor transfers into all fours position with UE support.   -LB      Gait Assessment/Treatment    Gait, Yates Level conditional independence  -LB      Gait, Assistive Device --   no device   -LB      Gait, Distance (Feet) 300   x 2   -LB      Gait, Gait Deviations weight-shifting ability decreased   decreased wt shift forward over LE's,corrects w/ verbal cues  -LB      Gait, Comment Pt arrived ambulating with the quad tipped cane   -LB      Stairs Assessment/Treatment    Number of Stairs 4  -LB      Stairs, Handrail Location none  -LB      Stairs, Yates Level conditional independence  -LB      Stairs, Assistive Device other (see comments)   quad tipped cane   -LB      Stairs, Technique Used step to step (ascending);step to step (descending)  -LB      Stairs, Comment 4 steps with one rail conditional independent step over step , curb independently without a device   -LB      Balance Skills Training    Standing-Level of Assistance Close supervision  -LB      Static Standing Balance Support No upper extremity supported  -LB      Standing-Balance Activities Compliant surfaces   4\" foam   -LB      Gait Balance-Level of Assistance Independent  -LB      Gait Balance Support No upper extremity supported  -LB      Gait Balance Activities scanning environment R/L;side-stepping;backwards  -LB      Rhomberg 30 seconds with and without a device  -LB        User Key  (r) = Recorded By, (t) = Taken By, (c) = Cosigned By    Initials Name Provider Type    LB Deanna Canales, PT Physical Therapist                      " "  PT Assessment/Plan       12/22/17 1504       PT Assessment    Assessment Comments Pt reporting no falls and no dizziness. Pt demonstrating improvement with gait and balance as indicated by the WHITE and Dynamic Gait index.  Pt was able to ambulate without a device with decreased gait deviations and no loss of balance.   -LB       User Key  (r) = Recorded By, (t) = Taken By, (c) = Cosigned By    Initials Name Provider Type    NGA Canales, PT Physical Therapist                     Exercises       12/22/17 1315          Subjective Comments    Subjective Comments \"Doing good.\" \"No falls.\" \"My  broke 3 ribs and had walking pneumonia.\" Pt denied any dizziness.  -LB      Subjective Pain    Able to rate subjective pain? yes  -LB      Pre-Treatment Pain Level 0  -LB      Post-Treatment Pain Level 0  -LB        User Key  (r) = Recorded By, (t) = Taken By, (c) = Cosigned By    Initials Name Provider Type    LB Deanna Canales, PT Physical Therapist                              PT OP Goals       12/22/17 1500       PT Short Term Goals    STG 1 Pt to ambulate up/down 6' step with SBA without LOB.  -LB     STG 1 Progress Met  -LB     STG 2 Pt to perform balance activities in modifed Rhomberg position with CGA in order to improve dynamic standing balance.  -LB     STG 2 Progress Met  -LB     STG 3 Pt to perform standing balance activities on dynamic surfaces with CGA .  -LB     STG 3 Progress Met  -LB     Long Term Goals    LTG 1 Pt to ambulate up/down 6\" step modified independent level with use of assist device as needed.  -LB     LTG 1 Progress Met  -LB     LTG 2 Pt to demonstrate improved dynamic standing balance as demonstrated by improved WHITE balance score of 45/56 or better.   -LB     LTG 2 Progress Met  -LB     LTG 2 Progress Comments Pt scored a 49/56.  -LB     LTG 3 Pt to ambulate up/down 4 steps with modified independence and no LOB.   -LB     LTG 3 Progress Met  -LB     LTG 4 Pt to perform standing balance " acitivities on dynamic surfaces with SBA.   -LB     LTG 4 Progress Met  -LB     LTG 5 Pt to score a 18/24 on the Dynamci Gait index to reduce risk of falls.   -LB     LTG 5 Progress Met  -LB     LTG 5 Progress Comments Pt scored a 21/24.   -LB       User Key  (r) = Recorded By, (t) = Taken By, (c) = Cosigned By    Initials Name Provider Type    NGA Canales, PT Physical Therapist          Therapy Education  Education Details: Reviewed with pt the proper technique for floor transfers. Discussed her improvement on the FAGAN.  Given: Fall prevention and home safety, Mobility training  How Provided: Verbal  Provided to: Patient  Level of Understanding: Verbalized    Outcome Measure Options: Fagan Balance, Dynamic Gait Index  Fagan Balance Scale  Sitting to Standing: able to stand without using hands and stabilize independently  Standing Unsupported: able to stand safely for 2 minutes  Sitting with Back Unsupported but Feet Supported on Floor or on Stool: able to sit safely and securely for 2 minutes  Standing to Sitting: sits safely with minimal use of hands  Transfers: able to transfer safely with minor use of hands  Standing Unsupported with Eyes Closed: able to stand 10 seconds safely  Standing Unsupported with Feet Together: able to place feet together independently and stand 1 minute safely  Reaching Forward with Outstretched Arm While Standing: can reach forward confidently 25 cm (10 inches)   Object From the Floor From a Standing Position: able to  object safely and easily  Turning to Look Behind Over Left and Right Shoulders While Standing: looks behind from both sides and weight shifts well  Turn 360 Degrees: able to turn 360 degrees safely in 4 seconds or less  Place Alternate Foot on Step or Stool While Standing Unsupported: able to complete 4 steps without aid with supervision  Standing Unsupported with One Foot in Front: able to take small step independently and hold 30 seconds  Standing on  One Leg: tries to lift leg unable to hold 3 seconds but remains standing independently  Fagan Total Score: 49  Dynamic Gait Index (DGI)  Gait Level Surface: Normal: walks 20’, no assistive devices, good speed, no evidence for imbalance, normal gait pattern  Change in Gait Speed: Normal: Able to smoothly change walking speed without loss of balance or gait deviation. Shows significant difference in walking speeds between normal, fast and slow paces.  Gait with Horizontal Head Turns: Mild impairment: Performs head turns smoothly with slight change in gait velocity, i.e. minor disruption to smooth gait path or uses walking aid.  Gait with Vertical Head Turns: Mild impairment: Performs head turns smoothly with slight change in gait velocity, i.e. minor disruption to smooth gait path or uses walking aid.  Gait and Pivot Turn: Normal: Pivot turns safely within 3 seconds and stops quickly with no loss of balance.  Step Over Obstacle: Normal: Is able to step over box without changing gait speed, no evidence for imbalance.  Step Around Obstacles: Normal: Is able to walk safely around cones safely without changing gait speed, no evidence of imbalance.  Steps: Mild impairment: Alternating feet, must use rail.  Dynamic Gait Index Score: 21  Dynamic Gait Index Comments: no device     Time Calculation:   Start Time: 1315  Stop Time: 1359  Time Calculation (min): 44 min     Therapy Charges for Today     Code Description Service Date Service Provider Modifiers Qty    25492137271 HC PT MOBILITY CURRENT 12/22/2017 Deanna Canales, PT GP, CI 1    [id number] HC PT MOBILITY PROJECTED 12/22/2017 Deanna Canales, PT GP, CI 1    80280851938 HC PT NEUROMUSC RE EDUCATION EA 15 MIN 12/22/2017 Deanna Canales, PT GP 3          PT G-Codes  PT Professional Judgement Used?: Yes  Outcome Measure Options: Fagan Balance, Dynamic Gait Index  Mobility: Walking and Moving Around Current Status (): At least 1 percent but less than 20 percent impaired,  limited or restricted  Mobility: Walking and Moving Around Goal Status (): At least 1 percent but less than 20 percent impaired, limited or restricted     OP PT Discharge Summary  Date of Discharge: 12/22/17  Reason for Discharge: All goals achieved        Deanna Canales, PT  12/22/2017

## 2017-12-22 NOTE — THERAPY DISCHARGE NOTE
"      Outpatient Physical Therapy Neuro Treatment Note/Discharge Summary  [facility]     Patient Name: Bhavana Teixeira  : 1960  MRN: [id number]  Today's Date: 2017      Visit Date: 2017    Visit Dx:    ICD-10-CM ICD-9-CM   1. Gait abnormality R26.9 781.2   2. Ataxic gait R26.0 781.2   3. Transient cerebral ischemia, unspecified type G45.9 435.9   4. 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   • Cervical radiculopathy   • Degeneration of intervertebral disc of cervical region   • Degeneration of intervertebral disc of lumbar region   • Depression   • Fracture of distal end of radius   • Static tremor   • Family history of colon cancer   • Gastroesophageal reflux disease   • Headache   • History of respiratory system disease   • History of total hysterectomy with bilateral salpingo-oophorectomy (BSO)   • Hypothyroidism   • Irritable bowel syndrome   • Adiposity   • Osteopenia   • Partial epilepsy with impairment of consciousness   • Encounter for preprocedural cardiovascular examination   • Simple renal cyst   • Lesion of vulva   • Prolapse 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   • Cerebrovascular accident   • Seizure   • Hemispheric carotid artery syndrome   • Transient ischemic attack   • Hypernatremia   • Hyperglycemia   • DNR (do not resuscitate)                  PT Neuro       17 1315          Subjective Comments    Subjective Comments \"Doing good.\" \"No falls.\" \"My  broke 3 ribs and had walking pneumonia.\" Pt denied any dizziness.  -LB      Precautions and Contraindications    Precautions/Limitations no known precautions/limitations  -LB      Subjective Pain    Able to " "rate subjective pain? yes  -LB      Pre-Treatment Pain Level 0  -LB      Post-Treatment Pain Level 0  -LB      Cognition    Overall Cognitive Status WFL  -LB      Transfers    Transfers, Sit-Stand Grundy independent  -LB      Transfers, Stand-Sit Grundy independent  -LB      Transfers, Sit-Stand-Sit, Assist Device other (see comments)   no device  -LB      Transfer, Comment Pt verbally reviewed floor transfers into all fours position with UE support.   -LB      Gait Assessment/Treatment    Gait, Grundy Level conditional independence  -LB      Gait, Assistive Device --   no device   -LB      Gait, Distance (Feet) 300   x 2   -LB      Gait, Gait Deviations weight-shifting ability decreased   decreased wt shift forward over LE's,corrects w/ verbal cues  -LB      Gait, Comment Pt arrived ambulating with the quad tipped cane   -LB      Stairs Assessment/Treatment    Number of Stairs 4  -LB      Stairs, Handrail Location none  -LB      Stairs, Grundy Level conditional independence  -LB      Stairs, Assistive Device other (see comments)   quad tipped cane   -LB      Stairs, Technique Used step to step (ascending);step to step (descending)  -LB      Stairs, Comment 4 steps with one rail conditional independent step over step , curb independently without a device   -LB      Balance Skills Training    Standing-Level of Assistance Close supervision  -LB      Static Standing Balance Support No upper extremity supported  -LB      Standing-Balance Activities Compliant surfaces   4\" foam   -LB      Gait Balance-Level of Assistance Independent  -LB      Gait Balance Support No upper extremity supported  -LB      Gait Balance Activities scanning environment R/L;side-stepping;backwards  -LB      Rhomberg 30 seconds with and without a device  -LB        User Key  (r) = Recorded By, (t) = Taken By, (c) = Cosigned By    Initials Name Provider Type    LB Deanna Canales, PT Physical Therapist                      " "  PT Assessment/Plan       12/22/17 1504       PT Assessment    Assessment Comments Pt reporting no falls and no dizziness. Pt demonstrating improvement with gait and balance as indicated by the WHITE and Dynamic Gait index.  Pt was able to ambulate without a device with decreased gait deviations and no loss of balance.   -LB       User Key  (r) = Recorded By, (t) = Taken By, (c) = Cosigned By    Initials Name Provider Type    NGA Canales, PT Physical Therapist                     Exercises       12/22/17 1315          Subjective Comments    Subjective Comments \"Doing good.\" \"No falls.\" \"My  broke 3 ribs and had walking pneumonia.\" Pt denied any dizziness.  -LB      Subjective Pain    Able to rate subjective pain? yes  -LB      Pre-Treatment Pain Level 0  -LB      Post-Treatment Pain Level 0  -LB        User Key  (r) = Recorded By, (t) = Taken By, (c) = Cosigned By    Initials Name Provider Type    LB Deanna Canales, PT Physical Therapist                              PT OP Goals       12/22/17 1500       PT Short Term Goals    STG 1 Pt to ambulate up/down 6' step with SBA without LOB.  -LB     STG 1 Progress Met  -LB     STG 2 Pt to perform balance activities in modifed Rhomberg position with CGA in order to improve dynamic standing balance.  -LB     STG 2 Progress Met  -LB     STG 3 Pt to perform standing balance activities on dynamic surfaces with CGA .  -LB     STG 3 Progress Met  -LB     Long Term Goals    LTG 1 Pt to ambulate up/down 6\" step modified independent level with use of assist device as needed.  -LB     LTG 1 Progress Met  -LB     LTG 2 Pt to demonstrate improved dynamic standing balance as demonstrated by improved WHITE balance score of 45/56 or better.   -LB     LTG 2 Progress Met  -LB     LTG 2 Progress Comments Pt scored a 49/56.  -LB     LTG 3 Pt to ambulate up/down 4 steps with modified independence and no LOB.   -LB     LTG 3 Progress Met  -LB     LTG 4 Pt to perform standing balance " acitivities on dynamic surfaces with SBA.   -LB     LTG 4 Progress Met  -LB     LTG 5 Pt to score a 18/24 on the Dynamci Gait index to reduce risk of falls.   -LB     LTG 5 Progress Met  -LB     LTG 5 Progress Comments Pt scored a 21/24.   -LB       User Key  (r) = Recorded By, (t) = Taken By, (c) = Cosigned By    Initials Name Provider Type    NGA Canales, PT Physical Therapist          Therapy Education  Education Details: Reviewed with pt the proper technique for floor transfers. Discussed her improvement on the FAGAN.  Given: Fall prevention and home safety, Mobility training  How Provided: Verbal  Provided to: Patient  Level of Understanding: Verbalized    Outcome Measure Options: Fagan Balance, Dynamic Gait Index  Fagan Balance Scale  Sitting to Standing: able to stand without using hands and stabilize independently  Standing Unsupported: able to stand safely for 2 minutes  Sitting with Back Unsupported but Feet Supported on Floor or on Stool: able to sit safely and securely for 2 minutes  Standing to Sitting: sits safely with minimal use of hands  Transfers: able to transfer safely with minor use of hands  Standing Unsupported with Eyes Closed: able to stand 10 seconds safely  Standing Unsupported with Feet Together: able to place feet together independently and stand 1 minute safely  Reaching Forward with Outstretched Arm While Standing: can reach forward confidently 25 cm (10 inches)   Object From the Floor From a Standing Position: able to  object safely and easily  Turning to Look Behind Over Left and Right Shoulders While Standing: looks behind from both sides and weight shifts well  Turn 360 Degrees: able to turn 360 degrees safely in 4 seconds or less  Place Alternate Foot on Step or Stool While Standing Unsupported: able to complete 4 steps without aid with supervision  Standing Unsupported with One Foot in Front: able to take small step independently and hold 30 seconds  Standing on  One Leg: tries to lift leg unable to hold 3 seconds but remains standing independently  Fagan Total Score: 49  Dynamic Gait Index (DGI)  Gait Level Surface: Normal: walks 20’, no assistive devices, good speed, no evidence for imbalance, normal gait pattern  Change in Gait Speed: Normal: Able to smoothly change walking speed without loss of balance or gait deviation. Shows significant difference in walking speeds between normal, fast and slow paces.  Gait with Horizontal Head Turns: Mild impairment: Performs head turns smoothly with slight change in gait velocity, i.e. minor disruption to smooth gait path or uses walking aid.  Gait with Vertical Head Turns: Mild impairment: Performs head turns smoothly with slight change in gait velocity, i.e. minor disruption to smooth gait path or uses walking aid.  Gait and Pivot Turn: Normal: Pivot turns safely within 3 seconds and stops quickly with no loss of balance.  Step Over Obstacle: Normal: Is able to step over box without changing gait speed, no evidence for imbalance.  Step Around Obstacles: Normal: Is able to walk safely around cones safely without changing gait speed, no evidence of imbalance.  Steps: Mild impairment: Alternating feet, must use rail.  Dynamic Gait Index Score: 21  Dynamic Gait Index Comments: no device     Time Calculation:   Start Time: 1315  Stop Time: 1359  Time Calculation (min): 44 min     Therapy Charges for Today     Code Description Service Date Service Provider Modifiers Qty    05529039690 HC PT MOBILITY CURRENT 12/22/2017 Deanna Canales, PT GP, CI 1    [id number] HC PT NEUROMUSC RE EDUCATION EA 15 MIN 12/22/2017 Deanna Canales, PT GP 3    [id number] HC PT MOBILITY DISCHARGE 12/22/2017 Deanna Canales, PT GP, CI 1          PT G-Codes  PT Professional Judgement Used?: Yes  Outcome Measure Options: Fagan Balance, Dynamic Gait Index  Functional Limitation: Mobility: Walking and moving around  Mobility: Walking and Moving Around Current Status  (): At least 1 percent but less than 20 percent impaired, limited or restricted  Mobility: Walking and Moving Around Goal Status (): At least 1 percent but less than 20 percent impaired, limited or restricted  Mobility: Walking and Moving Around Discharge Status (): At least 1 percent but less than 20 percent impaired, limited or restricted     OP PT Discharge Summary  Date of Discharge: 12/22/17  Reason for Discharge: All goals achieved        Deanna Canales, PT  12/22/2017

## 2017-12-26 ENCOUNTER — HOSPITAL ENCOUNTER (OUTPATIENT)
Dept: SPEECH THERAPY | Facility: HOSPITAL | Age: 57
Setting detail: THERAPIES SERIES
Discharge: HOME | End: 2017-12-26

## 2017-12-26 ENCOUNTER — APPOINTMENT (OUTPATIENT)
Dept: PHYSICAL THERAPY | Facility: HOSPITAL | Age: 57
End: 2017-12-26
Attending: PSYCHIATRY & NEUROLOGY

## 2017-12-26 DIAGNOSIS — I69.319 COGNITIVE DEFICIT FOLLOWING CEREBROVASCULAR ACCIDENT (CVA): Primary | ICD-10-CM

## 2017-12-26 DIAGNOSIS — R41.841 COGNITIVE COMMUNICATION DEFICIT: ICD-10-CM

## 2017-12-26 DIAGNOSIS — I67.850 CADASIL (CEREBRAL AD ARTERIOPATHY W INFARCTS AND LEUKOENCEPHALOPATHY): ICD-10-CM

## 2017-12-26 PROCEDURE — 97532: CPT

## 2017-12-26 NOTE — THERAPY TREATMENT NOTE
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   • Cervical radiculopathy   • Degeneration of intervertebral disc of cervical region   • Degeneration of intervertebral disc of lumbar region   • Depression   • Fracture of distal end of radius   • Static tremor   • Family history of colon cancer   • Gastroesophageal reflux disease   • Headache   • History of respiratory system disease   • History of total hysterectomy with bilateral salpingo-oophorectomy (BSO)   • Hypothyroidism   • Irritable bowel syndrome   • Adiposity   • Osteopenia   • Partial epilepsy with impairment of consciousness   • Encounter for preprocedural cardiovascular examination   • Simple renal cyst   • Lesion of vulva   • 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 Cognitive deficit following cerebrovascular accident (CVA) I69.319 438.0   2. CADASIL (cerebral AD arteriopathy w infarcts and leukoencephalopathy) I63.9 434.91    I67.89 323.81    G93.49 437.8   3. Cognitive communication deficit R41.841 799.52                               SLP OP Goals       17 1500       Subjective Comments    Subjective Comments Cooperative; Pt stated she enjoyed working w/ SLP and looking forward to progressing in tx.   SLP provided Stroke support group info & computer cog games  -SA     Subjective Pain    Able to rate subjective pain? yes  -SA     Pre-Treatment  Pain Level 0  -SA     Post-Treatment Pain Level 0  -SA     Verbal Expression Goals    Verbal Expression LTG's Patient will be able to use verbal expressive language skills to communicate effectively in social/avocational/work setting  -SA     Verbal Expression STG's Patient will improve verbal expressive language skills by expressing complex concepts and ideas;Patient will improve verbal expressive language skills by describing attributes, function, action and/or uses of an object/item;Patient will improve verbal expressive language skills by completing divergent naming tasks;Patient will improve verbal expressive language skills by describing single/multiple similarities and differences between two target items  -SA     Patient will improve verbal expressive language skills by describing single/multiple similarities and differences between two target items 90%:;without cues  -SA     Patient will improve verbal expressive language skills by completing divergent naming tasks 90%:;without cues  -SA     Status: Patient will improve verbal expressive language skills by completing divergent naming tasks Progressing as expected  -SA     Comments: Patient will improve verbal expressive language skills by completing divergent naming tasks Pt completed divergent naming tasks w/ 96% accuracy and divergent tasks naming which item does not belong w/ the other 4 and why w/ 72% accuracy; given extended time and w/ 18% phonemic cues   This is working on word retrieval skills  -SA     Memory Goals    Memory LTG's Patient will be able to remember information needed to participate in avocational activities  -SA     Patient will be able to remember information needed to participate in avocational activities Pt remembered to return homework.   -SA     Status: Patient will be able to remember information needed to participate in avocational activities Progressing as expected  -SA     Problem Solving Goals    Problem Solving LTG's  Patient will be able to execute all activities necessary to manage a home;Patient will be able to engage in avocational activities requiring high level cognitive skills  -SA     Patient will be able to execute all activities necessary to manage a home Independently  -SA     Status: Patient will be able to execute all activities necessary to manage a home Progressing as expected  -SA     Comments: Patient will be able to execute all activities necessary to manage a home Worked on organizing functional information re: checkbook Pt achieved 80% accuracy w/ minimal visual and verbal cues  -SA     Problem Solving STG's Patient will improve ability to analyze problems and determine solutions by completing a visual representation/filling in a chart by following  written directions;Patient will improve ability to analyze problems and determine solutions by completing functional math problems  -SA     Comments: Patient will improve ability to analyze problems and determine solutions by completing a visual representation/filling in a chart by following  written directions Pt completed deductive reasoning puzzle w/ 100% accuracy in 12 minutes per recent homework.   -SA     Patient will improve ability to analyze problems and determine solutions by completing functional math problems 90%:;without cues  -SA     Status: Patient will improve ability to analyze problems and determine solutions by completing functional math problems Progressing as expected  -SA     Comments: Patient will improve ability to analyze problems and determine solutions by completing functional math problems 80% on functional math re: checkbook registry and organizing information.  -SA     SLP Time Calculation    SLP Goal Re-Cert Due Date 02/21/18  -       User Key  (r) = Recorded By, (t) = Taken By, (c) = Cosigned By    Initials Name Provider Type    SA Anamaria Navarro MS CCC-SLP Speech and Language Pathologist                OP SLP Education        12/26/17 1617    Education    Barriers to Learning No barriers identified  -    Education Provided Family/caregivers demonstrated recommended strategies;Patient demonstrated recommended strategies;Patient participated in establishing goals and treatment plan  -    Assessed Learning needs;Learning motivation  -    Learning Motivation Strong  -    Learning Method Explanation;Written materials  -    Teaching Response Verbalized understanding  -      User Key  (r) = Recorded By, (t) = Taken By, (c) = Cosigned By    Initials Name Effective Dates     Anamaria Navarro MS CCC-SLP 07/25/17 -                      Time Calculation:   SLP Start Time: 1405  SLP Stop Time: 1505  SLP Time Calculation (min): 60 min    Therapy Charges for Today     Code Description Service Date Service Provider Modifiers Qty    74333718987 HC ST DEV OF COGN SKILLS EACH 15 MIN 12/26/2017 Anamaria Navarro MS CCC-SLP GN 4                   Anamaria Navarro MS CCC-SLP  12/26/2017

## 2017-12-29 ENCOUNTER — APPOINTMENT (OUTPATIENT)
Dept: PHYSICAL THERAPY | Facility: HOSPITAL | Age: 57
End: 2017-12-29
Attending: PSYCHIATRY & NEUROLOGY

## 2017-12-29 ENCOUNTER — HOSPITAL ENCOUNTER (OUTPATIENT)
Dept: SPEECH THERAPY | Facility: HOSPITAL | Age: 57
Setting detail: THERAPIES SERIES
Discharge: HOME | End: 2017-12-29

## 2018-01-02 ENCOUNTER — APPOINTMENT (OUTPATIENT)
Dept: PHYSICAL THERAPY | Facility: HOSPITAL | Age: 58
End: 2018-01-02
Attending: PSYCHIATRY & NEUROLOGY

## 2018-01-02 ENCOUNTER — HOSPITAL ENCOUNTER (OUTPATIENT)
Dept: SPEECH THERAPY | Facility: HOSPITAL | Age: 58
Setting detail: THERAPIES SERIES
Discharge: HOME | End: 2018-01-02

## 2018-01-02 DIAGNOSIS — I69.319 COGNITIVE DEFICIT FOLLOWING CEREBROVASCULAR ACCIDENT (CVA): Primary | ICD-10-CM

## 2018-01-02 DIAGNOSIS — R41.841 COGNITIVE COMMUNICATION DEFICIT: ICD-10-CM

## 2018-01-02 DIAGNOSIS — I67.850 CADASIL (CEREBRAL AD ARTERIOPATHY W INFARCTS AND LEUKOENCEPHALOPATHY): ICD-10-CM

## 2018-01-02 PROCEDURE — G0515 COGNITIVE SKILLS DEVELOPMENT: HCPCS

## 2018-01-03 NOTE — THERAPY TREATMENT NOTE
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   • Cervical radiculopathy   • Degeneration of intervertebral disc of cervical region   • Degeneration of intervertebral disc of lumbar region   • Depression   • Fracture of distal end of radius   • Static tremor   • Family history of colon cancer   • Gastroesophageal reflux disease   • Headache   • History of respiratory system disease   • History of total hysterectomy with bilateral salpingo-oophorectomy (BSO)   • Hypothyroidism   • Irritable bowel syndrome   • Adiposity   • Osteopenia   • Partial epilepsy with impairment of consciousness   • Encounter for preprocedural cardiovascular examination   • Simple renal cyst   • Lesion of vulva   • 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 Cognitive deficit following cerebrovascular accident (CVA) I69.319 438.0   2. CADASIL (cerebral AD arteriopathy w infarcts and leukoencephalopathy) I63.9 434.91    I67.89 323.81    G93.49 437.8   3. Cognitive communication deficit R41.841 799.52                               SLP OP Goals       18 1400       Subjective Comments    Subjective Comments Pt tearful today d/t worsening of word finding issues; pt not feeling well d/t viral infection. Pt needed MOD to MAX phonemic cues today. SLP reassured pt it was d/t illness and to rest more on days when she was ill.  -SA     Subjective Pain     Able to rate subjective pain? no  -SA     Pre-Treatment Pain Level 0  -SA     Post-Treatment Pain Level 0  -SA     Verbal Expression Goals    Status: Patient will improve verbal expressive language skills by describing single/multiple similarities and differences between two target items Progressing as expected  -SA     Comments: Patient will improve verbal expressive language skills by describing single/multiple similarities and differences between two target items pt stated similarities/differences b/w 2 objects using grammatically correct sentences w/ 75% accuracy w/ max phonemic cues.   -SA     Status: Patient will improve verbal expressive language skills by completing divergent naming tasks Progressing as expected  -SA     Comments: Patient will improve verbal expressive language skills by completing divergent naming tasks Pt worked on tfinding items belonging to same category- 100% accuracy; needed MOD phonemic cues for assistance  -SA     Auditory Comprehension Goals    Status: Patient will improve comprehension of spoken language by following Multi-step directions Progressing as expected  -SA     Comments: Patient will improve comprehension of spoken language by following Multi-step directions 88% accuracy on written 2-3 step commands w/o cues.Workbook for cog skills. Pt read commands to herself.  -SA     Memory Goals    Comments: Patient will be able to remember information needed to participate in avocational activities Visual Name picture association  WAL 10 for memory pg 88-89 w/ 40% accuracy.   -SA     Problem Solving Goals    Status: Patient will improve ability to analyze problems and determine solutions by completing a visual representation/filling in a chart by following  written directions Progressing as expected  -SA     Comments: Patient will improve ability to analyze problems and determine solutions by completing a visual representation/filling in a chart by following  written directions  Pt completed duductive reasoning puzzle w/ 100% accuracy w/o cues  -SA     Status: Patient will improve ability to analyze problems and determine solutions by completing functional math problems Progressing as expected  -SA     Comments: Patient will improve ability to analyze problems and determine solutions by completing functional math problems Worked on functional math word problem solving Set 3: w/ 90% accuracy and no cues.   -SA       User Key  (r) = Recorded By, (t) = Taken By, (c) = Cosigned By    Initials Name Provider Type    SA Anamaria Navarro MS CCC-SLP Speech and Language Pathologist                OP SLP Education       01/03/18 0825    Education    Barriers to Learning No barriers identified  -SA    Education Provided Patient requires further education on strategies, risks;Patient participated in establishing goals and treatment plan  -    Assessed Learning needs;Learning motivation  -    Learning Motivation Moderate;Other (comment)   pt discouraged d/t illness and regression of word finding skills  -    Learning Method Explanation;Written materials  -    Teaching Response Verbalized understanding;Reinforcement needed  -SA      User Key  (r) = Recorded By, (t) = Taken By, (c) = Cosigned By    Initials Name Effective Dates    SA Anamaria Navarro MS CCC-SLP 07/25/17 -                      Time Calculation:   SLP Start Time: 1405  SLP Stop Time: 1505  SLP Time Calculation (min): 60 min    Therapy Charges for Today     Code Description Service Date Service Provider Modifiers Qty    77922816962  ST DEV OF COGN SKILLS EACH 15 MIN 1/2/2018 Anamaria Navarro MS CCC-SLP  4                   Anamaria Navarro MS CCC-SLP  1/3/2018

## 2018-01-05 ENCOUNTER — HOSPITAL ENCOUNTER (OUTPATIENT)
Dept: SPEECH THERAPY | Facility: HOSPITAL | Age: 58
Setting detail: THERAPIES SERIES
Discharge: HOME | End: 2018-01-05

## 2018-01-05 ENCOUNTER — APPOINTMENT (OUTPATIENT)
Dept: PHYSICAL THERAPY | Facility: HOSPITAL | Age: 58
End: 2018-01-05
Attending: PSYCHIATRY & NEUROLOGY

## 2018-01-05 DIAGNOSIS — I67.850 CADASIL (CEREBRAL AD ARTERIOPATHY W INFARCTS AND LEUKOENCEPHALOPATHY): ICD-10-CM

## 2018-01-05 DIAGNOSIS — I69.319 COGNITIVE DEFICIT FOLLOWING CEREBROVASCULAR ACCIDENT (CVA): Primary | ICD-10-CM

## 2018-01-05 DIAGNOSIS — R41.841 COGNITIVE COMMUNICATION DEFICIT: ICD-10-CM

## 2018-01-05 PROCEDURE — G0515 COGNITIVE SKILLS DEVELOPMENT: HCPCS

## 2018-01-05 NOTE — THERAPY TREATMENT NOTE
Outpatient Speech Language Pathology   Adult Speech Language Cognitive Treatment Note  [facility]     Patient Name: Bhavana Teixeira  : 1960  MRN: [id number]  Today's Date: 2018      S/O: No complaints/feeling better today. Worked on various objectives.   A: Worked on verbal expression for word finding: determining definitions: Correctly chose 2/4 w/ 94% accuracy. Completed functional math worksheet w/ 100% accuracy; but became tearful during the middle of the worksheet. Pt stated she was having an anxiety attack. SLP comforted pt verbally by offering encouragement and having her complete diaphragmatic breathing. Pt stated it was due to the sheet being too hard. Worked on memory re: immediate visual recall of name/picture association w/ 100%; and 5 related words w/ 100% accuracy. SLP assisted pt w/ attempting to code words to assist w/ memory. Pt worked hard. Provided medication chart for assisting w/ visual cue to remember times/days to take certain medications as well as put a reminder in cell phone for an auditory/visual reminder. Sent multiple homework sheets home to practice.   P: Continue w/ cognitive therapy.     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   • Cervical radiculopathy   • Degeneration of intervertebral disc of cervical region   • Degeneration of intervertebral disc of lumbar region   • Depression   • Fracture of distal end of radius   • Static tremor   • Family history of colon cancer   • Gastroesophageal reflux disease   • Headache   • History of respiratory system disease   • History of total hysterectomy with bilateral salpingo-oophorectomy (BSO)   • Hypothyroidism   • Irritable bowel syndrome   • Adiposity   • Osteopenia   • Partial epilepsy with impairment of consciousness   • Encounter for  preprocedural cardiovascular examination   • Simple renal cyst   • Lesion of vulva   • 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 Cognitive deficit following cerebrovascular accident (CVA) I69.319 438.0   2. CADASIL (cerebral AD arteriopathy w infarcts and leukoencephalopathy) I63.9 434.91    I67.89 323.81    G93.49 437.8   3. Cognitive communication deficit R41.841 799.52                               SLP OP Goals       01/05/18 1700       Subjective Comments    Subjective Comments Feeling much better today; pt rested and completed a puzzle to   -     Subjective Pain    Able to rate subjective pain? yes  -SA     Pre-Treatment Pain Level 0  -SA     Post-Treatment Pain Level 0  -SA       User Key  (r) = Recorded By, (t) = Taken By, (c) = Cosigned By    Initials Name Provider Type     Anamaria Navarro MS CCC-SLP Speech and Language Pathologist                OP SLP Education       01/05/18 1752    Education    Barriers to Learning No barriers identified  -    Education Provided Patient requires further education on strategies, risks  -    Assessed Learning needs;Learning motivation  -    Learning Motivation Moderate  -    Learning Method Explanation;Demonstration;Written materials  -    Teaching Response Verbalized understanding;Reinforcement needed  -      User Key  (r) = Recorded By, (t) = Taken By, (c) = Cosigned By    Initials Name Effective Dates     Anamaria Navarro MS CCC-SLP 07/25/17 -                      Time Calculation:   SLP Start Time: 1400  SLP Stop Time: 1500  SLP Time Calculation (min): 60 min    Therapy Charges for Today     Code Description Service Date Service Provider Modifiers Qty    [id number]  ST DEV OF COGN SKILLS EACH 15 MIN 1/5/2018 Anamaria Navarro MS CCC-SLP  4                    Anamaria Navarro MS CCC-SLP  1/5/2018

## 2018-01-06 ENCOUNTER — DOCUMENTATION (OUTPATIENT)
Dept: NEUROLOGY | Facility: CLINIC | Age: 58
End: 2018-01-06

## 2018-01-06 ENCOUNTER — HOSPITAL ENCOUNTER (INPATIENT)
Facility: HOSPITAL | Age: 58
LOS: 3 days | Discharge: HOME | End: 2018-01-09
Attending: EMERGENCY MEDICINE | Admitting: HOSPITALIST

## 2018-01-06 ENCOUNTER — APPOINTMENT (OUTPATIENT)
Dept: CT IMAGING | Facility: HOSPITAL | Age: 58
End: 2018-01-06

## 2018-01-06 DIAGNOSIS — H53.2 DIPLOPIA: ICD-10-CM

## 2018-01-06 DIAGNOSIS — I63.9 CEREBROVASCULAR ACCIDENT (CVA), UNSPECIFIED MECHANISM (HCC): Primary | ICD-10-CM

## 2018-01-06 PROBLEM — E66.9 OBESITY (BMI 30-39.9): Status: ACTIVE | Noted: 2018-01-06

## 2018-01-06 LAB
ALBUMIN SERPL-MCNC: 3.9 G/DL (ref 3.5–5.2)
ALBUMIN/GLOB SERPL: 1.2 G/DL
ALP SERPL-CCNC: 151 U/L (ref 39–117)
ALT SERPL W P-5'-P-CCNC: 11 U/L (ref 1–33)
ANION GAP SERPL CALCULATED.3IONS-SCNC: 11.5 MMOL/L
AST SERPL-CCNC: 25 U/L (ref 1–32)
BASOPHILS # BLD AUTO: 0.02 10*3/MM3 (ref 0–0.2)
BASOPHILS NFR BLD AUTO: 0.3 % (ref 0–1.5)
BILIRUB SERPL-MCNC: 0.3 MG/DL (ref 0.1–1.2)
BUN BLD-MCNC: 18 MG/DL (ref 6–20)
BUN/CREAT SERPL: 18.8 (ref 7–25)
CALCIUM SPEC-SCNC: 8.6 MG/DL (ref 8.6–10.5)
CHLORIDE SERPL-SCNC: 108 MMOL/L (ref 98–107)
CO2 SERPL-SCNC: 24.5 MMOL/L (ref 22–29)
CREAT BLD-MCNC: 0.96 MG/DL (ref 0.57–1)
DEPRECATED RDW RBC AUTO: 44.1 FL (ref 37–54)
EOSINOPHIL # BLD AUTO: 0.17 10*3/MM3 (ref 0–0.7)
EOSINOPHIL NFR BLD AUTO: 2.9 % (ref 0.3–6.2)
ERYTHROCYTE [DISTWIDTH] IN BLOOD BY AUTOMATED COUNT: 14.1 % (ref 11.7–13)
GFR SERPL CREATININE-BSD FRML MDRD: 59 ML/MIN/1.73
GLOBULIN UR ELPH-MCNC: 3.3 GM/DL
GLUCOSE BLD-MCNC: 122 MG/DL (ref 65–99)
HCT VFR BLD AUTO: 42.9 % (ref 35.6–45.5)
HGB BLD-MCNC: 13.4 G/DL (ref 11.9–15.5)
IMM GRANULOCYTES # BLD: 0 10*3/MM3 (ref 0–0.03)
IMM GRANULOCYTES NFR BLD: 0 % (ref 0–0.5)
INR PPP: 1.05 (ref 0.9–1.1)
LYMPHOCYTES # BLD AUTO: 1.61 10*3/MM3 (ref 0.9–4.8)
LYMPHOCYTES NFR BLD AUTO: 27.7 % (ref 19.6–45.3)
MCH RBC QN AUTO: 26.8 PG (ref 26.9–32)
MCHC RBC AUTO-ENTMCNC: 31.2 G/DL (ref 32.4–36.3)
MCV RBC AUTO: 85.8 FL (ref 80.5–98.2)
MONOCYTES # BLD AUTO: 0.52 10*3/MM3 (ref 0.2–1.2)
MONOCYTES NFR BLD AUTO: 9 % (ref 5–12)
NEUTROPHILS # BLD AUTO: 3.49 10*3/MM3 (ref 1.9–8.1)
NEUTROPHILS NFR BLD AUTO: 60.1 % (ref 42.7–76)
PLATELET # BLD AUTO: 180 10*3/MM3 (ref 140–500)
PMV BLD AUTO: 10.9 FL (ref 6–12)
POTASSIUM BLD-SCNC: 4.4 MMOL/L (ref 3.5–5.2)
PROT SERPL-MCNC: 7.2 G/DL (ref 6–8.5)
PROTHROMBIN TIME: 13.3 SECONDS (ref 11.7–14.2)
RBC # BLD AUTO: 5 10*6/MM3 (ref 3.9–5.2)
SODIUM BLD-SCNC: 144 MMOL/L (ref 136–145)
WBC NRBC COR # BLD: 5.81 10*3/MM3 (ref 4.5–10.7)

## 2018-01-06 PROCEDURE — 85025 COMPLETE CBC W/AUTO DIFF WBC: CPT | Performed by: EMERGENCY MEDICINE

## 2018-01-06 PROCEDURE — 80053 COMPREHEN METABOLIC PANEL: CPT | Performed by: EMERGENCY MEDICINE

## 2018-01-06 PROCEDURE — 85610 PROTHROMBIN TIME: CPT | Performed by: EMERGENCY MEDICINE

## 2018-01-06 PROCEDURE — 70450 CT HEAD/BRAIN W/O DYE: CPT

## 2018-01-06 PROCEDURE — 99285 EMERGENCY DEPT VISIT HI MDM: CPT

## 2018-01-06 PROCEDURE — G0515 COGNITIVE SKILLS DEVELOPMENT: HCPCS

## 2018-01-06 RX ORDER — BISACODYL 10 MG
10 SUPPOSITORY, RECTAL RECTAL DAILY PRN
Status: DISCONTINUED | OUTPATIENT
Start: 2018-01-06 | End: 2018-01-09 | Stop reason: HOSPADM

## 2018-01-06 RX ORDER — LEVETIRACETAM 250 MG/1
250 TABLET ORAL EVERY 12 HOURS SCHEDULED
Status: DISCONTINUED | OUTPATIENT
Start: 2018-01-06 | End: 2018-01-09 | Stop reason: HOSPADM

## 2018-01-06 RX ORDER — ACETAMINOPHEN 650 MG/1
650 SUPPOSITORY RECTAL EVERY 4 HOURS PRN
Status: DISCONTINUED | OUTPATIENT
Start: 2018-01-06 | End: 2018-01-09 | Stop reason: HOSPADM

## 2018-01-06 RX ORDER — ASPIRIN 300 MG/1
300 SUPPOSITORY RECTAL DAILY
Status: DISCONTINUED | OUTPATIENT
Start: 2018-01-07 | End: 2018-01-09 | Stop reason: HOSPADM

## 2018-01-06 RX ORDER — UBIDECARENONE 100 MG
800 CAPSULE ORAL DAILY
COMMUNITY
End: 2019-02-26

## 2018-01-06 RX ORDER — GABAPENTIN 300 MG/1
600 CAPSULE ORAL EVERY 12 HOURS SCHEDULED
Status: DISCONTINUED | OUTPATIENT
Start: 2018-01-06 | End: 2018-01-09 | Stop reason: HOSPADM

## 2018-01-06 RX ORDER — DONEPEZIL HYDROCHLORIDE 10 MG/1
10 TABLET, FILM COATED ORAL NIGHTLY
Status: DISCONTINUED | OUTPATIENT
Start: 2018-01-06 | End: 2018-01-09 | Stop reason: HOSPADM

## 2018-01-06 RX ORDER — ONDANSETRON 2 MG/ML
4 INJECTION INTRAMUSCULAR; INTRAVENOUS EVERY 4 HOURS PRN
Status: DISCONTINUED | OUTPATIENT
Start: 2018-01-06 | End: 2018-01-09 | Stop reason: HOSPADM

## 2018-01-06 RX ORDER — FLUOXETINE HYDROCHLORIDE 20 MG/1
20 CAPSULE ORAL DAILY
Status: DISCONTINUED | OUTPATIENT
Start: 2018-01-07 | End: 2018-01-09 | Stop reason: HOSPADM

## 2018-01-06 RX ORDER — FLUTICASONE PROPIONATE 50 MCG
2 SPRAY, SUSPENSION (ML) NASAL DAILY
Status: DISCONTINUED | OUTPATIENT
Start: 2018-01-07 | End: 2018-01-09 | Stop reason: HOSPADM

## 2018-01-06 RX ORDER — LEVOTHYROXINE SODIUM 0.12 MG/1
125 TABLET ORAL
Status: DISCONTINUED | OUTPATIENT
Start: 2018-01-07 | End: 2018-01-09 | Stop reason: HOSPADM

## 2018-01-06 RX ORDER — MAGNESIUM OXIDE 400 MG/1
400 TABLET ORAL DAILY
Status: DISCONTINUED | OUTPATIENT
Start: 2018-01-07 | End: 2018-01-09 | Stop reason: HOSPADM

## 2018-01-06 RX ORDER — ZONISAMIDE 100 MG/1
300 CAPSULE ORAL NIGHTLY
Status: DISCONTINUED | OUTPATIENT
Start: 2018-01-06 | End: 2018-01-09 | Stop reason: HOSPADM

## 2018-01-06 RX ORDER — ATORVASTATIN CALCIUM 80 MG/1
80 TABLET, FILM COATED ORAL NIGHTLY
Status: DISCONTINUED | OUTPATIENT
Start: 2018-01-06 | End: 2018-01-09 | Stop reason: HOSPADM

## 2018-01-06 RX ORDER — MEMANTINE HYDROCHLORIDE 5 MG/1
5 TABLET ORAL EVERY 12 HOURS SCHEDULED
Status: DISCONTINUED | OUTPATIENT
Start: 2018-01-06 | End: 2018-01-09 | Stop reason: HOSPADM

## 2018-01-06 RX ORDER — SODIUM CHLORIDE 9 MG/ML
75 INJECTION, SOLUTION INTRAVENOUS CONTINUOUS
Status: DISCONTINUED | OUTPATIENT
Start: 2018-01-06 | End: 2018-01-09 | Stop reason: HOSPADM

## 2018-01-06 RX ORDER — ACETAMINOPHEN 325 MG/1
650 TABLET ORAL EVERY 4 HOURS PRN
Status: DISCONTINUED | OUTPATIENT
Start: 2018-01-06 | End: 2018-01-09 | Stop reason: HOSPADM

## 2018-01-06 RX ORDER — ASPIRIN 325 MG
325 TABLET ORAL DAILY
Status: DISCONTINUED | OUTPATIENT
Start: 2018-01-07 | End: 2018-01-09 | Stop reason: HOSPADM

## 2018-01-06 RX ORDER — ASPIRIN 300 MG/1
300 SUPPOSITORY RECTAL ONCE
Status: COMPLETED | OUTPATIENT
Start: 2018-01-06 | End: 2018-01-06

## 2018-01-06 RX ORDER — PANTOPRAZOLE SODIUM 40 MG/1
40 TABLET, DELAYED RELEASE ORAL EVERY MORNING
Status: DISCONTINUED | OUTPATIENT
Start: 2018-01-07 | End: 2018-01-09 | Stop reason: HOSPADM

## 2018-01-06 RX ADMIN — ZONISAMIDE 300 MG: 100 CAPSULE ORAL at 23:41

## 2018-01-06 RX ADMIN — SODIUM CHLORIDE 75 ML/HR: 9 INJECTION, SOLUTION INTRAVENOUS at 23:40

## 2018-01-06 RX ADMIN — ATORVASTATIN CALCIUM 80 MG: 80 TABLET, FILM COATED ORAL at 23:38

## 2018-01-06 RX ADMIN — GABAPENTIN 600 MG: 300 CAPSULE ORAL at 23:38

## 2018-01-06 RX ADMIN — ASPIRIN 300 MG: 300 SUPPOSITORY RECTAL at 16:29

## 2018-01-06 RX ADMIN — DONEPEZIL HYDROCHLORIDE 10 MG: 10 TABLET, FILM COATED ORAL at 23:40

## 2018-01-06 RX ADMIN — MEMANTINE HYDROCHLORIDE 5 MG: 5 TABLET, FILM COATED ORAL at 23:41

## 2018-01-06 RX ADMIN — LEVETIRACETAM 250 MG: 250 TABLET, FILM COATED ORAL at 23:39

## 2018-01-07 ENCOUNTER — APPOINTMENT (OUTPATIENT)
Dept: MRI IMAGING | Facility: HOSPITAL | Age: 58
End: 2018-01-07

## 2018-01-07 LAB
ALBUMIN SERPL-MCNC: 3.8 G/DL (ref 3.5–5.2)
ALBUMIN/GLOB SERPL: 1.1 G/DL
ALP SERPL-CCNC: 144 U/L (ref 39–117)
ALT SERPL W P-5'-P-CCNC: 7 U/L (ref 1–33)
ANION GAP SERPL CALCULATED.3IONS-SCNC: 12 MMOL/L
AST SERPL-CCNC: 18 U/L (ref 1–32)
BILIRUB SERPL-MCNC: 0.4 MG/DL (ref 0.1–1.2)
BUN BLD-MCNC: 15 MG/DL (ref 6–20)
BUN/CREAT SERPL: 14.6 (ref 7–25)
CALCIUM SPEC-SCNC: 8.8 MG/DL (ref 8.6–10.5)
CHLORIDE SERPL-SCNC: 109 MMOL/L (ref 98–107)
CHOLEST SERPL-MCNC: 114 MG/DL (ref 0–200)
CO2 SERPL-SCNC: 23 MMOL/L (ref 22–29)
CREAT BLD-MCNC: 1.03 MG/DL (ref 0.57–1)
CRP SERPL-MCNC: 0.35 MG/DL (ref 0–0.5)
DEPRECATED RDW RBC AUTO: 44.6 FL (ref 37–54)
ERYTHROCYTE [DISTWIDTH] IN BLOOD BY AUTOMATED COUNT: 14.3 % (ref 11.7–13)
ERYTHROCYTE [SEDIMENTATION RATE] IN BLOOD: 8 MM/HR (ref 0–30)
FIBRINOGEN PPP-MCNC: 335 MG/DL (ref 219–464)
GFR SERPL CREATININE-BSD FRML MDRD: 55 ML/MIN/1.73
GLOBULIN UR ELPH-MCNC: 3.5 GM/DL
GLUCOSE BLD-MCNC: 97 MG/DL (ref 65–99)
HBA1C MFR BLD: 5.6 % (ref 4.8–5.6)
HCT VFR BLD AUTO: 40.4 % (ref 35.6–45.5)
HDLC SERPL-MCNC: 60 MG/DL (ref 40–60)
HGB BLD-MCNC: 12.9 G/DL (ref 11.9–15.5)
LDLC SERPL CALC-MCNC: 39 MG/DL (ref 0–100)
LDLC/HDLC SERPL: 0.65 {RATIO}
MCH RBC QN AUTO: 27 PG (ref 26.9–32)
MCHC RBC AUTO-ENTMCNC: 31.9 G/DL (ref 32.4–36.3)
MCV RBC AUTO: 84.5 FL (ref 80.5–98.2)
PA ADP PRP-ACNC: 249 PRU (ref 194–418)
PLATELET # BLD AUTO: 191 10*3/MM3 (ref 140–500)
PMV BLD AUTO: 11.4 FL (ref 6–12)
POTASSIUM BLD-SCNC: 3.6 MMOL/L (ref 3.5–5.2)
PROT SERPL-MCNC: 7.3 G/DL (ref 6–8.5)
RBC # BLD AUTO: 4.78 10*6/MM3 (ref 3.9–5.2)
SODIUM BLD-SCNC: 144 MMOL/L (ref 136–145)
TRIGL SERPL-MCNC: 76 MG/DL (ref 0–150)
VLDLC SERPL-MCNC: 15.2 MG/DL (ref 5–40)
WBC NRBC COR # BLD: 6.38 10*3/MM3 (ref 4.5–10.7)

## 2018-01-07 PROCEDURE — 97162 PT EVAL MOD COMPLEX 30 MIN: CPT | Performed by: PHYSICAL THERAPIST

## 2018-01-07 PROCEDURE — 83036 HEMOGLOBIN GLYCOSYLATED A1C: CPT | Performed by: HOSPITALIST

## 2018-01-07 PROCEDURE — 93010 ELECTROCARDIOGRAM REPORT: CPT | Performed by: INTERNAL MEDICINE

## 2018-01-07 PROCEDURE — 80061 LIPID PANEL: CPT | Performed by: HOSPITALIST

## 2018-01-07 PROCEDURE — 25010000002 ONDANSETRON PER 1 MG: Performed by: HOSPITALIST

## 2018-01-07 PROCEDURE — 86140 C-REACTIVE PROTEIN: CPT | Performed by: RADIOLOGY

## 2018-01-07 PROCEDURE — 93005 ELECTROCARDIOGRAM TRACING: CPT | Performed by: RADIOLOGY

## 2018-01-07 PROCEDURE — A9577 INJ MULTIHANCE: HCPCS | Performed by: HOSPITALIST

## 2018-01-07 PROCEDURE — 85384 FIBRINOGEN ACTIVITY: CPT | Performed by: RADIOLOGY

## 2018-01-07 PROCEDURE — 85027 COMPLETE CBC AUTOMATED: CPT | Performed by: HOSPITALIST

## 2018-01-07 PROCEDURE — 83090 ASSAY OF HOMOCYSTEINE: CPT | Performed by: RADIOLOGY

## 2018-01-07 PROCEDURE — 0 GADOBENATE DIMEGLUMINE 529 MG/ML SOLUTION: Performed by: HOSPITALIST

## 2018-01-07 PROCEDURE — 92610 EVALUATE SWALLOWING FUNCTION: CPT

## 2018-01-07 PROCEDURE — 97116 GAIT TRAINING THERAPY: CPT | Performed by: PHYSICAL THERAPIST

## 2018-01-07 PROCEDURE — 80053 COMPREHEN METABOLIC PANEL: CPT | Performed by: HOSPITALIST

## 2018-01-07 PROCEDURE — 85576 BLOOD PLATELET AGGREGATION: CPT | Performed by: RADIOLOGY

## 2018-01-07 PROCEDURE — 70553 MRI BRAIN STEM W/O & W/DYE: CPT

## 2018-01-07 PROCEDURE — 85652 RBC SED RATE AUTOMATED: CPT | Performed by: RADIOLOGY

## 2018-01-07 PROCEDURE — 99223 1ST HOSP IP/OBS HIGH 75: CPT | Performed by: RADIOLOGY

## 2018-01-07 RX ORDER — SODIUM CHLORIDE 9 MG/ML
100 INJECTION, SOLUTION INTRAVENOUS CONTINUOUS
Status: DISCONTINUED | OUTPATIENT
Start: 2018-01-07 | End: 2018-01-09

## 2018-01-07 RX ADMIN — ASPIRIN 325 MG: 325 TABLET ORAL at 08:10

## 2018-01-07 RX ADMIN — ATORVASTATIN CALCIUM 80 MG: 80 TABLET, FILM COATED ORAL at 20:25

## 2018-01-07 RX ADMIN — LEVETIRACETAM 250 MG: 250 TABLET, FILM COATED ORAL at 08:10

## 2018-01-07 RX ADMIN — DONEPEZIL HYDROCHLORIDE 10 MG: 10 TABLET, FILM COATED ORAL at 20:24

## 2018-01-07 RX ADMIN — GABAPENTIN 600 MG: 300 CAPSULE ORAL at 08:10

## 2018-01-07 RX ADMIN — Medication 400 MG: at 08:10

## 2018-01-07 RX ADMIN — FLUTICASONE PROPIONATE 2 SPRAY: 50 SPRAY, METERED NASAL at 18:15

## 2018-01-07 RX ADMIN — PANTOPRAZOLE SODIUM 40 MG: 40 TABLET, DELAYED RELEASE ORAL at 08:10

## 2018-01-07 RX ADMIN — ACETAMINOPHEN 650 MG: 325 TABLET ORAL at 21:15

## 2018-01-07 RX ADMIN — ACETAMINOPHEN 650 MG: 325 TABLET ORAL at 16:30

## 2018-01-07 RX ADMIN — LEVOTHYROXINE SODIUM 125 MCG: 125 TABLET ORAL at 07:31

## 2018-01-07 RX ADMIN — ZONISAMIDE 300 MG: 100 CAPSULE ORAL at 20:24

## 2018-01-07 RX ADMIN — MEMANTINE HYDROCHLORIDE 5 MG: 5 TABLET, FILM COATED ORAL at 08:10

## 2018-01-07 RX ADMIN — GADOBENATE DIMEGLUMINE 20 ML: 529 INJECTION, SOLUTION INTRAVENOUS at 11:38

## 2018-01-07 RX ADMIN — SODIUM CHLORIDE 100 ML/HR: 9 INJECTION, SOLUTION INTRAVENOUS at 18:13

## 2018-01-07 RX ADMIN — Medication 1 TABLET: at 18:12

## 2018-01-07 RX ADMIN — FLUOXETINE HYDROCHLORIDE 20 MG: 20 CAPSULE ORAL at 08:10

## 2018-01-07 RX ADMIN — LEVETIRACETAM 250 MG: 250 TABLET, FILM COATED ORAL at 20:24

## 2018-01-07 RX ADMIN — ONDANSETRON 4 MG: 2 INJECTION INTRAMUSCULAR; INTRAVENOUS at 08:18

## 2018-01-07 RX ADMIN — MEMANTINE HYDROCHLORIDE 5 MG: 5 TABLET, FILM COATED ORAL at 20:24

## 2018-01-07 RX ADMIN — GABAPENTIN 600 MG: 300 CAPSULE ORAL at 20:24

## 2018-01-08 LAB — RPR SER QL: NORMAL

## 2018-01-08 PROCEDURE — 25010000002 ONDANSETRON PER 1 MG: Performed by: HOSPITALIST

## 2018-01-08 PROCEDURE — 86592 SYPHILIS TEST NON-TREP QUAL: CPT | Performed by: RADIOLOGY

## 2018-01-08 PROCEDURE — 99233 SBSQ HOSP IP/OBS HIGH 50: CPT | Performed by: NURSE PRACTITIONER

## 2018-01-08 PROCEDURE — 97166 OT EVAL MOD COMPLEX 45 MIN: CPT | Performed by: OCCUPATIONAL THERAPIST

## 2018-01-08 RX ADMIN — ACETAMINOPHEN 650 MG: 325 TABLET ORAL at 06:07

## 2018-01-08 RX ADMIN — GABAPENTIN 600 MG: 300 CAPSULE ORAL at 08:35

## 2018-01-08 RX ADMIN — Medication 1 TABLET: at 08:35

## 2018-01-08 RX ADMIN — ZONISAMIDE 300 MG: 100 CAPSULE ORAL at 19:38

## 2018-01-08 RX ADMIN — LEVOTHYROXINE SODIUM 125 MCG: 125 TABLET ORAL at 07:16

## 2018-01-08 RX ADMIN — ONDANSETRON 4 MG: 2 INJECTION INTRAMUSCULAR; INTRAVENOUS at 19:39

## 2018-01-08 RX ADMIN — ASPIRIN 325 MG: 325 TABLET ORAL at 08:35

## 2018-01-08 RX ADMIN — FLUOXETINE HYDROCHLORIDE 20 MG: 20 CAPSULE ORAL at 08:35

## 2018-01-08 RX ADMIN — Medication 400 MG: at 08:35

## 2018-01-08 RX ADMIN — DONEPEZIL HYDROCHLORIDE 10 MG: 10 TABLET, FILM COATED ORAL at 19:38

## 2018-01-08 RX ADMIN — MEMANTINE HYDROCHLORIDE 5 MG: 5 TABLET, FILM COATED ORAL at 19:38

## 2018-01-08 RX ADMIN — ATORVASTATIN CALCIUM 80 MG: 80 TABLET, FILM COATED ORAL at 19:38

## 2018-01-08 RX ADMIN — PANTOPRAZOLE SODIUM 40 MG: 40 TABLET, DELAYED RELEASE ORAL at 06:07

## 2018-01-08 RX ADMIN — FLUTICASONE PROPIONATE 2 SPRAY: 50 SPRAY, METERED NASAL at 08:35

## 2018-01-08 RX ADMIN — MEMANTINE HYDROCHLORIDE 5 MG: 5 TABLET, FILM COATED ORAL at 08:35

## 2018-01-08 RX ADMIN — LEVETIRACETAM 250 MG: 250 TABLET, FILM COATED ORAL at 08:35

## 2018-01-08 RX ADMIN — GABAPENTIN 600 MG: 300 CAPSULE ORAL at 19:39

## 2018-01-08 RX ADMIN — LEVETIRACETAM 250 MG: 250 TABLET, FILM COATED ORAL at 19:38

## 2018-01-09 ENCOUNTER — APPOINTMENT (OUTPATIENT)
Dept: SPEECH THERAPY | Facility: HOSPITAL | Age: 58
End: 2018-01-09

## 2018-01-09 VITALS
WEIGHT: 235 LBS | HEIGHT: 68 IN | BODY MASS INDEX: 35.61 KG/M2 | TEMPERATURE: 97.6 F | SYSTOLIC BLOOD PRESSURE: 159 MMHG | OXYGEN SATURATION: 94 % | RESPIRATION RATE: 18 BRPM | DIASTOLIC BLOOD PRESSURE: 93 MMHG | HEART RATE: 75 BPM

## 2018-01-09 LAB — HCYS SERPL-SCNC: 9.4 UMOL/L (ref 0–15)

## 2018-01-09 PROCEDURE — 97110 THERAPEUTIC EXERCISES: CPT

## 2018-01-09 RX ORDER — ATORVASTATIN CALCIUM 80 MG/1
80 TABLET, FILM COATED ORAL NIGHTLY
Qty: 30 TABLET | Refills: 0 | Status: SHIPPED | OUTPATIENT
Start: 2018-01-09 | End: 2019-02-26

## 2018-01-09 RX ORDER — ASPIRIN 325 MG
325 TABLET, DELAYED RELEASE (ENTERIC COATED) ORAL DAILY
Qty: 30 TABLET | Refills: 0 | Status: SHIPPED | OUTPATIENT
Start: 2018-01-09

## 2018-01-09 RX ORDER — LEVOTHYROXINE SODIUM 0.12 MG/1
125 TABLET ORAL DAILY
Qty: 30 TABLET | Refills: 0 | Status: SHIPPED | OUTPATIENT
Start: 2018-01-09 | End: 2019-02-26

## 2018-01-09 RX ADMIN — LEVETIRACETAM 250 MG: 250 TABLET, FILM COATED ORAL at 08:34

## 2018-01-09 RX ADMIN — Medication 400 MG: at 08:34

## 2018-01-09 RX ADMIN — ACETAMINOPHEN 650 MG: 325 TABLET ORAL at 12:53

## 2018-01-09 RX ADMIN — PANTOPRAZOLE SODIUM 40 MG: 40 TABLET, DELAYED RELEASE ORAL at 06:47

## 2018-01-09 RX ADMIN — GABAPENTIN 600 MG: 300 CAPSULE ORAL at 08:33

## 2018-01-09 RX ADMIN — ASPIRIN 325 MG: 325 TABLET ORAL at 08:34

## 2018-01-09 RX ADMIN — FLUTICASONE PROPIONATE 2 SPRAY: 50 SPRAY, METERED NASAL at 08:33

## 2018-01-09 RX ADMIN — MEMANTINE HYDROCHLORIDE 5 MG: 5 TABLET, FILM COATED ORAL at 08:34

## 2018-01-09 RX ADMIN — FLUOXETINE HYDROCHLORIDE 20 MG: 20 CAPSULE ORAL at 08:34

## 2018-01-09 RX ADMIN — Medication 1 TABLET: at 08:33

## 2018-01-09 RX ADMIN — LEVOTHYROXINE SODIUM 125 MCG: 125 TABLET ORAL at 06:47

## 2018-01-09 RX ADMIN — SODIUM CHLORIDE 100 ML/HR: 9 INJECTION, SOLUTION INTRAVENOUS at 08:33

## 2018-01-12 ENCOUNTER — APPOINTMENT (OUTPATIENT)
Dept: SPEECH THERAPY | Facility: HOSPITAL | Age: 58
End: 2018-01-12

## 2018-01-12 RX ORDER — MEMANTINE HYDROCHLORIDE 5 MG/1
TABLET ORAL
Qty: 180 TABLET | Refills: 2 | Status: SHIPPED | OUTPATIENT
Start: 2018-01-12 | End: 2018-08-03 | Stop reason: SDUPTHER

## 2018-01-16 ENCOUNTER — APPOINTMENT (OUTPATIENT)
Dept: SPEECH THERAPY | Facility: HOSPITAL | Age: 58
End: 2018-01-16

## 2018-01-18 ENCOUNTER — RESULTS ENCOUNTER (OUTPATIENT)
Dept: NEUROLOGY | Facility: HOSPITAL | Age: 58
End: 2018-01-18

## 2018-01-18 DIAGNOSIS — I63.9 CEREBROVASCULAR ACCIDENT (CVA), UNSPECIFIED MECHANISM (HCC): ICD-10-CM

## 2018-01-19 ENCOUNTER — APPOINTMENT (OUTPATIENT)
Dept: SPEECH THERAPY | Facility: HOSPITAL | Age: 58
End: 2018-01-19

## 2018-01-23 ENCOUNTER — APPOINTMENT (OUTPATIENT)
Dept: SPEECH THERAPY | Facility: HOSPITAL | Age: 58
End: 2018-01-23

## 2018-01-24 ENCOUNTER — APPOINTMENT (OUTPATIENT)
Dept: SPEECH THERAPY | Facility: HOSPITAL | Age: 58
End: 2018-01-24

## 2018-01-26 ENCOUNTER — APPOINTMENT (OUTPATIENT)
Dept: SPEECH THERAPY | Facility: HOSPITAL | Age: 58
End: 2018-01-26

## 2018-01-30 ENCOUNTER — APPOINTMENT (OUTPATIENT)
Dept: SPEECH THERAPY | Facility: HOSPITAL | Age: 58
End: 2018-01-30

## 2018-01-31 ENCOUNTER — APPOINTMENT (OUTPATIENT)
Dept: SPEECH THERAPY | Facility: HOSPITAL | Age: 58
End: 2018-01-31

## 2018-01-31 ENCOUNTER — DOCUMENTATION (OUTPATIENT)
Dept: SPEECH THERAPY | Facility: HOSPITAL | Age: 58
End: 2018-01-31

## 2018-01-31 DIAGNOSIS — I69.319 COGNITIVE DEFICIT FOLLOWING CEREBROVASCULAR ACCIDENT (CVA): Primary | ICD-10-CM

## 2018-01-31 DIAGNOSIS — I67.850 CADASIL (CEREBRAL AD ARTERIOPATHY W INFARCTS AND LEUKOENCEPHALOPATHY): ICD-10-CM

## 2018-01-31 DIAGNOSIS — R41.841 COGNITIVE COMMUNICATION DEFICIT: ICD-10-CM

## 2018-01-31 PROCEDURE — G9168 MEMORY CURRENT STATUS: HCPCS

## 2018-01-31 PROCEDURE — G9174 SPEECH LANG CURRENT STATUS: HCPCS

## 2018-01-31 PROCEDURE — G9175 SPEECH LANG GOAL STATUS: HCPCS

## 2018-01-31 PROCEDURE — G9159 LANG COMP CURRENT STATUS: HCPCS

## 2018-01-31 PROCEDURE — G9160 LANG COMP GOAL STATUS: HCPCS

## 2018-01-31 PROCEDURE — G9169 MEMORY GOAL STATUS: HCPCS

## 2018-01-31 PROCEDURE — G9161 LANG COMP D/C STATUS: HCPCS

## 2018-01-31 NOTE — THERAPY DISCHARGE NOTE
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   • Cervical radiculopathy   • Degeneration of intervertebral disc of cervical region   • Depression   • Fracture of distal end of radius   • Static tremor   • Family history of colon cancer   • Gastroesophageal reflux disease   • Headache   • History of respiratory system disease   • History of total hysterectomy with bilateral salpingo-oophorectomy (BSO)   • Hypothyroidism   • Irritable bowel syndrome   • Adiposity   • Osteopenia   • Partial epilepsy with impairment of consciousness   • Encounter for preprocedural cardiovascular examination   • Simple renal cyst   • Lesion of vulva   • Prolapse 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   • Cerebrovascular accident   • Seizure   • Hemispheric carotid artery syndrome   • Transient ischemic attack   • Hypernatremia   • Hyperglycemia   • DNR (do not resuscitate)   • Cerebrovascular accident (CVA)   • Obesity (BMI 30-39.9)          Visit Dx:    ICD-10-CM ICD-9-CM   1. Cognitive deficit following cerebrovascular accident (CVA) I69.319 438.0   2. CADASIL (cerebral AD arteriopathy w infarcts and leukoencephalopathy) I63.9 434.91    I67.89 323.81    G93.49 437.8   3. Cognitive communication deficit R41.841 799.52                             SLP OP Goals       18 1400       Subjective Comments    Subjective Comments Pt no longer able to attend speech tx at this center d/t insurance issues. Reportedly, she will get services that are with her insurance provider.   -SA     Verbal Expression Goals    Verbal Expression LTG's Patient will be able to  use verbal expressive language skills to communicate effectively in social/avocational/work setting  -SA     Patient will be able to use verbal expressive language skills to communicate effectively in social/avocational/work setting 90%:;without cues  -SA     Status: Patient will be able to use verbal expressive language skills to communicate effectively in social/avocational/work setting Progressing as expected  -SA     Comments: Patient will be able to use verbal expressive language skills to communicate effectively in social/avocational/work setting Pt was progressing as expected. During last visit, she has some mild anomia; however, able to use circumlocution and time delay to express herself adequately.   -SA     Verbal Expression STG's Patient will improve verbal expressive language skills by expressing complex concepts and ideas;Patient will improve verbal expressive language skills by describing attributes, function, action and/or uses of an object/item;Patient will improve verbal expressive language skills by completing divergent naming tasks;Patient will improve verbal expressive language skills by describing single/multiple similarities and differences between two target items  -SA     Patient will improve verbal expressive language skills by describing single/multiple similarities and differences between two target items 90%:;without cues  -SA     Status: Patient will improve verbal expressive language skills by describing single/multiple similarities and differences between two target items Progressing as expected  -SA     Comments: Patient will improve verbal expressive language skills by describing single/multiple similarities and differences between two target items per last data session, pt stated similarities/differences b/w 2 objects using grammatically correct sentences w/ 75% accuracy w/ max phonemic cues.   -SA     Patient will improve verbal expressive language skills by completing divergent  naming tasks 90%:;without cues  -SA     Status: Patient will improve verbal expressive language skills by completing divergent naming tasks Progressing as expected  -SA     Comments: Patient will improve verbal expressive language skills by completing divergent naming tasks Per last data session, pt worked on finding items belonging to same category- 100% accuracy; needed MOD phonemic cues for assistance  -SA     Patient will improve verbal expressive language skills by describing attributes, function, action and/or uses of an object/item 90%:;without cues  -SA     Status: Patient will improve verbal expressive language skills by describing attributes, function, action and/or uses of an object/item New  -SA     Comments: Patient will improve verbal expressive language skills by describing attributes, function, action and/or uses of an object/item This objective was not yet addressed  -SA     Patient will improve verbal expressive language skills by expressing complex concepts and ideas 90%:;without cues  -SA     Status: Patient will improve verbal expressive language skills by expressing complex concepts and ideas New  -SA     Comments: Patient will improve verbal expressive language skills by expressing complex concepts and ideas This objective was not yet addressed.   -SA     Auditory Comprehension Goals    Auditory Comprehension LTG's Patient will comprehend spoken information in all settings  -SA     Patient will comprehend spoken information in all settings 90%:;without cues  -SA     Status: Patient will comprehend spoken information in all settings Progressing as expected  -SA     Comments: Patient will comprehend spoken information in all settings Per communication; pt was able to understand all spoken information.   -SA     Auditory Comprehension STG's Patient will improve comprehension of spoken language by following Multi-step directions;Patient will demonstrate comprehension of spoken language by  answering questions about a multi paragraph length content  -SA     Patient will improve comprehension of spoken language by following Multi-step directions 90%:;without cues  -SA     Status: Patient will improve comprehension of spoken language by following Multi-step directions Progressing as expected  -SA     Comments: Patient will improve comprehension of spoken language by following Multi-step directions Per most recent data collection, 88% accuracy on written 2-3 step commands w/o cues.Workbook for cog skills. Pt read commands to herself.  -SA     Patient will demonstrate comprehension of spoken language by answering questions about a multi paragraph length content 90%:;without cues  -SA     Status: Patient will demonstrate comprehension of spoken language by answering questions about a multi paragraph length content Progressing as expected  -SA     Comments: Patient will demonstrate comprehension of spoken language by answering questions about a multi paragraph length content Per last data session, stories were read w/ questions about details of the story w/ 80% accuracy. During that session, pt became more impulsive towards the end of the task w/ decreased attention to detail in the stories.   -SA     Memory Goals    Memory LTG's Patient will be able to remember information needed to participate in avocational activities  -SA     Patient will be able to remember information needed to participate in avocational activities Pt remembered to return homework.   -SA     Status: Patient will be able to remember information needed to participate in avocational activities Progressing as expected  -SA     Comments: Patient will be able to remember information needed to participate in avocational activities Per last data session, pt was able to complete Visual Name picture association  WAL 10 for memory pg 88-89 w/ 40% accuracy.   -SA     Memory STG's Patient will demonstrate improved ability to recall information by  immediately recalling a series of words  -SA     Patient will demonstrate improved ability to recall information by immediately recalling a series of words 90%:;without cues  -SA     Status: Patient will demonstrate improved ability to recall information by immediately recalling a series of words Progressing as expected  -SA     Comments: Patient will demonstrate improved ability to recall information by immediately recalling a series of words Pt is progressing on this task and initially had immediate recall of mental manipulation task w/50% w/o cues and 75% w/ cues. During tx today she was able to immediately recall words w/ 88% accuracy w/ minimal verbal cues/repetition.   -SA     Problem Solving Goals    Problem Solving LTG's Patient will be able to execute all activities necessary to manage a home;Patient will be able to engage in avocational activities requiring high level cognitive skills  -SA     Patient will be able to execute all activities necessary to manage a home Independently  -SA     Status: Patient will be able to execute all activities necessary to manage a home Progressing as expected  -SA     Comments: Patient will be able to execute all activities necessary to manage a home Per last data session, pt is progressing and worked on organizing functional information re: checkbook Pt achieved 80% accuracy w/ minimal visual and verbal cues  -SA     Patient will be able to engage in avocational activities requiring high level cognitive skills Independently  -SA     Status: Patient will be able to engage in avocational activities requiring high level cognitive skills New  -SA     Comments: Patient will be able to engage in avocational activities requiring high level cognitive skills This objective was not addressed.   -SA     Problem Solving STG's Patient will improve ability to analyze problems and determine solutions by completing a visual representation/filling in a chart by following  written  directions;Patient will improve ability to analyze problems and determine solutions by completing functional math problems  -SA     Patient will improve ability to analyze problems and determine solutions by completing a visual representation/filling in a chart by following  written directions 90%:;without cues  -SA     Status: Patient will improve ability to analyze problems and determine solutions by completing a visual representation/filling in a chart by following  written directions Progressing as expected  -SA     Comments: Patient will improve ability to analyze problems and determine solutions by completing a visual representation/filling in a chart by following  written directions Pt progressing on this objective per last session, pt completed duductive reasoning puzzle w/ 100% accuracy w/o cues  -SA     Patient will improve ability to analyze problems and determine solutions by completing functional math problems 90%:;without cues  -SA     Status: Patient will improve ability to analyze problems and determine solutions by completing functional math problems Progressing as expected  -SA     Comments: Patient will improve ability to analyze problems and determine solutions by completing functional math problems Progressing on this objective; last session pt worked on functional math word problem solving Set 3: w/ 90% accuracy and no cues.   -SA     Patient will improve ability to analyze problems and determine solutions by completing high level math problems 90%:;without cues  -SA     Status: Patient will improve ability to analyze problems and determine solutions by completing high level math problems New  -SA     Comments: Patient will improve ability to analyze problems and determine solutions by completing high level math problems Not yet addressed  -SA       User Key  (r) = Recorded By, (t) = Taken By, (c) = Cosigned By    Initials Name Provider Type    SA Anamaria Navarro MS CCC-SLP Speech and Language  Pathologist                OP SLP Education       01/31/18 1442    Education    Barriers to Learning No barriers identified  -SA    Education Provided Patient requires further education on strategies, risks  -    Assessed Learning needs;Learning motivation  -    Learning Motivation Moderate  -SA    Learning Method Explanation;Written materials;Demonstration  -    Teaching Response Verbalized understanding;Reinforcement needed  -      User Key  (r) = Recorded By, (t) = Taken By, (c) = Cosigned By    Initials Name Effective Dates    SA Anamaria Navarro MS CCC-SLP 07/25/17 -                 OP SLP Assessment/Plan - 01/31/18 1439     SLP Assessment    Functional Problems Speech Language- Adult/Cognition  -SA    Impact on Function: Adult Speech Language/Cognition Restrictions in personal and social life;Difficulty following directions;Trouble learning or remembering new information  -    Clinical Impression: Speech Language-Adult/Congnition Mild:;Cognitive Communication Impairment  -    Please refer to paper survey for additional self-reported information Yes  -SA    Please refer to items scanned into chart for additional diagnostic informaiton and handouts as provided by clinician Yes  -SA    Prognosis Good (comment)  -SA    Patient/caregiver participated in establishment of treatment plan and goals Yes  -SA    Patient would benefit from skilled therapy intervention Yes  -SA    SLP Plan    Frequency Pt being d/c from therapy d/t insurance issues. She should continue speech services 2X per week  -    Duration 3 months  -SA    Planned CPT's? SLP INDIVIDUAL SPEECH THERAPY: 58470  -    Expected Duration Therapy Session (min) 45-60 minutes  -      User Key  (r) = Recorded By, (t) = Taken By, (c) = Cosigned By    Initials Name Provider Type    SA Anamaria Navarro MS CCC-SLP Speech and Language Pathologist             SLP Outcome Measures (last 72 hours)      SLP Outcome Measures       01/31/18 1400          SLP  Outcome Measures    Outcome Measure Used? Adult NOMS  -      FCM Scores    FCM Chosen Spoken Language Comprehension  -      Spoken Language Comprehension FCM Score 5  -SA      Memory FCM Score 4  -SA      Problem Solving FCM Score 4  -SA        User Key  (r) = Recorded By, (t) = Taken By, (c) = Cosigned By    Initials Name Effective Dates    SA Anamaria Navarro MS CCC-SLP 07/25/17 -                Time Calculation:            SLP G-Codes  Functional Limitations: Spoken language comprehension  Spoken Language Comprehension Current Status (): At least 20 percent but less than 40 percent impaired, limited or restricted  Spoken Language Comprehension Goal Status (): At least 1 percent but less than 20 percent impaired, limited or restricted  Spoken Language Comprehension Discharge Status (): At least 20 percent but less than 40 percent impaired, limited or restricted  Memory Current Status (): At least 40 percent but less than 60 percent impaired, limited or restricted  Memory Goal Status (): At least 1 percent but less than 20 percent impaired, limited or restricted  Other Speech-Language Pathology Functional Limitation Current Status (): At least 40 percent but less than 60 percent impaired, limited or restricted  Other Speech-Language Pathology Functional Limitation Goal Status (): At least 1 percent but less than 20 percent impaired, limited or restricted      OP SLP Discharge Summary  Date of Discharge: 01/31/18 (Pt's insurance is not covered at this facility.)  Reason for Discharge: other (comment)  Outcomes Achieved: Patient able to partially acheive established goals  Discharge Destination: Unknown      Anamaria Navarro MS CCC-SLP  1/31/2018

## 2018-02-02 ENCOUNTER — APPOINTMENT (OUTPATIENT)
Dept: SPEECH THERAPY | Facility: HOSPITAL | Age: 58
End: 2018-02-02

## 2018-02-06 ENCOUNTER — APPOINTMENT (OUTPATIENT)
Dept: SPEECH THERAPY | Facility: HOSPITAL | Age: 58
End: 2018-02-06

## 2018-02-07 ENCOUNTER — APPOINTMENT (OUTPATIENT)
Dept: SPEECH THERAPY | Facility: HOSPITAL | Age: 58
End: 2018-02-07

## 2018-02-09 ENCOUNTER — APPOINTMENT (OUTPATIENT)
Dept: SPEECH THERAPY | Facility: HOSPITAL | Age: 58
End: 2018-02-09

## 2018-02-13 ENCOUNTER — APPOINTMENT (OUTPATIENT)
Dept: SPEECH THERAPY | Facility: HOSPITAL | Age: 58
End: 2018-02-13

## 2018-02-14 ENCOUNTER — APPOINTMENT (OUTPATIENT)
Dept: SPEECH THERAPY | Facility: HOSPITAL | Age: 58
End: 2018-02-14

## 2018-02-15 ENCOUNTER — TELEPHONE (OUTPATIENT)
Dept: NEUROLOGY | Facility: CLINIC | Age: 58
End: 2018-02-15

## 2018-02-15 NOTE — TELEPHONE ENCOUNTER
I called and spoke with  And Mrs. Teixeira.  Bhavana Teixeira was very vague with answers but her spouse was at her side.  They said she is doing fairly well.  Her speech was halting at times, he said that is not a new thing.  She followed up with her PCP, Jocelyn Villagomez yesterday with no medication changes.  She remains on  mg ED daily and Lipitor 80 mg at night.  We went over signs and symptoms of stroke and to call 911 immediately.  mRS 3.  BAUTISTA Alexandre RN

## 2018-02-16 ENCOUNTER — APPOINTMENT (OUTPATIENT)
Dept: SPEECH THERAPY | Facility: HOSPITAL | Age: 58
End: 2018-02-16

## 2018-02-20 ENCOUNTER — APPOINTMENT (OUTPATIENT)
Dept: SPEECH THERAPY | Facility: HOSPITAL | Age: 58
End: 2018-02-20

## 2018-02-23 ENCOUNTER — APPOINTMENT (OUTPATIENT)
Dept: SPEECH THERAPY | Facility: HOSPITAL | Age: 58
End: 2018-02-23

## 2018-02-27 ENCOUNTER — APPOINTMENT (OUTPATIENT)
Dept: SPEECH THERAPY | Facility: HOSPITAL | Age: 58
End: 2018-02-27

## 2018-03-02 ENCOUNTER — APPOINTMENT (OUTPATIENT)
Dept: SPEECH THERAPY | Facility: HOSPITAL | Age: 58
End: 2018-03-02

## 2018-04-13 ENCOUNTER — OFFICE VISIT (OUTPATIENT)
Dept: NEUROLOGY | Facility: CLINIC | Age: 58
End: 2018-04-13

## 2018-04-13 ENCOUNTER — LAB (OUTPATIENT)
Dept: LAB | Facility: HOSPITAL | Age: 58
End: 2018-04-13

## 2018-04-13 VITALS
HEIGHT: 55 IN | BODY MASS INDEX: 52.99 KG/M2 | WEIGHT: 229 LBS | DIASTOLIC BLOOD PRESSURE: 78 MMHG | SYSTOLIC BLOOD PRESSURE: 130 MMHG | HEART RATE: 77 BPM | OXYGEN SATURATION: 99 %

## 2018-04-13 DIAGNOSIS — E78.5 HYPERLIPIDEMIA, UNSPECIFIED HYPERLIPIDEMIA TYPE: ICD-10-CM

## 2018-04-13 DIAGNOSIS — I67.850 CEREBRAL AUTOSOMAL DOMINANT ARTERIOPATHY WITH SUBCORTICAL INFARCTS AND LEUKOENCEPHALOPATHY: ICD-10-CM

## 2018-04-13 DIAGNOSIS — I10 HYPERTENSION, UNSPECIFIED TYPE: ICD-10-CM

## 2018-04-13 LAB — ASA PLATELET INHIBITION: 432 ARU

## 2018-04-13 PROCEDURE — 85576 BLOOD PLATELET AGGREGATION: CPT

## 2018-04-13 PROCEDURE — 36415 COLL VENOUS BLD VENIPUNCTURE: CPT

## 2018-04-13 PROCEDURE — 99213 OFFICE O/P EST LOW 20 MIN: CPT | Performed by: NURSE PRACTITIONER

## 2018-04-13 RX ORDER — TRIAMTERENE AND HYDROCHLOROTHIAZIDE 37.5; 25 MG/1; MG/1
1 TABLET ORAL
COMMUNITY
Start: 2018-03-08

## 2018-04-13 RX ORDER — MELOXICAM 15 MG/1
15 TABLET ORAL DAILY
COMMUNITY

## 2018-04-13 NOTE — PROGRESS NOTES
DOS: 2018  NAME: Bhavana Teixeira   : 1960  PCP: Jocelyn Villagomez MD    Chief Complaint   Patient presents with   • Cerebrovascular Accident        Neurological Problem and Interval History:  57 y.o. RHW female with HTN, HLD, migraine, pre DM, seizure d.o., termor, memory loss and CADASIL. She presents today for 3 month stroke follow up.     She denies any new S/S of stroke. The past few days she has had some unexplained H/As right across her forehead, dull pain. These usually come on as the day goes on and makes it hard to go to bed at night but does not wake her up at night. She has not taken anything for them. She continues to have some weakness on the right and vision trouble. She see's double still, a lot, a but she can't give details. She is seeing an eye specialist. She is not in PT or OT because she is in eye therapy. She has fallen twice due to loss of balance. She does most ADLs on her own, dressing herself and bathing herself. She does not do chores. Her  feels that her memory and personality are the same since this stroke. She is tolerating her ASA 325mg. No seizures. She is not sure why she is on Neurontin. She is still on Keppra. Her PCP manages her Namenda and Aricept. Her BP was high but she is on a new medication and her BP has improved, 180s to now 130s.     2018 she presented to [facility] after waking up feeling dizzy and noted double vision. The double vision was horizontal and worse on looking to the left. One month prior she had a TIA which caused trouble coming up with words and feeling off balance.  The speech improved but the balance issue persisted.  In 2017 she was seen in the hospital by Dr. Kendall Boyd for right-sided weakness. The MRI was negative for new stroke at that time. She was on aspirin at the time and he switched her to Plavix. In January she was Dx with new strokes-right SAGRARIO and one in the brainstem, on Plavix, and lab results revealed she did not respond to  "the Plavix. She was put on full dose ASA and her Lipitor 20mg was increased to 80mg. These strokes occured in the setting of a diagnosis of CADASIL. She was D/C home and did some PT/OT. Now mainly focusing on eye therapy.    She ha a family history of CADASIL with 4 out of 5 children manifesting the disease along with her mother.  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     Review of Systems:        Review of Systems   Constitutional: Positive for chills and diaphoresis (night).   HENT: Positive for drooling. Negative for tinnitus and trouble swallowing.    Eyes: Positive for photophobia, pain and visual disturbance.   Respiratory: Positive for apnea. Negative for shortness of breath and wheezing.    Cardiovascular: Negative for chest pain, palpitations and leg swelling.   Gastrointestinal: Negative for blood in stool, constipation, diarrhea, nausea and vomiting.   Endocrine: Negative for cold intolerance, heat intolerance and polydipsia.   Genitourinary: Positive for urgency. Negative for decreased urine volume, difficulty urinating and dyspareunia.   Musculoskeletal: Positive for gait problem (uses a walker) and myalgias. Negative for neck pain and neck stiffness.   Skin: Negative for color change, rash and wound.   Allergic/Immunologic: Negative for environmental allergies, food allergies and immunocompromised state.   Neurological: Positive for numbness and headaches. Negative for dizziness, tremors, seizures, syncope, facial asymmetry, speech difficulty, weakness and light-headedness.   Hematological: Negative for adenopathy. Does not bruise/bleed easily.   Psychiatric/Behavioral: Negative for agitation, behavioral problems, confusion, decreased concentration, dysphoric mood, hallucinations, self-injury, sleep disturbance and suicidal ideas. The patient is not nervous/anxious and is not hyperactive.        \"The following portions of " "the patient's history were reviewed and updated as appropriate: allergies, current medications, past family history, past medical history, past social history, past surgical history and problem list.\"      Laboratory Results:             Lab Results   Component Value Date    HGBA1C 5.60 01/07/2018         Lab Results   Component Value Date    CHOL 114 01/07/2018    CHOL 152 11/08/2017         Lab Results   Component Value Date    HDL 60 01/07/2018    HDL 58 11/08/2017         Lab Results   Component Value Date    LDL 39 01/07/2018    LDL 77 11/08/2017         Lab Results   Component Value Date    TRIG 76 01/07/2018    TRIG 84 11/08/2017     Lab Results   Component Value Date    RPR Non-Reactive 01/08/2018     Lab Results   Component Value Date    TSH 1.600 11/08/2017     No results found for: [id number]    Physical Examination:  mRS: 3  General Appearance:   Well developed, obese, disheveled, alert, and cooperative.  HEENT: Normocephalic.   Neck and Spine: No bruits.  Cardiac: Regular rate and rhythm. No murmurs.  Extremities:    No edema or deformities    Neurological examination:  Higher Integrative  Function: Oriented to April 2008, trump, place and person. Normal registration, attention span and concentration. Normal comprehension, some word searching and incorrect pronunciation with spontaneous speech, normal repetition, hesitation and word searching with reading, naming and vocabulary. No neglect with normal visual-spatial function and construction. Fair fund of knowledge and higher integrative function.  CN II: Normal visual acuity and visual fields.    CN III IV VI: Slightly decreased left lateral gaze with the right eye, improving CN III palsy   CN V: Normal facial sensation and strength of muscles of mastication.  CN VII: Facial movements are symmetric. No weakness.  CN VIII:   Auditory acuity is normal.  CN IX & X:   Symmetric palatal movement.  CN XI: Sternocleidomastoid and trapezius are normal.  No " weakness.  CN XII:   The tongue is midline.  No atrophy or fasciculations.  Motor: Normal muscle strength, bulk and tone in upper and lower extremities except for 4/5 right hand weakness. Subtle termor of hands  Sensation: Normal to light touch, temperature, and proprioception in arms and legs. Normal graphesthesia and no extinction on DSS.  Station and Gait: Unsteady, broad based     Coordination: Rapid alternating movements are normal.  Heel to shin normal.    Diagnoses / Discussion:  Ms. Teixeira is a 58yo who presents for her 3 month right SAGRARIO and brainstem infarct. The etiology is CADASIL. She was on plavix at the time and now on ASA 325mg. I will check an ASA response as it was recommended 7 days after D/C from the hospital in January. Antiplatelet therapy is the mainstay for stroke prevention but has not been proven highly effective. Given the extent of the GRE abnormalities however, any further escalation of therapy will put her at very high risk of intracerebral hemorrhage. She needs continued aggressive risk factor control. Her LDL was 39 at the time of the stroke on Lipitor 20mg. I recommend she decrease her Lipitor dose from 80mg currently back to 20mg for goal LDL 40-70. 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 If she has continued balance trouble and right side weakness I recommend PT and OT. She will need to FU with Dr. Boyd for management of her seizures, medications, tremor and dementia. She and her  agree with the plan and will call with any questions or concerns.     Plan:   Check ASA response    Blood pressure control to <130/80   Goal LDL <70-recommend high dose statins-    Serum glucose < 140   Call 911 for stroke any stroke symptoms   Follow-up with Dr. Boyd   Bhavana was seen today for cerebrovascular accident.    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

## 2018-04-16 ENCOUNTER — TELEPHONE (OUTPATIENT)
Dept: NEUROLOGY | Facility: CLINIC | Age: 58
End: 2018-04-16

## 2018-04-16 NOTE — TELEPHONE ENCOUNTER
I called and spoke with Ms. Teixeira.  I let her know she is responding well to the  mg and should continue this dosage.  She verbalized understanding.  BAUTISTA Alexandre RN

## 2018-04-16 NOTE — TELEPHONE ENCOUNTER
----- Message from KEMAL Ta sent at 4/13/2018  3:19 PM EDT -----  Let her know she is responding to her ASA 325mg, continue

## 2018-08-03 ENCOUNTER — OFFICE VISIT (OUTPATIENT)
Dept: NEUROLOGY | Facility: CLINIC | Age: 58
End: 2018-08-03

## 2018-08-03 VITALS
HEIGHT: 68 IN | HEART RATE: 71 BPM | DIASTOLIC BLOOD PRESSURE: 68 MMHG | WEIGHT: 228 LBS | BODY MASS INDEX: 34.56 KG/M2 | SYSTOLIC BLOOD PRESSURE: 126 MMHG | OXYGEN SATURATION: 96 %

## 2018-08-03 DIAGNOSIS — G40.209 PARTIAL EPILEPSY WITH IMPAIRMENT OF CONSCIOUSNESS (HCC): ICD-10-CM

## 2018-08-03 DIAGNOSIS — F01.50 VASCULAR DEMENTIA WITHOUT BEHAVIORAL DISTURBANCE (HCC): Primary | ICD-10-CM

## 2018-08-03 PROBLEM — H49.01 PARTIAL RIGHT THIRD NERVE PALSY: Status: ACTIVE | Noted: 2018-01-26

## 2018-08-03 PROCEDURE — 99215 OFFICE O/P EST HI 40 MIN: CPT | Performed by: PSYCHIATRY & NEUROLOGY

## 2018-08-03 RX ORDER — MEMANTINE HYDROCHLORIDE 5 MG/1
5 TABLET ORAL 2 TIMES DAILY
Qty: 180 TABLET | Refills: 3 | Status: SHIPPED | OUTPATIENT
Start: 2018-08-03

## 2018-08-03 RX ORDER — GABAPENTIN 600 MG/1
600 TABLET ORAL 2 TIMES DAILY
Qty: 180 TABLET | Refills: 3 | Status: SHIPPED | OUTPATIENT
Start: 2018-08-03 | End: 2019-02-26

## 2018-08-03 RX ORDER — DONEPEZIL HYDROCHLORIDE 10 MG/1
10 TABLET, FILM COATED ORAL NIGHTLY
Qty: 90 TABLET | Refills: 3 | Status: SHIPPED | OUTPATIENT
Start: 2018-08-03

## 2018-08-03 RX ORDER — LEVETIRACETAM 500 MG/1
250 TABLET ORAL EVERY 12 HOURS SCHEDULED
Qty: 90 TABLET | Refills: 3 | Status: SHIPPED | OUTPATIENT
Start: 2018-08-03

## 2018-08-03 RX ORDER — ZONISAMIDE 100 MG/1
300 CAPSULE ORAL NIGHTLY
Qty: 270 CAPSULE | Refills: 3 | Status: SHIPPED | OUTPATIENT
Start: 2018-08-03

## 2018-08-03 NOTE — PROGRESS NOTES
CC: CADASIL; complex partial seizures    HPI:  Bhavana Teixeira is a  58 y.o.  right-handed white female who I am seeing in follow-up with CADASIL and complex partial seizures.     She had previously been evaluated and treated by Dr. Keith Brown in the [facility].  She had MRI scans and also indicates she had blood work for CADASIL which appears to predate lab tests availability through our system.  Dr. Brown movement to [place] and she was seen by Dr. Toth whom she did not like and she was referred to me.  She has strong family history of CADASIL in 4 of 5 siblings in her magda.  Her mother had this disorder.  Her mother was oldest in her magda but her father was a different father from the other 10 or 12 siblings.  The patient does not know if any of her siblings had CADASIL. The patient does not smoke and she denies hypertension    In January she had acute onset of dizziness and diplopia.  Her MRI of the brain showed 1.  4 mm periventricular acute infarct involving the periventricular white matter of the right parietal lobe anteriorly just superior to the right lateral ventricle. 2. Findings consistent with the patient's diagnosis of CADASIL including innumerable small foci of signal loss suggesting hemosiderin depositionboth supratentorially and infratentorially with confluent areas of increased signal intensity involving the white matter of the cerebral hemispheres on the T2 FLAIR sequence, unchanged.  Her glasses have prisms which clearly helps the diplopia.  She has been going through eye therapy.  She was discharged on aspirin 325 mg since the Plavix was not effective.  A follow-up platelet aggregation study demonstrated aspirin was effective at inhibiting platelets.  She is now on Lipitor 20 mg daily.  Her LDL had dropped below 40    She has had no seizures since that hospitalization.  She is on gabapentin 600 mg twice daily, Keppra 250 mg twice daily and Zonegran 300 mg at night.  She is also  on donepezil 10 mg nightly and Namenda 5 mg twice daily.       Past Medical History:   Diagnosis Date   • Arthritis    • Cervical spine disease    • Degenerative joint disease (DJD) of lumbar spine    • Depression    • Migraine    • Seasonal allergies    • Seizures (CMS/HCC)    • Sleep apnea    • Thyroid disease          Past Surgical History:   Procedure Laterality Date   • BACK SURGERY     • CHOLECYSTECTOMY     • HYSTERECTOMY             Current Outpatient Prescriptions:   •  aspirin  MG tablet, Take 1 tablet by mouth Daily., Disp: 30 tablet, Rfl: 0  •  atorvastatin (LIPITOR) 80 MG tablet, Take 1 tablet by mouth Every Night. (Patient taking differently: Take 20 mg by mouth Every Night.), Disp: 30 tablet, Rfl: 0  •  coenzyme Q10 100 MG capsule, Take 800 mg by mouth Daily., Disp: , Rfl:   •  conjugated estrogens (PREMARIN) 0.625 MG/GM vaginal cream, Insert 0.25 applicators into the vagina 1 (One) Time Per Week., Disp: , Rfl:   •  donepezil (ARICEPT) 10 MG tablet, Take 1 tablet by mouth Every Night., Disp: 90 tablet, Rfl: 3  •  FLUoxetine (PROzac) 20 MG capsule, Take 20 mg by mouth Daily., Disp: , Rfl:   •  gabapentin (NEURONTIN) 600 MG tablet, Take 1 tablet by mouth 2 (Two) Times a Day., Disp: 180 tablet, Rfl: 3  •  lansoprazole (PREVACID) 30 MG capsule, Take 30 mg by mouth Daily., Disp: , Rfl:   •  levETIRAcetam (KEPPRA) 500 MG tablet, Take 0.5 tablets by mouth Every 12 (Twelve) Hours., Disp: 90 tablet, Rfl: 3  •  levothyroxine (SYNTHROID, LEVOTHROID) 125 MCG tablet, Take 1 tablet by mouth Daily., Disp: 30 tablet, Rfl: 0  •  magnesium oxide (MAG-OX) 400 MG tablet, Take 400 mg by mouth Daily., Disp: , Rfl:   •  meloxicam (MOBIC) 15 MG tablet, Take 15 mg by mouth Daily., Disp: , Rfl:   •  memantine (NAMENDA) 5 MG tablet, Take 1 tablet by mouth 2 (Two) Times a Day., Disp: 180 tablet, Rfl: 3  •  Mirabegron ER (MYRBETRIQ) 50 MG tablet sustained-release 24 hour 24 hr tablet, Take 50 mg by mouth Daily., Disp: , Rfl:    •  Multiple Vitamins-Minerals (MULTIVITAMIN & MINERAL PO), Take 1 tablet by mouth Daily., Disp: , Rfl:   •  triamterene-hydrochlorothiazide (MAXZIDE-25) 37.5-25 MG per tablet, Take 1 tablet by mouth., Disp: , Rfl:   •  zonisamide (ZONEGRAN) 100 MG capsule, Take 3 capsules by mouth Every Night., Disp: 270 capsule, Rfl: 3  •  acetaminophen (TYLENOL) 325 MG tablet, Take 2 tablets by mouth Every 4 (Four) Hours As Needed for Mild Pain  or Fever (Temperature greater than or equal to 37 C)., Disp: , Rfl:   •  cyanocobalamin 1000 MCG/ML injection, Inject 1,000 mcg into the shoulder, thigh, or buttocks Every 28 (Twenty-Eight) Days., Disp: , Rfl:   •  fluticasone (FLONASE) 50 MCG/ACT nasal spray, 2 sprays into each nostril Daily., Disp: , Rfl:   •  levothyroxine (SYNTHROID, LEVOTHROID) 125 MCG tablet, Take 125 mcg by mouth Daily., Disp: , Rfl:       Family History   Problem Relation Age of Onset   • Migraines Mother    • Migraines Father    • Multiple sclerosis Father    • Migraines Sister          Social History     Social History   • Marital status:      Spouse name: N/A   • Number of children: N/A   • Years of education: N/A     Occupational History   • Not on file.     Social History Main Topics   • Smoking status: Never Smoker   • Smokeless tobacco: Never Used   • Alcohol use Yes      Comment: social    • Drug use: No   • Sexual activity: No     Other Topics Concern   • Not on file     Social History Narrative   • No narrative on file         Allergies   Allergen Reactions   • Amlodipine Swelling   • Amlodipine Besylate Swelling   • Penicillins Hives   • Lisinopril Other (See Comments)     COUGH   • Metoclopramide Other (See Comments)     SLEEPY   • Oxycodone Nausea Only         Pain Scale: 3/10, mostly back pain        ROS:  Review of Systems   Constitutional: Positive for fatigue. Negative for chills and fever.   HENT: Positive for tinnitus and trouble swallowing (drinking water). Negative for hearing loss.   "  Eyes: Positive for visual disturbance. Negative for pain, redness and itching.   Respiratory: Negative for cough, shortness of breath and wheezing.    Cardiovascular: Negative for chest pain, palpitations and leg swelling.   Gastrointestinal: Positive for constipation and diarrhea. Negative for nausea and vomiting.   Endocrine: Negative for cold intolerance, heat intolerance and polydipsia.   Genitourinary: Negative for decreased urine volume, difficulty urinating, frequency and urgency.   Musculoskeletal: Positive for back pain, gait problem and neck pain. Negative for joint swelling and neck stiffness.   Skin: Negative for color change, rash and wound.   Allergic/Immunologic: Negative for environmental allergies, food allergies and immunocompromised state.   Neurological: Positive for dizziness, tremors, seizures (not in a while), weakness, numbness and headaches. Negative for syncope, facial asymmetry, speech difficulty and light-headedness.   Hematological: Negative for adenopathy. Does not bruise/bleed easily.   Psychiatric/Behavioral: Positive for confusion. Negative for agitation, behavioral problems, decreased concentration, dysphoric mood, hallucinations, self-injury, sleep disturbance and suicidal ideas. The patient is nervous/anxious. The patient is not hyperactive.            Physical Exam:  Vitals:    08/03/18 1342   BP: 126/68   Pulse: 71   SpO2: 96%   Weight: 103 kg (228 lb)   Height: 172.7 cm (68\")     Body mass index is 34.67 kg/m².    Physical Exam  Gen.: Obese white female no acute distress  HEENT: Normocephalic no evidence of trauma.  Discs flat.  No A-V nicking.  Throat negative.  Neck: Supple.  No thyromegaly.  No cervical bruits.  Radial pulses strong and simultaneous  Heart: Regular rate and rhythm without murmurs      Neurological Exam:   Mental status: The patient is awake and alert.  She has some short-term memory problems.  No evidence of an affective disorder thought disorder delusions " or hallucinations.  HCF: No aphasia.  No clear-cut apraxia.  No dysarthria.  Some short-term memory problems.  Knowledge of recent events intact.  Cranial nerves: 2-12 intact except there is horizontal diplopia in primary gaze worse on lateral gaze either side with glasses off but this is corrected with glasses on.  No nystagmus.   Motor: Normal tone and bulk.  No focal weakness  Reflexes symmetric  Sensory: Light touch and pinprick are diffusely intact  Cerebellar: Finger to nose rapid movements little slower with the right hand.  Gait and Station casual walk is slow broad-based and mildly unsteady.  She ordinarily uses her rolling walker.  I did not stress her further        Results:      Lab Results   Component Value Date    GLUCOSE 97 01/07/2018    BUN 15 01/07/2018    CREATININE 1.03 (H) 01/07/2018    EGFRIFNONA 55 (L) 01/07/2018    BCR 14.6 01/07/2018    CO2 23.0 01/07/2018    CALCIUM 8.8 01/07/2018    ALBUMIN 3.80 01/07/2018    AST 18 01/07/2018    ALT 7 01/07/2018       Lab Results   Component Value Date    WBC 6.38 01/07/2018    HGB 12.9 01/07/2018    HCT 40.4 01/07/2018    MCV 84.5 01/07/2018     01/07/2018         .  Lab Results   Component Value Date    RPR Non-Reactive 01/08/2018         Lab Results   Component Value Date    TSH 1.600 11/08/2017         No results found for: [id number]      No results found for: FOLATE      Lab Results   Component Value Date    HGBA1C 5.60 01/07/2018       MRI brain 1/17/18 reviewed as noted above        Assessment:   1.  CADASIL-no recent TIA or stroke symptoms after hospitalization in January.  2.  Complex partial seizure-controlled  3.  Peripheral neuropathy          Plan:  1.  Continue aspirin and Lipitor  2.  Continue Keppra, gabapentin and Zonagran  3.  Continue Aricept and memantine  4.  Continue risk factor control as possible.  5.  Follow-up in 6 months with Lynn Harrington NP          At least 20 minutes of this 40 minute follow-up were spent counseling  the patient and  regarding her complicated predominantly neurovascular situation.                Dictated utilizing Dragon dictation.

## 2019-02-26 ENCOUNTER — TELEPHONE (OUTPATIENT)
Dept: NEUROLOGY | Facility: CLINIC | Age: 59
End: 2019-02-26

## 2019-02-26 ENCOUNTER — OFFICE VISIT (OUTPATIENT)
Dept: NEUROLOGY | Facility: CLINIC | Age: 59
End: 2019-02-26

## 2019-02-26 VITALS — BODY MASS INDEX: 37.13 KG/M2 | HEART RATE: 70 BPM | WEIGHT: 245 LBS | OXYGEN SATURATION: 92 % | HEIGHT: 68 IN

## 2019-02-26 DIAGNOSIS — G40.209 PARTIAL EPILEPSY WITH IMPAIRMENT OF CONSCIOUSNESS (HCC): ICD-10-CM

## 2019-02-26 DIAGNOSIS — I67.850 CADASIL (CEREBRAL AD ARTERIOPATHY W INFARCTS AND LEUKOENCEPHALOPATHY): Primary | ICD-10-CM

## 2019-02-26 DIAGNOSIS — G47.30 SLEEP APNEA, UNSPECIFIED TYPE: ICD-10-CM

## 2019-02-26 DIAGNOSIS — E66.9 OBESITY (BMI 30-39.9): ICD-10-CM

## 2019-02-26 DIAGNOSIS — E78.5 HYPERLIPIDEMIA, UNSPECIFIED HYPERLIPIDEMIA TYPE: ICD-10-CM

## 2019-02-26 DIAGNOSIS — G60.9 NEUROPATHY, PERIPHERAL, IDIOPATHIC: ICD-10-CM

## 2019-02-26 DIAGNOSIS — G43.909 MIGRAINE WITHOUT STATUS MIGRAINOSUS, NOT INTRACTABLE, UNSPECIFIED MIGRAINE TYPE: ICD-10-CM

## 2019-02-26 DIAGNOSIS — I63.9 CEREBROVASCULAR ACCIDENT (CVA), UNSPECIFIED MECHANISM (HCC): ICD-10-CM

## 2019-02-26 PROCEDURE — 99214 OFFICE O/P EST MOD 30 MIN: CPT | Performed by: NURSE PRACTITIONER

## 2019-02-26 RX ORDER — ACETAMINOPHEN 500 MG
TABLET ORAL AS NEEDED
COMMUNITY
Start: 2019-01-04

## 2019-02-26 RX ORDER — ATORVASTATIN CALCIUM 20 MG/1
20 TABLET, FILM COATED ORAL DAILY
Qty: 30 TABLET | Refills: 11 | Status: SHIPPED | OUTPATIENT
Start: 2019-02-26 | End: 2020-03-23

## 2019-02-26 RX ORDER — GABAPENTIN 400 MG/1
400 CAPSULE ORAL 3 TIMES DAILY
Qty: 90 CAPSULE | Refills: 2 | Status: SHIPPED | OUTPATIENT
Start: 2019-02-26 | End: 2019-03-31 | Stop reason: SDUPTHER

## 2019-02-26 NOTE — TELEPHONE ENCOUNTER
Patients prescription for Neurontin 400mg TID had printed.  Phone in  patients prescription for Neurontin 400mg- take 1 capsule by mouth TID to patients pharmacy.   Printed prescription was then shredded.

## 2019-02-26 NOTE — PROGRESS NOTES
CC: CADASIL and complex partial seizures.       HPI:  Bhavana Teixeira is a  58 y.o.  right-handed female up for CADASIL and complex partial seizures.       She was last seen on 8/3/2018 by Dr. Wally Boyd.  In January as an 16 she developed sudden onset of dizziness and double vision which time her MRI showed acute stroke of the right parietal lobe along with consistent findings of the diagnosis of CADASIL.  Prior to this event she was on Plavix considered failure of therapy.  At discharge she was placed on aspirin 325 on with high-dose statin.  She is on certain of her medications for we will contact pharmacy to further clarify.  Her last LDL 1/7/2018 was 39 therefore lower dose statin is recommended i.e. 10-20 mg.     Since last seen Dr. Boyd she denies any seizures.  Her  reports that she is typically consistent with taking her medications although she does forget sometimes.  She has had one fall last week since last being seen.  The fall was not injury.  Reports she fell in the bathtub while trying to get out.  She denies loss of consciousness.  She reports a daily headache; she takes Neurontin 600 mg twice daily currently.  The headache essentially has no associated symptoms other than intermittent neck stiffness and photophobia.  He describes the pain as dull.  Headache is typically controlled with Tylenol.  She reports pain with voiding; denies fever.  I recommended follow-up with her PCP for this.    Her systolic blood pressure is consistently less than 130 according to her  who checks it every day.  He denies any issues with mood; no concerns for depression. She reports good quality of sleep; she is compliant with her CPAP.  She reports having a barium swallow 11/2/2018 (reviewed chart in care everywhere) which revealed that she was aspirating.  He remains on a regular diet although she has been instructed to do chin tucks with swallowing and has been advised to not use any straws.   Additionally she describes difficulty speaking and feels like she frequently runs out of breath and/or tires with speaking.  I recommended speech therapy as an outpatient; both she and her  declined at this time.  Her  reports that she is often slow to respond and has intermittent confusion.  Otherwise, they deny any new signs and/or symptoms of stroke.  She uses a cane to ambulate.  She used a cane prior to this event as well.  She is limited in the amount of ADLs that she can do according to both her and her .  She does laundry and does still drive some.  She does not cook, clean, and/or manage finances or her medications.  She denies any other complaints and/or concerns on my exam.             Past Medical History:   Diagnosis Date   • Arthritis    • Cervical spine disease    • Degenerative joint disease (DJD) of lumbar spine    • Depression    • Migraine    • Seasonal allergies    • Seizures (CMS/HCC)    • Sleep apnea    • Thyroid disease          Past Surgical History:   Procedure Laterality Date   • BACK SURGERY     • CHOLECYSTECTOMY     • HYSTERECTOMY             Current Outpatient Medications:   •  acetaminophen (TYLENOL) 500 MG tablet, As Needed., Disp: , Rfl:   •  aspirin  MG tablet, Take 1 tablet by mouth Daily., Disp: 30 tablet, Rfl: 0  •  atorvastatin (LIPITOR) 80 MG tablet, Take 1 tablet by mouth Every Night. (Patient taking differently: Take 20 mg by mouth Every Night.), Disp: 30 tablet, Rfl: 0  •  Coenzyme Q10 400 MG capsule, Take 1 capsule by mouth Daily., Disp: , Rfl:   •  conjugated estrogens (PREMARIN) 0.625 MG/GM vaginal cream, Insert 0.25 applicators into the vagina 1 (One) Time Per Week., Disp: , Rfl:   •  cyanocobalamin 1000 MCG/ML injection, Inject 1,000 mcg into the shoulder, thigh, or buttocks Every 28 (Twenty-Eight) Days., Disp: , Rfl:   •  donepezil (ARICEPT) 10 MG tablet, Take 1 tablet by mouth Every Night., Disp: 90 tablet, Rfl: 3  •  FLUoxetine (PROzac) 20  MG capsule, Take 20 mg by mouth Daily., Disp: , Rfl:   •  fluticasone (FLONASE) 50 MCG/ACT nasal spray, 2 sprays into each nostril Daily., Disp: , Rfl:   •  gabapentin (NEURONTIN) 600 MG tablet, Take 1 tablet by mouth 2 (Two) Times a Day., Disp: 180 tablet, Rfl: 3  •  lansoprazole (PREVACID) 30 MG capsule, Take 30 mg by mouth Daily., Disp: , Rfl:   •  levETIRAcetam (KEPPRA) 500 MG tablet, Take 0.5 tablets by mouth Every 12 (Twelve) Hours., Disp: 90 tablet, Rfl: 3  •  levothyroxine (SYNTHROID, LEVOTHROID) 125 MCG tablet, Take 125 mcg by mouth Daily., Disp: , Rfl:   •  magnesium oxide (MAG-OX) 400 MG tablet, Take 400 mg by mouth Daily., Disp: , Rfl:   •  meloxicam (MOBIC) 15 MG tablet, Take 15 mg by mouth Daily., Disp: , Rfl:   •  memantine (NAMENDA) 5 MG tablet, Take 1 tablet by mouth 2 (Two) Times a Day., Disp: 180 tablet, Rfl: 3  •  Mirabegron ER (MYRBETRIQ) 50 MG tablet sustained-release 24 hour 24 hr tablet, Take 50 mg by mouth Daily., Disp: , Rfl:   •  Multiple Vitamins-Minerals (MULTIVITAMIN & MINERAL PO), Take 1 tablet by mouth Daily., Disp: , Rfl:   •  triamterene-hydrochlorothiazide (MAXZIDE-25) 37.5-25 MG per tablet, Take 1 tablet by mouth., Disp: , Rfl:   •  zonisamide (ZONEGRAN) 100 MG capsule, Take 3 capsules by mouth Every Night., Disp: 270 capsule, Rfl: 3  •  acetaminophen (TYLENOL) 325 MG tablet, Take 2 tablets by mouth Every 4 (Four) Hours As Needed for Mild Pain  or Fever (Temperature greater than or equal to 37 C)., Disp: , Rfl:   •  coenzyme Q10 100 MG capsule, Take 800 mg by mouth Daily., Disp: , Rfl:   •  levothyroxine (SYNTHROID, LEVOTHROID) 125 MCG tablet, Take 1 tablet by mouth Daily., Disp: 30 tablet, Rfl: 0      Family History   Problem Relation Age of Onset   • Migraines Mother    • Migraines Father    • Multiple sclerosis Father    • Migraines Sister          Social History     Socioeconomic History   • Marital status:      Spouse name: Not on file   • Number of children: Not on  file   • Years of education: Not on file   • Highest education level: Not on file   Social Needs   • Financial resource strain: Not on file   • Food insecurity - worry: Not on file   • Food insecurity - inability: Not on file   • Transportation needs - medical: Not on file   • Transportation needs - non-medical: Not on file   Occupational History   • Not on file   Tobacco Use   • Smoking status: Never Smoker   • Smokeless tobacco: Never Used   Substance and Sexual Activity   • Alcohol use: Yes     Comment: social    • Drug use: No   • Sexual activity: No   Other Topics Concern   • Not on file   Social History Narrative   • Not on file         Allergies   Allergen Reactions   • Amlodipine Swelling   • Amlodipine Besylate Swelling   • Penicillins Hives   • Lisinopril Other (See Comments)     COUGH   • Metoclopramide Other (See Comments)     SLEEPY   • Oxycodone Nausea Only         Pain Scale:2/10        ROS:  Review of Systems   Constitutional: Positive for activity change (less energy). Negative for appetite change and unexpected weight change.   HENT: Positive for trouble swallowing. Negative for facial swelling and tinnitus.    Eyes: Positive for pain (when looking to left). Negative for redness and visual disturbance.   Respiratory: Positive for shortness of breath. Negative for chest tightness and wheezing.    Cardiovascular: Positive for chest pain (occasional). Negative for palpitations and leg swelling.   Gastrointestinal: Negative for diarrhea, nausea and vomiting.   Endocrine: Negative for polydipsia, polyphagia and polyuria.   Musculoskeletal: Positive for back pain, gait problem and neck pain. Negative for arthralgias, joint swelling, myalgias and neck stiffness.   Neurological: Positive for speech difficulty, light-headedness and headaches. Negative for dizziness, tremors, seizures, syncope, facial asymmetry, weakness and numbness (both legs).   Hematological: Negative for adenopathy. Does not  "bruise/bleed easily.   Psychiatric/Behavioral: Negative for agitation, behavioral problems, confusion, decreased concentration, dysphoric mood, hallucinations, self-injury, sleep disturbance and suicidal ideas. The patient is not nervous/anxious and is not hyperactive.            Physical Exam:  Vitals:    02/26/19 1248   Pulse: 70   SpO2: 92%   Weight: 111 kg (245 lb)   Height: 172.7 cm (68\")     Body mass index is 37.25 kg/m².    Physical Exam    Gen.: Obese white female no acute distress  HEENT: Normocephalic no evidence of trauma.  Mildly irregular pupils left 4 mm and right 3 mm  Neck: Supple.  No thyromegaly.  No cervical bruits.  Radial pulses strong and simultaneous  Heart: Regular rate and rhythm without murmurs        Neurological Exam:   Mental status: The patient is awake and alert.    Able to recall building type, floor, month, day, year, and directions.  She is unable to name the current president.she has a flat affect.  No evidence of an affective disorder thought disorder delusions or hallucinations.  HCF: No aphasia.  No clear-cut apraxia.  No dysarthria.  Knowledge of recent events intact.  Cranial nerves: 2-12 intact except there is horizontal diplopia; glasses have prisms.  No nystagmus.   Motor: Normal tone and bulk.  No focal weakness  Reflexes: Toes downgoing  Sensory: Light touch and pinprick are diffusely intact  Cerebellar: Finger to nose rapid movements slow and equal   Gait and Station casual walk is slow broad-based and mildly unsteady.  Uses cane to assist with mobility         Results:      Lab Results   Component Value Date    GLUCOSE 97 01/07/2018    BUN 15 01/07/2018    CREATININE 1.03 (H) 01/07/2018    EGFRIFNONA 55 (L) 01/07/2018    BCR 14.6 01/07/2018    CO2 23.0 01/07/2018    CALCIUM 8.8 01/07/2018    ALBUMIN 3.80 01/07/2018    AST 18 01/07/2018    ALT 7 01/07/2018       Lab Results   Component Value Date    WBC 6.38 01/07/2018    HGB 12.9 01/07/2018    HCT 40.4 01/07/2018    " MCV 84.5 01/07/2018     01/07/2018         .  Lab Results   Component Value Date    RPR Non-Reactive 01/08/2018         Lab Results   Component Value Date    TSH 1.600 11/08/2017         No results found for: [id number]      No results found for: FOLATE      Lab Results   Component Value Date    HGBA1C 5.60 01/07/2018               Assessment:   1. CADSIL-no new signs and symptoms of stroke and/or TIA since January  2.  Complex partial seizures; controlled  3.  Peripheral neuropathy; Neurontin  4.  Headache; Neurontin            Plan:  · Switch Neurontin to 400 mg 3 times per day to assist with headache management  · Will verify statin dosing; dosing should be 10-20 mg based on last LDL 1/17/2018 of 39  · Plan to repeat Mini-Mental exam at next visit  · Recommend follow-up with PCP regarding urinary symptoms  · Consider speech therapy in the future if patient changes her mind  · Continue aspirin and statin (dose of statin to be determined), Neurontin per new instructions, Keppra and Zonegran  · Continue Aricept and memantine as written   · Follow-up with me in 3 months; sooner if needed              Dictated utilizing Dragon dictation.

## 2019-03-05 ENCOUNTER — TELEPHONE (OUTPATIENT)
Dept: NEUROLOGY | Facility: CLINIC | Age: 59
End: 2019-03-05

## 2019-03-05 NOTE — TELEPHONE ENCOUNTER
----- Message from KEMAL Rodney sent at 3/4/2019  3:31 PM EST -----  Regarding: RE: Medications  Okay the patient will need to continue on all 3 of the medications discussed below.  You call and please reiterate this to the patient.  Lastly, will you confirm what dose of statin medication she is on?       ----- Message -----  From: Lucita Duke MA  Sent: 3/4/2019   8:35 AM  To: KEMAL Rodney  Subject: Medications                                      Tabatha and I looked over the medications list that the pharmacy had on file for the patient. Tabatha called the pharmacy and was told the patient was given a three month supply of Zonisamide 100 mg - tid and a three month supply of Levetiracetam 500 mg - .5 q 12 hrs in August and patient has never refilled them.  Pt has not yet picked up her Gabapentin either.  Patient wasn't sure about meds when in the office last week, neither was her .

## 2019-03-05 NOTE — TELEPHONE ENCOUNTER
Per patient's , patient is taking Zonegran 100 mg 3 q pm. Prescription is being filled through [facility].    Per , patient is taking Keppra 500 mg .5 tab q 12 hrs. Also being filled through [facility].    Per patient, she is taking Lipitor 20 mg 1 q pm. Lipitor is being filled at [facility] in [place].

## 2019-03-31 DIAGNOSIS — G60.9 NEUROPATHY, PERIPHERAL, IDIOPATHIC: ICD-10-CM

## 2019-04-03 RX ORDER — GABAPENTIN 400 MG/1
CAPSULE ORAL
Qty: 90 CAPSULE | Refills: 0 | Status: SHIPPED | OUTPATIENT
Start: 2019-04-03 | End: 2019-04-28 | Stop reason: SDUPTHER

## 2019-04-28 DIAGNOSIS — G60.9 NEUROPATHY, PERIPHERAL, IDIOPATHIC: ICD-10-CM

## 2019-04-30 RX ORDER — GABAPENTIN 400 MG/1
CAPSULE ORAL
Qty: 90 CAPSULE | Refills: 0 | Status: SHIPPED | OUTPATIENT
Start: 2019-04-30 | End: 2019-05-26 | Stop reason: SDUPTHER

## 2019-05-26 DIAGNOSIS — G60.9 NEUROPATHY, PERIPHERAL, IDIOPATHIC: ICD-10-CM

## 2019-06-03 RX ORDER — GABAPENTIN 400 MG/1
CAPSULE ORAL
Qty: 90 CAPSULE | Refills: 0 | Status: SHIPPED | OUTPATIENT
Start: 2019-06-03 | End: 2019-07-17 | Stop reason: SDUPTHER

## 2019-07-17 DIAGNOSIS — G60.9 NEUROPATHY, PERIPHERAL, IDIOPATHIC: ICD-10-CM

## 2019-07-18 RX ORDER — GABAPENTIN 400 MG/1
CAPSULE ORAL
Qty: 90 CAPSULE | Refills: 0 | Status: SHIPPED | OUTPATIENT
Start: 2019-07-18 | End: 2019-09-18 | Stop reason: SDUPTHER

## 2019-09-15 DIAGNOSIS — G60.9 NEUROPATHY, PERIPHERAL, IDIOPATHIC: ICD-10-CM

## 2019-09-16 RX ORDER — GABAPENTIN 400 MG/1
CAPSULE ORAL
Qty: 90 CAPSULE | Refills: 0 | OUTPATIENT
Start: 2019-09-16

## 2019-09-16 NOTE — TELEPHONE ENCOUNTER
Pt was due f/u in June, 2019.  Has cancelled last appt and has not rescheduled.  Okay to refill?    Thank you

## 2019-09-18 DIAGNOSIS — G60.9 NEUROPATHY, PERIPHERAL, IDIOPATHIC: ICD-10-CM

## 2019-09-18 RX ORDER — GABAPENTIN 400 MG/1
400 CAPSULE ORAL 3 TIMES DAILY
Qty: 90 CAPSULE | Refills: 0 | Status: SHIPPED | OUTPATIENT
Start: 2019-09-18

## 2019-09-18 NOTE — TELEPHONE ENCOUNTER
----- Message from Lisa Valera sent at 9/18/2019  4:07 PM EDT -----  I spoke with patient to inform her we do not take her ins. She said we are out of network. I asked if she needed a referral to a new neurologist and she informed me her PCP already did that. She has an appointment schedule with [facility] is January. I told her Lynn would refill her gabapentin this one last time. She would need to speak with her PCP about filling it until she is established with [facility].

## 2020-03-22 DIAGNOSIS — E78.5 HYPERLIPIDEMIA, UNSPECIFIED HYPERLIPIDEMIA TYPE: ICD-10-CM

## 2020-03-23 RX ORDER — ATORVASTATIN CALCIUM 20 MG/1
TABLET, FILM COATED ORAL
Qty: 90 TABLET | Refills: 3 | Status: SHIPPED | OUTPATIENT
Start: 2020-03-23 | End: 2021-03-30

## 2021-03-30 DIAGNOSIS — E78.5 HYPERLIPIDEMIA, UNSPECIFIED HYPERLIPIDEMIA TYPE: ICD-10-CM

## 2021-03-30 RX ORDER — ATORVASTATIN CALCIUM 20 MG/1
TABLET, FILM COATED ORAL
Qty: 90 TABLET | Refills: 3 | Status: SHIPPED | OUTPATIENT
Start: 2021-03-30

## 2021-03-30 NOTE — TELEPHONE ENCOUNTER
No longer a pt here due to insurance change. Pt now sees Dr Toth at [facility].      Thank you

## 2022-04-02 DIAGNOSIS — E78.5 HYPERLIPIDEMIA, UNSPECIFIED HYPERLIPIDEMIA TYPE: ICD-10-CM

## 2022-04-04 RX ORDER — ATORVASTATIN CALCIUM 20 MG/1
TABLET, FILM COATED ORAL
Qty: 90 TABLET | Refills: 3 | OUTPATIENT
Start: 2022-04-04

## 2022-09-20 ENCOUNTER — HOSPITAL ENCOUNTER (EMERGENCY)
Facility: HOSPITAL | Age: 62
Discharge: HOME | End: 2022-09-20
Attending: EMERGENCY MEDICINE | Admitting: EMERGENCY MEDICINE

## 2022-09-20 ENCOUNTER — APPOINTMENT (OUTPATIENT)
Dept: GENERAL RADIOLOGY | Facility: HOSPITAL | Age: 62
End: 2022-09-20

## 2022-09-20 VITALS
BODY MASS INDEX: 34.4 KG/M2 | DIASTOLIC BLOOD PRESSURE: 81 MMHG | HEIGHT: 68 IN | HEART RATE: 72 BPM | TEMPERATURE: 97.3 F | WEIGHT: 227 LBS | SYSTOLIC BLOOD PRESSURE: 147 MMHG | OXYGEN SATURATION: 98 % | RESPIRATION RATE: 16 BRPM

## 2022-09-20 DIAGNOSIS — R53.1 GENERALIZED WEAKNESS: ICD-10-CM

## 2022-09-20 DIAGNOSIS — Z86.73 HISTORY OF CVA (CEREBROVASCULAR ACCIDENT): ICD-10-CM

## 2022-09-20 DIAGNOSIS — N39.0 URINARY TRACT INFECTION IN FEMALE: Primary | ICD-10-CM

## 2022-09-20 LAB
ALBUMIN SERPL-MCNC: 3.7 G/DL (ref 3.5–5.2)
ALBUMIN/GLOB SERPL: 1.1 G/DL
ALP SERPL-CCNC: 158 U/L (ref 39–117)
ALT SERPL W P-5'-P-CCNC: 16 U/L (ref 1–33)
ANION GAP SERPL CALCULATED.3IONS-SCNC: 11.3 MMOL/L (ref 5–15)
AST SERPL-CCNC: 29 U/L (ref 1–32)
BACTERIA UR QL AUTO: ABNORMAL /HPF
BASOPHILS # BLD AUTO: 0.04 10*3/MM3 (ref 0–0.2)
BASOPHILS NFR BLD AUTO: 0.8 % (ref 0–1.5)
BILIRUB SERPL-MCNC: 0.4 MG/DL (ref 0–1.2)
BILIRUB UR QL STRIP: NEGATIVE
BUN SERPL-MCNC: 15 MG/DL (ref 8–23)
BUN/CREAT SERPL: 19.2 (ref 7–25)
CALCIUM SPEC-SCNC: 9.1 MG/DL (ref 8.6–10.5)
CHLORIDE SERPL-SCNC: 106 MMOL/L (ref 98–107)
CK SERPL-CCNC: 54 U/L (ref 20–180)
CLARITY UR: ABNORMAL
CO2 SERPL-SCNC: 23.7 MMOL/L (ref 22–29)
COLOR UR: YELLOW
CREAT SERPL-MCNC: 0.78 MG/DL (ref 0.57–1)
DEPRECATED RDW RBC AUTO: 42.2 FL (ref 37–54)
EGFRCR SERPLBLD CKD-EPI 2021: 84.9 ML/MIN/1.73
EOSINOPHIL # BLD AUTO: 0.16 10*3/MM3 (ref 0–0.4)
EOSINOPHIL NFR BLD AUTO: 3 % (ref 0.3–6.2)
ERYTHROCYTE [DISTWIDTH] IN BLOOD BY AUTOMATED COUNT: 13.7 % (ref 12.3–15.4)
GLOBULIN UR ELPH-MCNC: 3.5 GM/DL
GLUCOSE SERPL-MCNC: 108 MG/DL (ref 65–99)
GLUCOSE UR STRIP-MCNC: NEGATIVE MG/DL
HCT VFR BLD AUTO: 41.8 % (ref 34–46.6)
HGB BLD-MCNC: 13.1 G/DL (ref 12–15.9)
HGB UR QL STRIP.AUTO: NEGATIVE
HYALINE CASTS UR QL AUTO: ABNORMAL /LPF
IMM GRANULOCYTES # BLD AUTO: 0.02 10*3/MM3 (ref 0–0.05)
IMM GRANULOCYTES NFR BLD AUTO: 0.4 % (ref 0–0.5)
KETONES UR QL STRIP: NEGATIVE
LEUKOCYTE ESTERASE UR QL STRIP.AUTO: ABNORMAL
LYMPHOCYTES # BLD AUTO: 0.98 10*3/MM3 (ref 0.7–3.1)
LYMPHOCYTES NFR BLD AUTO: 18.6 % (ref 19.6–45.3)
MAGNESIUM SERPL-MCNC: 1.9 MG/DL (ref 1.6–2.4)
MCH RBC QN AUTO: 26.5 PG (ref 26.6–33)
MCHC RBC AUTO-ENTMCNC: 31.3 G/DL (ref 31.5–35.7)
MCV RBC AUTO: 84.6 FL (ref 79–97)
MONOCYTES # BLD AUTO: 0.38 10*3/MM3 (ref 0.1–0.9)
MONOCYTES NFR BLD AUTO: 7.2 % (ref 5–12)
NEUTROPHILS NFR BLD AUTO: 3.68 10*3/MM3 (ref 1.7–7)
NEUTROPHILS NFR BLD AUTO: 70 % (ref 42.7–76)
NITRITE UR QL STRIP: POSITIVE
NRBC BLD AUTO-RTO: 0 /100 WBC (ref 0–0.2)
NT-PROBNP SERPL-MCNC: 16.8 PG/ML (ref 0–900)
PH UR STRIP.AUTO: 7 [PH] (ref 5–8)
PLATELET # BLD AUTO: 208 10*3/MM3 (ref 140–450)
PMV BLD AUTO: 10.8 FL (ref 6–12)
POTASSIUM SERPL-SCNC: 4 MMOL/L (ref 3.5–5.2)
PROT SERPL-MCNC: 7.2 G/DL (ref 6–8.5)
PROT UR QL STRIP: ABNORMAL
QT INTERVAL: 427 MS
RBC # BLD AUTO: 4.94 10*6/MM3 (ref 3.77–5.28)
RBC # UR STRIP: ABNORMAL /HPF
REF LAB TEST METHOD: ABNORMAL
SARS-COV-2 RNA RESP QL NAA+PROBE: NOT DETECTED
SODIUM SERPL-SCNC: 141 MMOL/L (ref 136–145)
SP GR UR STRIP: 1.01 (ref 1–1.03)
SQUAMOUS #/AREA URNS HPF: ABNORMAL /HPF
TROPONIN T SERPL-MCNC: <0.01 NG/ML (ref 0–0.03)
UROBILINOGEN UR QL STRIP: ABNORMAL
WBC # UR STRIP: ABNORMAL /HPF
WBC NRBC COR # BLD: 5.26 10*3/MM3 (ref 3.4–10.8)

## 2022-09-20 PROCEDURE — 83735 ASSAY OF MAGNESIUM: CPT | Performed by: EMERGENCY MEDICINE

## 2022-09-20 PROCEDURE — 81001 URINALYSIS AUTO W/SCOPE: CPT | Performed by: EMERGENCY MEDICINE

## 2022-09-20 PROCEDURE — 84484 ASSAY OF TROPONIN QUANT: CPT | Performed by: EMERGENCY MEDICINE

## 2022-09-20 PROCEDURE — P9612 CATHETERIZE FOR URINE SPEC: HCPCS

## 2022-09-20 PROCEDURE — 25010000002 CEFTRIAXONE PER 250 MG: Performed by: EMERGENCY MEDICINE

## 2022-09-20 PROCEDURE — U0003 INFECTIOUS AGENT DETECTION BY NUCLEIC ACID (DNA OR RNA); SEVERE ACUTE RESPIRATORY SYNDROME CORONAVIRUS 2 (SARS-COV-2) (CORONAVIRUS DISEASE [COVID-19]), AMPLIFIED PROBE TECHNIQUE, MAKING USE OF HIGH THROUGHPUT TECHNOLOGIES AS DESCRIBED BY CMS-2020-01-R: HCPCS | Performed by: EMERGENCY MEDICINE

## 2022-09-20 PROCEDURE — 83880 ASSAY OF NATRIURETIC PEPTIDE: CPT | Performed by: EMERGENCY MEDICINE

## 2022-09-20 PROCEDURE — 96365 THER/PROPH/DIAG IV INF INIT: CPT

## 2022-09-20 PROCEDURE — 82550 ASSAY OF CK (CPK): CPT | Performed by: EMERGENCY MEDICINE

## 2022-09-20 PROCEDURE — 93010 ELECTROCARDIOGRAM REPORT: CPT | Performed by: INTERNAL MEDICINE

## 2022-09-20 PROCEDURE — 85025 COMPLETE CBC W/AUTO DIFF WBC: CPT | Performed by: EMERGENCY MEDICINE

## 2022-09-20 PROCEDURE — 93005 ELECTROCARDIOGRAM TRACING: CPT | Performed by: EMERGENCY MEDICINE

## 2022-09-20 PROCEDURE — 71045 X-RAY EXAM CHEST 1 VIEW: CPT

## 2022-09-20 PROCEDURE — 99284 EMERGENCY DEPT VISIT MOD MDM: CPT

## 2022-09-20 PROCEDURE — 80053 COMPREHEN METABOLIC PANEL: CPT | Performed by: EMERGENCY MEDICINE

## 2022-09-20 RX ORDER — SULFAMETHOXAZOLE AND TRIMETHOPRIM 800; 160 MG/1; MG/1
1 TABLET ORAL 2 TIMES DAILY
Qty: 14 TABLET | Refills: 0 | Status: SHIPPED | OUTPATIENT
Start: 2022-09-20

## 2022-09-20 RX ADMIN — SODIUM CHLORIDE, POTASSIUM CHLORIDE, SODIUM LACTATE AND CALCIUM CHLORIDE 1000 ML: 600; 310; 30; 20 INJECTION, SOLUTION INTRAVENOUS at 12:33

## 2022-09-20 RX ADMIN — CEFTRIAXONE SODIUM 1 G: 1 INJECTION, POWDER, FOR SOLUTION INTRAMUSCULAR; INTRAVENOUS at 14:09

## 2022-09-20 NOTE — ED PROVIDER NOTES
EMERGENCY DEPARTMENT ENCOUNTER    Room Number:  25/25  Date of encounter:  9/20/2022  PCP: Naya Mayfield APRN  Historian: Patient, , EMS      HPI:  Chief Complaint: Fall, weakness  A complete HPI/ROS/PMH/PSH/SH/FH are unobtainable due to: None    Context: Bhavana Teixeira is a 62 y.o. female who presents to the ED via [facility] from home after she called for fall and unable to get off the floor.  Initial called also mention shortness of breath but she denies any shortness of breath currently.  Patient initially had slid out of bed upside down and got stuck between the side table in her bed, had an episode of vomiting in that position.  Was able to get herself to the floor but unable to get herself up.  Is 1 month status post a stroke with residual right-sided weakness.  Patient denies any headache, chest pain, current shortness of breath no current nausea.  Has been having some diarrhea since returning from rehab 2 weeks ago.  Reports eating and drinking well.  Ambulatory with a walker.  Does have a history of recurrent falls but states that she has not fallen since returning home from rehab until today.  Denies any dizziness or lightheadedness.  No reports of any significant dysuria or urinary urgency or frequency.      MEDICAL RECORD REVIEW    MR brain without contrast August 18, 2020    IMPRESSION:     Small acute left frontal lobe infarct without edema or mass effect.     Dictated by: Darrick Rodriguez M.D.     Images and Report reviewed and interpreted by: Darrick Rodriguez M.D.     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) 12/12/2016   • Cervical radiculopathy 07/08/2013   • Degeneration of intervertebral disc of cervical  region 12/12/2016   • Depression 12/12/2016   • Fracture of distal end of radius 09/18/2015   • Static tremor 01/04/2013   • Family history of colon cancer 02/19/2014   • Gastroesophageal reflux disease 12/12/2016   • Headache 12/12/2016   • History of respiratory system disease 12/12/2016   • History of total hysterectomy with bilateral salpingo-oophorectomy (BSO) 02/19/2014   • Hypothyroidism 12/21/2012   • Irritable bowel syndrome 12/12/2016   • Adiposity 12/05/2014   • Osteopenia 02/28/2014   • Partial epilepsy with impairment of consciousness (HCC) 01/04/2013   • Encounter for preprocedural cardiovascular examination 12/05/2014   • Simple renal cyst 11/26/2013   • Lesion of vulva 02/19/2014   • Prolapse 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 12/10/2014   • Cerebrovascular accident (HCC) 07/08/2013   • Seizures (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   • Migraine 02/26/2019   • Sleep apnea 02/26/2019     Resolved Ambulatory Problems     Diagnosis Date Noted   • Degeneration of intervertebral disc of lumbar region 01/13/2015     Past Medical History:   Diagnosis Date   • Arthritis    • Cervical spine disease    • Degenerative joint disease (DJD) of lumbar spine    • Seasonal allergies    • Thyroid disease          PAST SURGICAL HISTORY  Past Surgical History:   Procedure Laterality Date   • BACK SURGERY     • CHOLECYSTECTOMY     • HYSTERECTOMY           FAMILY HISTORY  Family History   Problem Relation Age of Onset   • Migraines Mother    • Migraines Father    • Multiple sclerosis Father    • Migraines Sister          SOCIAL HISTORY  Social History     Socioeconomic History   • Marital status:    Tobacco Use   •  Smoking status: Never Smoker   • Smokeless tobacco: Never Used   Substance and Sexual Activity   • Alcohol use: Yes     Comment: social    • Drug use: No   • Sexual activity: Never         ALLERGIES  Amlodipine, Amlodipine besylate, Penicillins, Lisinopril, Metoclopramide, and Oxycodone        REVIEW OF SYSTEMS  Review of Systems     All systems reviewed and negative except for those discussed in HPI.       PHYSICAL EXAM    I have reviewed the triage vital signs and nursing notes.    ED Triage Vitals [09/20/22 1151]   Temp Heart Rate Resp BP SpO2   97.3 °F (36.3 °C) 79 16 120/80 97 %      Temp src Heart Rate Source Patient Position BP Location FiO2 (%)   Tympanic Monitor Sitting Right arm --       Physical Exam  General: No acute distress, nontoxic, nondiaphoretic  HEENT: Mucous membranes moist, atraumatic, EOMI  Neck: Full ROM  Pulm: Symmetric chest rise, nonlabored, lungs CTAB  Cardiovascular: Regular rate and rhythm, intact distal pulses  GI: Soft, nontender, nondistended, no rebound, no guarding, bowel sounds present  MSK: Full ROM, no deformity  Skin: Warm, dry  Neuro: Awake, alert, oriented x 4, GCS 15, moving all extremities, no dysarthria or aphasia  Psych: Calm, cooperative      N95, protective eye goggles, and gloves used during this encounter. Patient in surgical mask.      LAB RESULTS  Recent Results (from the past 24 hour(s))   ECG 12 Lead    Collection Time: 09/20/22 12:16 PM   Result Value Ref Range    QT Interval 427 ms   Comprehensive Metabolic Panel    Collection Time: 09/20/22 12:18 PM    Specimen: Arm, Left; Blood   Result Value Ref Range    Glucose 108 (H) 65 - 99 mg/dL    BUN 15 8 - 23 mg/dL    Creatinine 0.78 0.57 - 1.00 mg/dL    Sodium 141 136 - 145 mmol/L    Potassium 4.0 3.5 - 5.2 mmol/L    Chloride 106 98 - 107 mmol/L    CO2 23.7 22.0 - 29.0 mmol/L    Calcium 9.1 8.6 - 10.5 mg/dL    Total Protein 7.2 6.0 - 8.5 g/dL    Albumin 3.70 3.50 - 5.20 g/dL    ALT (SGPT) 16 1 - 33 U/L    AST (SGOT)  29 1 - 32 U/L    Alkaline Phosphatase 158 (H) 39 - 117 U/L    Total Bilirubin 0.4 0.0 - 1.2 mg/dL    Globulin 3.5 gm/dL    A/G Ratio 1.1 g/dL    BUN/Creatinine Ratio 19.2 7.0 - 25.0    Anion Gap 11.3 5.0 - 15.0 mmol/L    eGFR 84.9 >60.0 mL/min/1.73   CK    Collection Time: 09/20/22 12:18 PM    Specimen: Arm, Left; Blood   Result Value Ref Range    Creatine Kinase 54 20 - 180 U/L   Magnesium    Collection Time: 09/20/22 12:18 PM    Specimen: Arm, Left; Blood   Result Value Ref Range    Magnesium 1.9 1.6 - 2.4 mg/dL   CBC Auto Differential    Collection Time: 09/20/22 12:18 PM    Specimen: Arm, Left; Blood   Result Value Ref Range    WBC 5.26 3.40 - 10.80 10*3/mm3    RBC 4.94 3.77 - 5.28 10*6/mm3    Hemoglobin 13.1 12.0 - 15.9 g/dL    Hematocrit 41.8 34.0 - 46.6 %    MCV 84.6 79.0 - 97.0 fL    MCH 26.5 (L) 26.6 - 33.0 pg    MCHC 31.3 (L) 31.5 - 35.7 g/dL    RDW 13.7 12.3 - 15.4 %    RDW-SD 42.2 37.0 - 54.0 fl    MPV 10.8 6.0 - 12.0 fL    Platelets 208 140 - 450 10*3/mm3    Neutrophil % 70.0 42.7 - 76.0 %    Lymphocyte % 18.6 (L) 19.6 - 45.3 %    Monocyte % 7.2 5.0 - 12.0 %    Eosinophil % 3.0 0.3 - 6.2 %    Basophil % 0.8 0.0 - 1.5 %    Immature Grans % 0.4 0.0 - 0.5 %    Neutrophils, Absolute 3.68 1.70 - 7.00 10*3/mm3    Lymphocytes, Absolute 0.98 0.70 - 3.10 10*3/mm3    Monocytes, Absolute 0.38 0.10 - 0.90 10*3/mm3    Eosinophils, Absolute 0.16 0.00 - 0.40 10*3/mm3    Basophils, Absolute 0.04 0.00 - 0.20 10*3/mm3    Immature Grans, Absolute 0.02 0.00 - 0.05 10*3/mm3    nRBC 0.0 0.0 - 0.2 /100 WBC   BNP    Collection Time: 09/20/22 12:18 PM    Specimen: Arm, Left; Blood   Result Value Ref Range    proBNP 16.8 0.0 - 900.0 pg/mL   Troponin    Collection Time: 09/20/22 12:18 PM    Specimen: Arm, Left; Blood   Result Value Ref Range    Troponin T <0.010 0.000 - 0.030 ng/mL   COVID-19, ANNITA IN-HOUSE CEPHEID/ORTIZ NP SWAB IN TRANSPORT MEDIA 8-12 HR TAT - Swab, Nasopharynx    Collection Time: 09/20/22 12:18 PM    Specimen:  Nasopharynx; Swab   Result Value Ref Range    COVID19 Not Detected Not Detected - Ref. Range   Urinalysis With Microscopic If Indicated (No Culture) - Urine, Catheter    Collection Time: 09/20/22 12:32 PM    Specimen: Urine, Catheter   Result Value Ref Range    Color, UA Yellow Yellow, Straw    Appearance, UA Turbid (A) Clear    pH, UA 7.0 5.0 - 8.0    Specific Gravity, UA 1.014 1.005 - 1.030    Glucose, UA Negative Negative    Ketones, UA Negative Negative    Bilirubin, UA Negative Negative    Blood, UA Negative Negative    Protein, UA Trace (A) Negative    Leuk Esterase, UA Moderate (2+) (A) Negative    Nitrite, UA Positive (A) Negative    Urobilinogen, UA 0.2 E.U./dL 0.2 - 1.0 E.U./dL   Urinalysis, Microscopic Only - Urine, Catheter    Collection Time: 09/20/22 12:32 PM    Specimen: Urine, Catheter   Result Value Ref Range    RBC, UA 0-2 None Seen, 0-2 /HPF    WBC, UA 21-30 (A) None Seen, 0-2 /HPF    Bacteria, UA 4+ (A) None Seen /HPF    Squamous Epithelial Cells, UA 0-2 None Seen, 0-2 /HPF    Hyaline Casts, UA 13-20 None Seen /LPF    Methodology Automated Microscopy        Ordered the above labs and independently reviewed the results.        RADIOLOGY  XR Chest 1 View    Result Date: 9/20/2022  EMERGENCY PORTABLE VIEW OF THE CHEST 09/20/2022  CLINICAL HISTORY: Dyspnea.  COMPARISON: There are no prior chest x-rays for comparison.  FINDINGS: The cardiomediastinal silhouette is upper limits of normal. Pulmonary vasculature is within normal limits. There is some linear stranding in the left perihilar region of left lung base likely linear areas of atelectasis or scarring. The remainder of the lungs are clear. Costophrenic angles are sharp.       1. No definite active disease is seen in the chest. There is minimal linear stranding left perihilar region and left lung base likely linear areas of atelectasis or scarring.  This report was finalized on 9/20/2022 1:31 PM by Dr. Robbie Ross M.D.        I ordered the above  noted radiological studies. Reviewed by me.  See dictation for official radiology interpretation.      PROCEDURES    Procedures  EKG    EKG Time: 1216  Rhythm/Rate: Sinus rhythm with a rate of 62  Borderline left axis deviation  Normal intervals  Nonspecific T wave abnormalities   No STEMI     Interpreted Contemporaneously by me, independently viewed  No emergent changes compared to January 2018        MEDICATIONS GIVEN IN ER    Medications   lactated ringers bolus 1,000 mL (0 mL Intravenous Stopped 9/20/22 1409)   cefTRIAXone (ROCEPHIN) 1 g in sodium chloride 0.9 % 100 mL IVPB-VTB (0 g Intravenous Stopped 9/20/22 1445)         PROGRESS, DATA ANALYSIS, CONSULTS, AND MEDICAL DECISION MAKING    All labs have been independently reviewed by me.  All radiology studies have been reviewed by me and discussed with radiologist dictating the report.   EKG's independently viewed and interpreted by me.  Discussion below represents my analysis of pertinent findings related to patient's condition, differential diagnosis, treatment plan and final disposition.    Initial concern for dehydration, renal failure, electrolyte abnormalities, UTI, anemia, among others.  Plan for labs, urinalysis, IV fluids, and reevaluation with results.    ED Course as of 09/20/22 2005   Tue Sep 20, 2022   1236 WBC: 5.26 [DC]   1236 Hemoglobin: 13.1 [DC]   1236 Platelets: 208 [DC]   1300 Glucose(!): 108 [DC]   1300 BUN: 15 [DC]   1300 Creatinine: 0.78 [DC]   1300 Sodium: 141 [DC]   1300 Potassium: 4.0 [DC]   1300 ALT (SGPT): 16 [DC]   1300 AST (SGOT): 29 [DC]   1300 Alkaline Phosphatase(!): 158 [DC]   1300 Total Bilirubin: 0.4 [DC]   1300 Nitrite, UA(!): Positive [DC]   1300 Leukocytes, UA(!): Moderate (2+) [DC]   1300 Ketones, UA: Negative [DC]   1300 Bacteria, UA(!): 4+ [DC]   1300 WBC, UA(!): 21-30 [DC]   1300 Creatine Kinase: 54 [DC]   1300 Troponin T: <0.010 [DC]   1300 proBNP: 16.8 [DC]   1322 Patient able to ambulate with a walker without  difficulty or assistance.  [DC]   1336 Patient updated on the findings today of UTI, plan to finish IV fluids, IV antibiotics, and ambulatory trial with a walker with disposition pending those results. [DC]   1436 Patient and  comfortable with plan for discharge at this time on antibiotics, close PCP follow-up, ED return for worsening symptoms as needed, all questions and concerns addressed. [DC]      ED Course User Index  [DC] Frandy Bobby MD       AS OF 20:05 EDT VITALS:    BP - 147/81  HR - 72  TEMP - 97.3 °F (36.3 °C) (Tympanic)  02 SATS - 98%        DIAGNOSIS  Final diagnoses:   Urinary tract infection in female   Generalized weakness   History of CVA (cerebrovascular accident)         DISPOSITION  DISCHARGE    Patient discharged in stable condition.    Reviewed implications of results, diagnosis, meds, responsibility to follow up, warning signs and symptoms of possible worsening, potential complications and reasons to return to ER.    Patient/Family voiced understanding of above instructions.    Discussed plan for discharge, as there is no emergent indication for admission. Pt/family is agreeable and understands need for follow up and repeat testing.  Pt is aware that discharge does not mean that nothing is wrong but it indicates no emergency is present that requires admission and they must continue care with follow-up as given below or physician of their choice.     FOLLOW-UP  [facility] Emergency Department  [address]  [facility] [address] [phone]    As needed, If symptoms worsen    Naya Mayfield, APRN  [address] [id number] [phone]    Schedule an appointment as soon as possible for a visit   for close follow up within 1-2 weeks         Medication List      New Prescriptions    sulfamethoxazole-trimethoprim 800-160 MG per tablet  Commonly known as: BACTRIM DS,SEPTRA DS  Take 1 tablet by mouth 2 (Two) Times a Day. Drink plenty of  water           Where to Get Your Medications      These medications were sent to [facility] [id number] [facility] [employer] - [place], [place] - [address] - [phone]  - [phone]   [address], [facility] [address]    Phone: [phone]   · sulfamethoxazole-trimethoprim 800-160 MG per tablet                    Frandy Bobby MD  09/20/22 2008

## 2022-09-20 NOTE — DISCHARGE INSTRUCTIONS
Complete course of antibiotics as prescribed, stay well-hydrated plenty of water, continue all other current medications, close PCP follow-up for recheck as needed, ED return for worsening symptoms as needed

## 2022-09-20 NOTE — ED NOTES
PT presents to ED via EMS from home. Pt called EMS for SOA N/V and weakness. PT was found on the floor in the back bedroom. PT had a stroke a month ago and has been weak since then. Pt fell off the bed and was unable to get up. Pt is A&OX3 with dementia and slower to answer questions, and in a mask.    Patient was placed in face mask during first look triage.  Patient was wearing a face mask throughout encounter.  I wore personal protective equipment throughout the encounter.  Hand hygiene was performed before and after patient encounter.

## 2023-04-27 ENCOUNTER — HOSPITAL ENCOUNTER (EMERGENCY)
Facility: HOSPITAL | Age: 63
Discharge: HOME | End: 2023-04-27
Attending: EMERGENCY MEDICINE
Payer: MEDICARE

## 2023-04-27 ENCOUNTER — APPOINTMENT (OUTPATIENT)
Dept: CT IMAGING | Facility: HOSPITAL | Age: 63
End: 2023-04-27
Payer: MEDICARE

## 2023-04-27 VITALS
SYSTOLIC BLOOD PRESSURE: 123 MMHG | DIASTOLIC BLOOD PRESSURE: 86 MMHG | OXYGEN SATURATION: 92 % | HEART RATE: 74 BPM | RESPIRATION RATE: 16 BRPM | TEMPERATURE: 98.4 F

## 2023-04-27 DIAGNOSIS — S09.90XA INJURY OF HEAD, INITIAL ENCOUNTER: Primary | ICD-10-CM

## 2023-04-27 PROCEDURE — 99283 EMERGENCY DEPT VISIT LOW MDM: CPT

## 2023-04-27 PROCEDURE — 70450 CT HEAD/BRAIN W/O DYE: CPT

## 2023-04-27 PROCEDURE — 63710000001 ONDANSETRON ODT 4 MG TABLET DISPERSIBLE: Performed by: PHYSICIAN ASSISTANT

## 2023-04-27 RX ORDER — ONDANSETRON 4 MG/1
8 TABLET, ORALLY DISINTEGRATING ORAL ONCE
Status: COMPLETED | OUTPATIENT
Start: 2023-04-27 | End: 2023-04-27

## 2023-04-27 RX ADMIN — ONDANSETRON 8 MG: 4 TABLET, ORALLY DISINTEGRATING ORAL at 18:42

## 2023-04-27 NOTE — ED NOTES
Pt via [facility] from home with c/o head injury d/t fall.   Pt is on plavix   Pt reports that she was walking with her walker when it got away from her and she fell hitting her head on the window seal.     Pt denies LOC

## 2023-04-27 NOTE — ED NOTES
Pt states her walker rolled away in front of her too fast causing her to fall and hit her head on the left side. No laceration or abrasion noted. During assessment pt did not complain of any tenderness on the left side of her head. Pt denies neck pain. Pt able to move all extremities without resistance or complications.   Pt denies double/blurred vision, nausea, vomiting or a HA.

## 2023-04-27 NOTE — ED PROVIDER NOTES
EMERGENCY DEPARTMENT ENCOUNTER    Room Number:  08/08  Date seen:  4/27/2023  PCP: Naya Mayfield APRN  Discussed/ obtained information from independent historians: patient, EMS report      HPI:  Chief Complaint: head injury  A complete HPI/ROS/PMH/PSH/SH/FH are unobtainable due to: none  Context: Bhavana Teixeira is a 62 y.o. female who presents to the ED c/o head injury just prior to arrival, arrives via EMS from home for further evaluation and treatment.  Lives at home with her , walks with a walker.  States that her walker got away from her and she fell striking her head on a windowsill.  She denies any loss of consciousness nausea vomiting or vision changes neck pain or any other pain or injury.  She was unable to get off the floor and EMS was called by .  She does take Plavix and aspirin.      External (non-ED) record review: Office visit with [place] neurology on 3/23/2023 for frequent falls and follow-up.  History of CADASIL hereditary stroke disorder, seizures.  Noted to have chronic weakness in all 4 extremities with some walking difficulties and uses a walker.  She was to continue on dual antiplatelet therapy with aspirin and Plavix, follow-up in the headache clinic for chronic headaches, continue Lipitor Keppra gabapentin and Namenda and seizure precautions reviewed, to follow-up in 6 months.      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) 12/12/2016   • Cervical radiculopathy 07/08/2013   • Degeneration of intervertebral disc of cervical region 12/12/2016   • Depression 12/12/2016   • Fracture of distal end of radius 09/18/2015   • Static tremor 01/04/2013   • Family history of colon cancer 02/19/2014   • Gastroesophageal reflux disease 12/12/2016   • Headache 12/12/2016   • History  of respiratory system disease 12/12/2016   • History of total hysterectomy with bilateral salpingo-oophorectomy (BSO) 02/19/2014   • Hypothyroidism 12/21/2012   • Irritable bowel syndrome 12/12/2016   • Adiposity 12/05/2014   • Osteopenia 02/28/2014   • Partial epilepsy with impairment of consciousness 01/04/2013   • Encounter for preprocedural cardiovascular examination 12/05/2014   • Simple renal cyst 11/26/2013   • Lesion of vulva 02/19/2014   • Prolapse 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 12/10/2014   • Cerebrovascular accident 07/08/2013   • Seizures 12/12/2016   • 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   • Transient ischemic attack 11/08/2017   • Hypernatremia 11/08/2017   • Hyperglycemia 11/08/2017   • DNR (do not resuscitate) 11/08/2017   • Cerebrovascular accident (CVA) 01/06/2018   • Obesity (BMI 30-39.9) 01/06/2018   • 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   • Migraine 02/26/2019   • Sleep apnea 02/26/2019     Resolved Ambulatory Problems     Diagnosis Date Noted   • Degeneration of intervertebral disc of lumbar region 01/13/2015     Past Medical History:   Diagnosis Date   • Arthritis    • Cervical spine disease    • Degenerative joint disease (DJD) of lumbar spine    • Seasonal allergies    • Thyroid disease          PAST SURGICAL HISTORY  Past Surgical History:   Procedure Laterality Date   • BACK SURGERY     • CHOLECYSTECTOMY     • HYSTERECTOMY           FAMILY HISTORY  Family History   Problem Relation Age of Onset   • Migraines Mother    • Migraines Father    • Multiple sclerosis Father    • Migraines Sister          SOCIAL HISTORY  Social History     Socioeconomic History   • Marital status:    Tobacco Use   • Smoking status: Never   • Smokeless tobacco: Never   Substance and Sexual Activity   • Alcohol use: Yes     Comment: social    • Drug use: No   • Sexual activity: Never         ALLERGIES  Amlodipine, Amlodipine besylate, Penicillins, Lisinopril, Metoclopramide, and  Oxycodone        REVIEW OF SYSTEMS  Review of Systems         PHYSICAL EXAM  ED Triage Vitals [04/27/23 1715]   Temp Heart Rate Resp BP SpO2   98.4 °F (36.9 °C) 82 16 136/64 98 %      Temp src Heart Rate Source Patient Position BP Location FiO2 (%)   Oral -- -- -- --       Physical Exam      GENERAL: no acute distress, well-appearing, conversational  HENT: normocephalic, atraumatic, no hemotympanum lane sign raccoon eyes septal hematoma otorrhea or rhinorrhea  EYES: no scleral icterus, PERRL, extract movements intact  CV: regular rhythm, normal rate  RESPIRATORY: normal effort CTA B  ABDOMEN: nondistended soft nontender normal bowel sounds no guarding or rigidity  MUSCULOSKELETAL: no deformity.  No C, T, L-spine tenderness.  Full range of motion of the neck without pain.  Extremities are atraumatic with appropriate range of motion  NEURO: alert, moves all extremities, follows commands  PSYCH:  calm, cooperative  SKIN: warm, dry    Vital signs and nursing notes reviewed.            RADIOLOGY  CT Head Without Contrast    Result Date: 4/27/2023  HEAD CT WITHOUT CONTRAST  HISTORY: Fall, head injury  TECHNIQUE: Radiation dose reduction techniques were utilized, including automated exposure control and exposure modulation based on body size. Axial images were obtained from the skull base to vertex without IV contrast.  COMPARISON: 01/06/2018  FINDINGS: No evidence for intracranial hemorrhage, acute cortical-based infarction, focal mass lesion or hydrocephalus. Patchy and confluent areas of low attenuation in the subcortical, periventricular and deep white matter are nonspecific but likely reflect sequela of chronic small vessel ischemia. The included orbits are unremarkable. The visualized sinuses and mastoids are clear. Calvarium intact      No acute intracranial abnormality.  Radiation dose reduction techniques were utilized, including automated exposure control and exposure modulation based on body size.  This report  was finalized on 4/27/2023 7:13 PM by Dr. Jhoan Boyd M.D.        Ordered the above noted radiological studies. Reviewed by me in PACS.            PROCEDURES  Procedures              MEDICATIONS GIVEN IN ER  Medications   ondansetron ODT (ZOFRAN-ODT) disintegrating tablet 8 mg (8 mg Oral Given 4/27/23 1842)                   MEDICAL DECISION MAKING, PROGRESS, and CONSULTS    All labs have been independently reviewed by me.  All radiology studies have been reviewed by me and I have also reviewed the radiology report.   EKG's independently viewed and interpreted by me.  Discussion below represents my analysis of pertinent findings related to patient's condition, differential diagnosis, treatment plan and final disposition.      Additional sources:    - Chronic or social conditions impacting care: Dementia, cared for by       Orders placed during this visit:  Orders Placed This Encounter   Procedures   • CT Head Without Contrast       Differential diagnosis:  Scalp contusion, skull fracture, intracranial hemorrhage      Independent interpretation of labs, radiology studies, and discussions with consultants:  ED Course as of 04/27/23 2241   Thu Apr 27, 2023   1811 The patient was taken to CT and upon lying her flat for her scan she became nauseated and started vomiting.  She was brought back to the room and I reassessed her.  She states when she they laid her down she felt dizzy and threw up.  She is no longer feeling dizzy.  We will give a dose of Zofran and try to get her back over for her scans soon. [KA]   1918 Patient had no difficulty with repeat attempted head CT.  It is unremarkable.  She has no signs of trauma, no traumatic symptoms and is stable for discharge in the care of her . [KA]      ED Course User Index  [KA] Cary Cespedes PA             Patient was wearing a face mask when I entered the room and they continued to wear a mask throughout their stay in the ED.  I wore PPE, including   gloves, face mask with shield or face mask with goggles whenever I was in the room with patient.     DIAGNOSIS  Final diagnoses:   Injury of head, initial encounter           Follow Up:  Naya Mayfield, APRN  [address] [facility] [id number] [phone]    In 2 days  As needed      RX:     Medication List      No changes were made to your prescriptions during this visit.         Latest Documented Vital Signs:  As of 22:41 EDT  BP- 123/86 HR- 74 Temp- 98.4 °F (36.9 °C) (Oral) O2 sat- 92%              --    Please note that portions of this were completed with a voice recognition program.       Note Disclaimer: At [facility], we believe that sharing information builds trust and better relationships. You are receiving this note because you are receiving care at [facility] or recently visited. It is possible you will see health information before a provider has talked with you about it. This kind of information can be easy to misunderstand. To help you fully understand what it means for your health, we urge you to discuss this note with your provider.           Cary Cespedes PA  04/27/23 2824

## 2024-06-06 ENCOUNTER — HOSPITAL ENCOUNTER (INPATIENT)
Facility: HOSPITAL | Age: 64
LOS: 4 days | Discharge: NURSING, REHAB OR LONG-TERM CARE | DRG: 312 | End: 2024-06-10
Attending: EMERGENCY MEDICINE | Admitting: INTERNAL MEDICINE
Payer: MEDICARE

## 2024-06-06 ENCOUNTER — APPOINTMENT (OUTPATIENT)
Dept: GENERAL RADIOLOGY | Facility: HOSPITAL | Age: 64
DRG: 312 | End: 2024-06-06
Payer: MEDICARE

## 2024-06-06 ENCOUNTER — APPOINTMENT (OUTPATIENT)
Dept: CT IMAGING | Facility: HOSPITAL | Age: 64
DRG: 312 | End: 2024-06-06
Payer: MEDICARE

## 2024-06-06 DIAGNOSIS — R41.82 ALTERED MENTAL STATUS, UNSPECIFIED ALTERED MENTAL STATUS TYPE: Primary | ICD-10-CM

## 2024-06-06 DIAGNOSIS — I67.850 CADASIL (CEREBRAL AUTOSOMAL DOMINANT ARTERIOPATHY WITH SUBCORTICAL INFARCTS AND LEUKOENCEPHALOPATHY): ICD-10-CM

## 2024-06-06 DIAGNOSIS — N39.0 ACUTE UTI: ICD-10-CM

## 2024-06-06 DIAGNOSIS — F01.50 VASCULAR DEMENTIA WITHOUT BEHAVIORAL DISTURBANCE: ICD-10-CM

## 2024-06-06 DIAGNOSIS — G40.209 PARTIAL EPILEPSY WITH IMPAIRMENT OF CONSCIOUSNESS: ICD-10-CM

## 2024-06-06 LAB
ALBUMIN SERPL-MCNC: 4 G/DL (ref 3.5–5.2)
ALBUMIN/GLOB SERPL: 1.1 G/DL
ALP SERPL-CCNC: 205 U/L (ref 39–117)
ALT SERPL W P-5'-P-CCNC: 21 U/L (ref 1–33)
ANION GAP SERPL CALCULATED.3IONS-SCNC: 8 MMOL/L (ref 5–15)
ARTERIAL PATENCY WRIST A: POSITIVE
AST SERPL-CCNC: 30 U/L (ref 1–32)
ATMOSPHERIC PRESS: 742.3 MMHG
BACTERIA UR QL AUTO: ABNORMAL /HPF
BASE EXCESS BLDA CALC-SCNC: 0.8 MMOL/L (ref 0–2)
BASOPHILS # BLD AUTO: 0.02 10*3/MM3 (ref 0–0.2)
BASOPHILS NFR BLD AUTO: 0.3 % (ref 0–1.5)
BDY SITE: ABNORMAL
BILIRUB SERPL-MCNC: 0.2 MG/DL (ref 0–1.2)
BILIRUB UR QL STRIP: NEGATIVE
BUN SERPL-MCNC: 17 MG/DL (ref 8–23)
BUN/CREAT SERPL: 19.8 (ref 7–25)
CALCIUM SPEC-SCNC: 9.3 MG/DL (ref 8.6–10.5)
CHLORIDE SERPL-SCNC: 108 MMOL/L (ref 98–107)
CLARITY UR: CLEAR
CO2 BLDA-SCNC: 27.9 MMOL/L (ref 23–27)
CO2 SERPL-SCNC: 27 MMOL/L (ref 22–29)
COLOR UR: YELLOW
CREAT SERPL-MCNC: 0.86 MG/DL (ref 0.57–1)
D-LACTATE SERPL-SCNC: 1.5 MMOL/L (ref 0.5–2)
DEPRECATED RDW RBC AUTO: 44.6 FL (ref 37–54)
DEVICE COMMENT: ABNORMAL
EGFRCR SERPLBLD CKD-EPI 2021: 75.1 ML/MIN/1.73
EOSINOPHIL # BLD AUTO: 0.18 10*3/MM3 (ref 0–0.4)
EOSINOPHIL NFR BLD AUTO: 3 % (ref 0.3–6.2)
ERYTHROCYTE [DISTWIDTH] IN BLOOD BY AUTOMATED COUNT: 14.6 % (ref 12.3–15.4)
GEN 5 2HR TROPONIN T REFLEX: 12 NG/L
GLOBULIN UR ELPH-MCNC: 3.7 GM/DL
GLUCOSE SERPL-MCNC: 109 MG/DL (ref 65–99)
GLUCOSE UR STRIP-MCNC: NEGATIVE MG/DL
HCO3 BLDA-SCNC: 26.5 MMOL/L (ref 22–28)
HCT VFR BLD AUTO: 42.7 % (ref 34–46.6)
HEMODILUTION: NO
HGB BLD-MCNC: 13.4 G/DL (ref 12–15.9)
HGB UR QL STRIP.AUTO: NEGATIVE
HYALINE CASTS UR QL AUTO: ABNORMAL /LPF
IMM GRANULOCYTES # BLD AUTO: 0.02 10*3/MM3 (ref 0–0.05)
IMM GRANULOCYTES NFR BLD AUTO: 0.3 % (ref 0–0.5)
INR PPP: 1.05 (ref 0.9–1.1)
KETONES UR QL STRIP: ABNORMAL
LEUKOCYTE ESTERASE UR QL STRIP.AUTO: ABNORMAL
LYMPHOCYTES # BLD AUTO: 1.46 10*3/MM3 (ref 0.7–3.1)
LYMPHOCYTES NFR BLD AUTO: 24.1 % (ref 19.6–45.3)
MAGNESIUM SERPL-MCNC: 1.9 MG/DL (ref 1.6–2.4)
MCH RBC QN AUTO: 26.4 PG (ref 26.6–33)
MCHC RBC AUTO-ENTMCNC: 31.4 G/DL (ref 31.5–35.7)
MCV RBC AUTO: 84.1 FL (ref 79–97)
MODALITY: ABNORMAL
MONOCYTES # BLD AUTO: 0.51 10*3/MM3 (ref 0.1–0.9)
MONOCYTES NFR BLD AUTO: 8.4 % (ref 5–12)
NEUTROPHILS NFR BLD AUTO: 3.88 10*3/MM3 (ref 1.7–7)
NEUTROPHILS NFR BLD AUTO: 63.9 % (ref 42.7–76)
NITRITE UR QL STRIP: POSITIVE
NRBC BLD AUTO-RTO: 0 /100 WBC (ref 0–0.2)
PCO2 BLDA: 45.1 MM HG (ref 35–45)
PH BLDA: 7.38 PH UNITS (ref 7.35–7.45)
PH UR STRIP.AUTO: 6.5 [PH] (ref 5–8)
PLATELET # BLD AUTO: 223 10*3/MM3 (ref 140–450)
PMV BLD AUTO: 11 FL (ref 6–12)
PO2 BLDA: 69.6 MM HG (ref 80–100)
POTASSIUM SERPL-SCNC: 4 MMOL/L (ref 3.5–5.2)
PROT SERPL-MCNC: 7.7 G/DL (ref 6–8.5)
PROT UR QL STRIP: NEGATIVE
PROTHROMBIN TIME: 14 SECONDS (ref 11.7–14.2)
QT INTERVAL: 434 MS
QTC INTERVAL: 455 MS
RBC # BLD AUTO: 5.08 10*6/MM3 (ref 3.77–5.28)
RBC # UR STRIP: ABNORMAL /HPF
REF LAB TEST METHOD: ABNORMAL
SAO2 % BLDCOA: 93.1 % (ref 92–98.5)
SET MECH RESP RATE: 18
SODIUM SERPL-SCNC: 143 MMOL/L (ref 136–145)
SP GR UR STRIP: 1.02 (ref 1–1.03)
SQUAMOUS #/AREA URNS HPF: ABNORMAL /HPF
TROPONIN T DELTA: -1 NG/L
TROPONIN T SERPL HS-MCNC: 13 NG/L
UROBILINOGEN UR QL STRIP: ABNORMAL
WBC # UR STRIP: ABNORMAL /HPF
WBC NRBC COR # BLD AUTO: 6.07 10*3/MM3 (ref 3.4–10.8)

## 2024-06-06 PROCEDURE — 25810000003 SODIUM CHLORIDE 0.9 % SOLUTION: Performed by: EMERGENCY MEDICINE

## 2024-06-06 PROCEDURE — 82803 BLOOD GASES ANY COMBINATION: CPT

## 2024-06-06 PROCEDURE — 85610 PROTHROMBIN TIME: CPT | Performed by: EMERGENCY MEDICINE

## 2024-06-06 PROCEDURE — 94799 UNLISTED PULMONARY SVC/PX: CPT

## 2024-06-06 PROCEDURE — 85025 COMPLETE CBC W/AUTO DIFF WBC: CPT | Performed by: EMERGENCY MEDICINE

## 2024-06-06 PROCEDURE — 80053 COMPREHEN METABOLIC PANEL: CPT | Performed by: EMERGENCY MEDICINE

## 2024-06-06 PROCEDURE — 87040 BLOOD CULTURE FOR BACTERIA: CPT | Performed by: EMERGENCY MEDICINE

## 2024-06-06 PROCEDURE — 87088 URINE BACTERIA CULTURE: CPT | Performed by: EMERGENCY MEDICINE

## 2024-06-06 PROCEDURE — 36600 WITHDRAWAL OF ARTERIAL BLOOD: CPT

## 2024-06-06 PROCEDURE — 93010 ELECTROCARDIOGRAM REPORT: CPT | Performed by: STUDENT IN AN ORGANIZED HEALTH CARE EDUCATION/TRAINING PROGRAM

## 2024-06-06 PROCEDURE — 71045 X-RAY EXAM CHEST 1 VIEW: CPT

## 2024-06-06 PROCEDURE — 94760 N-INVAS EAR/PLS OXIMETRY 1: CPT

## 2024-06-06 PROCEDURE — 99285 EMERGENCY DEPT VISIT HI MDM: CPT

## 2024-06-06 PROCEDURE — 87186 SC STD MICRODIL/AGAR DIL: CPT | Performed by: EMERGENCY MEDICINE

## 2024-06-06 PROCEDURE — 36415 COLL VENOUS BLD VENIPUNCTURE: CPT | Performed by: EMERGENCY MEDICINE

## 2024-06-06 PROCEDURE — 87086 URINE CULTURE/COLONY COUNT: CPT | Performed by: EMERGENCY MEDICINE

## 2024-06-06 PROCEDURE — 81001 URINALYSIS AUTO W/SCOPE: CPT | Performed by: EMERGENCY MEDICINE

## 2024-06-06 PROCEDURE — 83735 ASSAY OF MAGNESIUM: CPT | Performed by: EMERGENCY MEDICINE

## 2024-06-06 PROCEDURE — 84484 ASSAY OF TROPONIN QUANT: CPT | Performed by: EMERGENCY MEDICINE

## 2024-06-06 PROCEDURE — 93005 ELECTROCARDIOGRAM TRACING: CPT | Performed by: EMERGENCY MEDICINE

## 2024-06-06 PROCEDURE — 70450 CT HEAD/BRAIN W/O DYE: CPT

## 2024-06-06 PROCEDURE — 94761 N-INVAS EAR/PLS OXIMETRY MLT: CPT

## 2024-06-06 PROCEDURE — 25010000002 CEFTRIAXONE PER 250 MG: Performed by: EMERGENCY MEDICINE

## 2024-06-06 PROCEDURE — 83605 ASSAY OF LACTIC ACID: CPT | Performed by: EMERGENCY MEDICINE

## 2024-06-06 RX ORDER — CLOPIDOGREL BISULFATE 75 MG/1
75 TABLET ORAL DAILY
COMMUNITY

## 2024-06-06 RX ORDER — MEMANTINE HYDROCHLORIDE 5 MG/1
5 TABLET ORAL EVERY MORNING
Status: DISCONTINUED | OUTPATIENT
Start: 2024-06-07 | End: 2024-06-10 | Stop reason: HOSPADM

## 2024-06-06 RX ORDER — ONDANSETRON 4 MG/1
4 TABLET, ORALLY DISINTEGRATING ORAL EVERY 6 HOURS PRN
Status: DISCONTINUED | OUTPATIENT
Start: 2024-06-06 | End: 2024-06-10 | Stop reason: HOSPADM

## 2024-06-06 RX ORDER — FLUOXETINE HYDROCHLORIDE 20 MG/1
20 CAPSULE ORAL DAILY
Status: DISCONTINUED | OUTPATIENT
Start: 2024-06-07 | End: 2024-06-10 | Stop reason: HOSPADM

## 2024-06-06 RX ORDER — SODIUM CHLORIDE 0.9 % (FLUSH) 0.9 %
10 SYRINGE (ML) INJECTION AS NEEDED
Status: DISCONTINUED | OUTPATIENT
Start: 2024-06-06 | End: 2024-06-10 | Stop reason: HOSPADM

## 2024-06-06 RX ORDER — LOSARTAN POTASSIUM 50 MG/1
50 TABLET ORAL DAILY
COMMUNITY

## 2024-06-06 RX ORDER — LOPERAMIDE HYDROCHLORIDE 2 MG/1
2 CAPSULE ORAL EVERY 8 HOURS PRN
COMMUNITY

## 2024-06-06 RX ORDER — ACETAMINOPHEN 325 MG/1
650 TABLET ORAL EVERY 4 HOURS PRN
Status: DISCONTINUED | OUTPATIENT
Start: 2024-06-06 | End: 2024-06-10 | Stop reason: HOSPADM

## 2024-06-06 RX ORDER — UREA 10 %
2.5 LOTION (ML) TOPICAL NIGHTLY PRN
Status: DISCONTINUED | OUTPATIENT
Start: 2024-06-06 | End: 2024-06-10 | Stop reason: HOSPADM

## 2024-06-06 RX ORDER — GABAPENTIN 400 MG/1
400 CAPSULE ORAL 3 TIMES DAILY
Status: DISCONTINUED | OUTPATIENT
Start: 2024-06-06 | End: 2024-06-10 | Stop reason: HOSPADM

## 2024-06-06 RX ORDER — BISACODYL 10 MG
10 SUPPOSITORY, RECTAL RECTAL DAILY PRN
Status: DISCONTINUED | OUTPATIENT
Start: 2024-06-06 | End: 2024-06-10 | Stop reason: HOSPADM

## 2024-06-06 RX ORDER — DONEPEZIL HYDROCHLORIDE 10 MG/1
10 TABLET, FILM COATED ORAL NIGHTLY
Status: DISCONTINUED | OUTPATIENT
Start: 2024-06-06 | End: 2024-06-10 | Stop reason: HOSPADM

## 2024-06-06 RX ORDER — ONDANSETRON 2 MG/ML
4 INJECTION INTRAMUSCULAR; INTRAVENOUS EVERY 6 HOURS PRN
Status: DISCONTINUED | OUTPATIENT
Start: 2024-06-06 | End: 2024-06-10 | Stop reason: HOSPADM

## 2024-06-06 RX ORDER — LEVETIRACETAM 500 MG/1
500 TABLET ORAL EVERY 12 HOURS SCHEDULED
Status: DISCONTINUED | OUTPATIENT
Start: 2024-06-06 | End: 2024-06-10 | Stop reason: HOSPADM

## 2024-06-06 RX ORDER — MEMANTINE HYDROCHLORIDE 10 MG/1
10 TABLET ORAL NIGHTLY
Status: DISCONTINUED | OUTPATIENT
Start: 2024-06-06 | End: 2024-06-10 | Stop reason: HOSPADM

## 2024-06-06 RX ORDER — METHOCARBAMOL 500 MG/1
1000 TABLET, FILM COATED ORAL EVERY 8 HOURS PRN
Status: DISCONTINUED | OUTPATIENT
Start: 2024-06-06 | End: 2024-06-10 | Stop reason: HOSPADM

## 2024-06-06 RX ORDER — ATORVASTATIN CALCIUM 20 MG/1
40 TABLET, FILM COATED ORAL NIGHTLY
Status: DISCONTINUED | OUTPATIENT
Start: 2024-06-06 | End: 2024-06-10 | Stop reason: HOSPADM

## 2024-06-06 RX ORDER — AMOXICILLIN 250 MG
2 CAPSULE ORAL 2 TIMES DAILY PRN
Status: DISCONTINUED | OUTPATIENT
Start: 2024-06-06 | End: 2024-06-10 | Stop reason: HOSPADM

## 2024-06-06 RX ORDER — MEMANTINE HYDROCHLORIDE 10 MG/1
10 TABLET ORAL NIGHTLY
COMMUNITY

## 2024-06-06 RX ORDER — ATORVASTATIN CALCIUM 40 MG/1
40 TABLET, FILM COATED ORAL NIGHTLY
COMMUNITY

## 2024-06-06 RX ORDER — LEVOTHYROXINE SODIUM 112 UG/1
112 TABLET ORAL DAILY
Status: DISCONTINUED | OUTPATIENT
Start: 2024-06-07 | End: 2024-06-10 | Stop reason: HOSPADM

## 2024-06-06 RX ORDER — SODIUM CHLORIDE 9 MG/ML
75 INJECTION, SOLUTION INTRAVENOUS CONTINUOUS
Status: DISCONTINUED | OUTPATIENT
Start: 2024-06-06 | End: 2024-06-08

## 2024-06-06 RX ORDER — LOSARTAN POTASSIUM 50 MG/1
50 TABLET ORAL DAILY
Status: DISCONTINUED | OUTPATIENT
Start: 2024-06-07 | End: 2024-06-10 | Stop reason: HOSPADM

## 2024-06-06 RX ORDER — POLYETHYLENE GLYCOL 3350 17 G/17G
17 POWDER, FOR SOLUTION ORAL DAILY PRN
Status: DISCONTINUED | OUTPATIENT
Start: 2024-06-06 | End: 2024-06-10 | Stop reason: HOSPADM

## 2024-06-06 RX ORDER — METHOCARBAMOL 500 MG/1
2 TABLET, FILM COATED ORAL EVERY 8 HOURS PRN
COMMUNITY

## 2024-06-06 RX ORDER — CLOPIDOGREL BISULFATE 75 MG/1
75 TABLET ORAL DAILY
Status: DISCONTINUED | OUTPATIENT
Start: 2024-06-07 | End: 2024-06-10 | Stop reason: HOSPADM

## 2024-06-06 RX ORDER — BISACODYL 5 MG/1
5 TABLET, DELAYED RELEASE ORAL DAILY PRN
Status: DISCONTINUED | OUTPATIENT
Start: 2024-06-06 | End: 2024-06-10 | Stop reason: HOSPADM

## 2024-06-06 RX ORDER — MAGNESIUM OXIDE 400 MG/1
400 TABLET ORAL DAILY
Status: DISCONTINUED | OUTPATIENT
Start: 2024-06-07 | End: 2024-06-10 | Stop reason: HOSPADM

## 2024-06-06 RX ORDER — ZONISAMIDE 100 MG/1
100 CAPSULE ORAL DAILY
Status: DISCONTINUED | OUTPATIENT
Start: 2024-06-07 | End: 2024-06-10 | Stop reason: HOSPADM

## 2024-06-06 RX ADMIN — DONEPEZIL HYDROCHLORIDE 10 MG: 10 TABLET, FILM COATED ORAL at 22:21

## 2024-06-06 RX ADMIN — CEFTRIAXONE 2000 MG: 2 INJECTION, POWDER, FOR SOLUTION INTRAMUSCULAR; INTRAVENOUS at 17:40

## 2024-06-06 RX ADMIN — ATORVASTATIN CALCIUM 40 MG: 20 TABLET, FILM COATED ORAL at 22:21

## 2024-06-06 RX ADMIN — MEMANTINE HYDROCHLORIDE 10 MG: 10 TABLET, FILM COATED ORAL at 22:21

## 2024-06-06 RX ADMIN — SODIUM CHLORIDE 125 ML/HR: 9 INJECTION, SOLUTION INTRAVENOUS at 15:00

## 2024-06-06 RX ADMIN — LEVETIRACETAM 500 MG: 500 TABLET, FILM COATED ORAL at 22:21

## 2024-06-06 RX ADMIN — GABAPENTIN 400 MG: 400 CAPSULE ORAL at 22:21

## 2024-06-06 NOTE — ED NOTES
Pt arrives via EMS from [facility]. Staff states that pt was in the dining room when she became unresponsive. Staff took pt back to her room and then she became responsive. Pt complains of nausea.

## 2024-06-06 NOTE — ED PROVIDER NOTES
EMERGENCY DEPARTMENT ENCOUNTER    Room Number:  26/26  Date of encounter:  6/6/2024  PCP: Naya Mayfield APRN  Historian: Patient and spouse  Relevant information and history provided by sources other than the patient will be included below and in the ED Course.  Review of pertinent past medical records may also be included in record below and ED Course.    HPI:  Chief Complaint: Weakness and decrease in responsiveness  A complete HPI/ROS/PMH/PSH/SH/FH are unobtainable due to: Patient is alert and oriented but is a little forgetful.  She has a history of dementia.  History is obtained from the patient and spouse  Context: Bhavana Teixeira is a 63 y.o. female who presents to the ED c/o this is a patient that was sent from [facility].  Spouse and the facility has noted that she seems to be progressively getting weaker over the past week to week and a half.  She also has had periods of just being more tired and lethargic.  Today at lunch she seemed to be slumped over and less responsive.  Right now she seems to be doing better.  She is still not her normal self as she is more tired than normal and she is just more weak.  Spouse states that she has not ambulated with therapy he believes for over a week.  She has had a history of a stroke and chronic weakness on the right side.  She normally ambulates with a walker.  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  There is no report of any fevers or chills.  Patient denies any pain.  No chest pain denies shortness of breath.  She does report that she is gradually felt weaker over the past several days to week to 2 weeks.  There is no report of any vomiting but might of had a little bit of nausea or spit up or dry heaving today at lunch.  She has chronic urinary incontinence.  There is no obvious report of diarrhea.  No report of dysuria      Previous Episodes: History of weakness in the past.  Current Symptoms: See  above    MEDICAL HISTORY REVIEWED    This is a patient with a history of CVA with persistent weakness.  At best she can only ambulate short distances with a walker.  She also has a history of progressive neurologic disease called Cadasil she has a history of dementia and she is got chronic right hemiplegia and history of seizure disorder.  I did review her medicine list that she is on.    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) 12/12/2016    Cervical radiculopathy 07/08/2013    Degeneration of intervertebral disc of cervical region 12/12/2016    Depression 12/12/2016    Fracture of distal end of radius 09/18/2015    Static tremor 01/04/2013    Family history of colon cancer 02/19/2014    Gastroesophageal reflux disease 12/12/2016    Headache 12/12/2016    History of respiratory system disease 12/12/2016    History of total hysterectomy with bilateral salpingo-oophorectomy (BSO) 02/19/2014    Hypothyroidism 12/21/2012    Irritable bowel syndrome 12/12/2016    Adiposity 12/05/2014    Osteopenia 02/28/2014    Partial epilepsy with impairment of consciousness 01/04/2013    Encounter for preprocedural cardiovascular examination 12/05/2014    Simple renal cyst 11/26/2013    Lesion of vulva 02/19/2014    Prolapse 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 12/10/2014    Cerebrovascular accident 07/08/2013    Seizures 12/12/2016    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    Transient ischemic attack 11/08/2017    Hypernatremia 11/08/2017    Hyperglycemia 11/08/2017    DNR (do not resuscitate) 11/08/2017    Cerebrovascular accident (CVA) 01/06/2018    Obesity (BMI 30-39.9) 01/06/2018    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    Migraine 02/26/2019    Sleep apnea 02/26/2019     Resolved Ambulatory Problems     Diagnosis Date  Noted    Degeneration of intervertebral disc of lumbar region 01/13/2015     Past Medical History:   Diagnosis Date    Arthritis     Cervical spine disease     Degenerative joint disease (DJD) of lumbar spine     Seasonal allergies     Thyroid disease          PAST SURGICAL HISTORY  Past Surgical History:   Procedure Laterality Date    BACK SURGERY      CHOLECYSTECTOMY      HYSTERECTOMY           FAMILY HISTORY  Family History   Problem Relation Age of Onset    Migraines Mother     Migraines Father     Multiple sclerosis Father     Migraines Sister          SOCIAL HISTORY  Social History     Socioeconomic History    Marital status:    Tobacco Use    Smoking status: Never    Smokeless tobacco: Never   Substance and Sexual Activity    Alcohol use: Yes     Comment: social     Drug use: No    Sexual activity: Never         ALLERGIES  Amlodipine, Amlodipine besylate, Penicillins, Lisinopril, Metoclopramide, and Oxycodone        REVIEW OF SYSTEMS  Review of Systems     All systems reviewed and negative except for those discussed in HPI.       PHYSICAL EXAM    I have reviewed the triage vital signs and nursing notes.    ED Triage Vitals [06/06/24 1339]   Temp Heart Rate Resp BP SpO2   98.1 °F (36.7 °C) 70 16 139/90 93 %      Temp src Heart Rate Source Patient Position BP Location FiO2 (%)   Tympanic Monitor Lying -- --       GENERAL: Female that looks older than her stated age.  She appears a little tired and sleepy but is able to communicate and follow commands.  Vital signs on my initial evaluation have been reviewed.  Her O2 sat on my exam is 92% on room air.  It usually stays in the low 90 range.  She is afebrile with blood pressure and heart rate unremarkable  HENT: nares patent  Head/neck/ face are symmetric without gross deformity, signs of trauma, or swelling  EYES: no scleral icterus, no conjunctival pallor.  Extraocular muscles are intact.  No new visual impairment.  NECK: Supple, no meningismus  CV:  regular rhythm, regular rate with intact distal pulses.  RESPIRATORY: normal effort and no respiratory distress.  Decreased breath sounds in the bases bilaterally.  ABDOMEN: soft and nontender.  Morbidly obese.  MUSCULOSKELETAL: no deformity.  2+ edema to bilateral lower extremities.  Intact distal pulses that are equal strong and symmetric.  NEURO: alert to name and disoriented to her age and time.  She has no gross new focal weakness she has chronic right hemiplegia but is able to raise both arms off the bed.  SKIN: warm, dry    Vital signs and nursing notes reviewed.        LAB RESULTS  Recent Results (from the past 24 hour(s))   ECG 12 Lead Other; Progressive weakness    Collection Time: 06/06/24  2:16 PM   Result Value Ref Range    QT Interval 434 ms    QTC Interval 455 ms   Blood Gas, Arterial -    Collection Time: 06/06/24  2:48 PM    Specimen: Arterial Blood   Result Value Ref Range    Site Right Radial     Devan's Test Positive     pH, Arterial 7.377 7.350 - 7.450 pH units    pCO2, Arterial 45.1 (H) 35.0 - 45.0 mm Hg    pO2, Arterial 69.6 (L) 80.0 - 100.0 mm Hg    HCO3, Arterial 26.5 22.0 - 28.0 mmol/L    Base Excess, Arterial 0.8 0.0 - 2.0 mmol/L    O2 Saturation, Arterial 93.1 92.0 - 98.5 %    CO2 Content 27.9 (H) 23 - 27 mmol/L    Barometric Pressure for Blood Gas 742.3000 mmHg    Modality Room Air     Set Mech Resp Rate 18     Hemodilution No     Device Comment sat 91    Comprehensive Metabolic Panel    Collection Time: 06/06/24  3:01 PM    Specimen: Blood   Result Value Ref Range    Glucose 109 (H) 65 - 99 mg/dL    BUN 17 8 - 23 mg/dL    Creatinine 0.86 0.57 - 1.00 mg/dL    Sodium 143 136 - 145 mmol/L    Potassium 4.0 3.5 - 5.2 mmol/L    Chloride 108 (H) 98 - 107 mmol/L    CO2 27.0 22.0 - 29.0 mmol/L    Calcium 9.3 8.6 - 10.5 mg/dL    Total Protein 7.7 6.0 - 8.5 g/dL    Albumin 4.0 3.5 - 5.2 g/dL    ALT (SGPT) 21 1 - 33 U/L    AST (SGOT) 30 1 - 32 U/L    Alkaline Phosphatase 205 (H) 39 - 117 U/L     Total Bilirubin 0.2 0.0 - 1.2 mg/dL    Globulin 3.7 gm/dL    A/G Ratio 1.1 g/dL    BUN/Creatinine Ratio 19.8 7.0 - 25.0    Anion Gap 8.0 5.0 - 15.0 mmol/L    eGFR 75.1 >60.0 mL/min/1.73   Protime-INR    Collection Time: 06/06/24  3:01 PM    Specimen: Blood   Result Value Ref Range    Protime 14.0 11.7 - 14.2 Seconds    INR 1.05 0.90 - 1.10   High Sensitivity Troponin T    Collection Time: 06/06/24  3:01 PM    Specimen: Blood   Result Value Ref Range    HS Troponin T 13 <14 ng/L   Magnesium    Collection Time: 06/06/24  3:01 PM    Specimen: Blood   Result Value Ref Range    Magnesium 1.9 1.6 - 2.4 mg/dL   CBC Auto Differential    Collection Time: 06/06/24  3:01 PM    Specimen: Blood   Result Value Ref Range    WBC 6.07 3.40 - 10.80 10*3/mm3    RBC 5.08 3.77 - 5.28 10*6/mm3    Hemoglobin 13.4 12.0 - 15.9 g/dL    Hematocrit 42.7 34.0 - 46.6 %    MCV 84.1 79.0 - 97.0 fL    MCH 26.4 (L) 26.6 - 33.0 pg    MCHC 31.4 (L) 31.5 - 35.7 g/dL    RDW 14.6 12.3 - 15.4 %    RDW-SD 44.6 37.0 - 54.0 fl    MPV 11.0 6.0 - 12.0 fL    Platelets 223 140 - 450 10*3/mm3    Neutrophil % 63.9 42.7 - 76.0 %    Lymphocyte % 24.1 19.6 - 45.3 %    Monocyte % 8.4 5.0 - 12.0 %    Eosinophil % 3.0 0.3 - 6.2 %    Basophil % 0.3 0.0 - 1.5 %    Immature Grans % 0.3 0.0 - 0.5 %    Neutrophils, Absolute 3.88 1.70 - 7.00 10*3/mm3    Lymphocytes, Absolute 1.46 0.70 - 3.10 10*3/mm3    Monocytes, Absolute 0.51 0.10 - 0.90 10*3/mm3    Eosinophils, Absolute 0.18 0.00 - 0.40 10*3/mm3    Basophils, Absolute 0.02 0.00 - 0.20 10*3/mm3    Immature Grans, Absolute 0.02 0.00 - 0.05 10*3/mm3    nRBC 0.0 0.0 - 0.2 /100 WBC   Urinalysis With Microscopic If Indicated (No Culture) - Urine, Catheter    Collection Time: 06/06/24  3:15 PM    Specimen: Urine, Catheter   Result Value Ref Range    Color, UA Yellow Yellow, Straw    Appearance, UA Clear Clear    pH, UA 6.5 5.0 - 8.0    Specific Gravity, UA 1.023 1.005 - 1.030    Glucose, UA Negative Negative    Ketones, UA Trace  (A) Negative    Bilirubin, UA Negative Negative    Blood, UA Negative Negative    Protein, UA Negative Negative    Leuk Esterase, UA Moderate (2+) (A) Negative    Nitrite, UA Positive (A) Negative    Urobilinogen, UA 1.0 E.U./dL 0.2 - 1.0 E.U./dL   Urinalysis, Microscopic Only - Urine, Catheter    Collection Time: 06/06/24  3:15 PM    Specimen: Urine, Catheter   Result Value Ref Range    RBC, UA 0-2 None Seen, 0-2 /HPF    WBC, UA 21-50 (A) None Seen, 0-2 /HPF    Bacteria, UA 4+ (A) None Seen /HPF    Squamous Epithelial Cells, UA 0-2 None Seen, 0-2 /HPF    Hyaline Casts, UA 0-2 None Seen /LPF    Methodology Automated Microscopy        Ordered the above labs and independently reviewed the results.        RADIOLOGY  CT Head Without Contrast    Result Date: 6/6/2024  CT HEAD WITHOUT CONTRAST  HISTORY: Altered mental status, progressive weakness.  COMPARISON: Comparison is made to the CT examination 04/27/2023.  FINDINGS: The brain and ventricles are symmetrical. There is extensive small vessel ischemic disease. No focal area of decreased attenuation to suggest acute infarction is identified. Mild vascular calcification is noted. There is no evidence of hemorrhage, hydrocephalus or of abnormal extra-axial fluid.      No acute process is identified. Further evaluation could be performed with a MRI examination of the brain as indicated.    Radiation dose reduction techniques were utilized, including automated exposure control and exposure modulation based on body size.       XR Chest 1 View    Result Date: 6/6/2024  XR CHEST 1 VW-  HISTORY: Female who is 63 years-old, weakness  TECHNIQUE: Frontal view of the chest  COMPARISON: 9/20/2022  FINDINGS: The heart size is borderline. Pulmonary vasculature is unremarkable. No focal pulmonary consolidation, pleural effusion, or pneumothorax. No acute osseous process.      No focal pulmonary consolidation. Borderline heart size. Follow-up as clinical indications persist.  This  report was finalized on 6/6/2024 3:28 PM by Dr. Luis Alfredo Lagunas M.D on Workstation: [id number]       I ordered the above noted radiological studies. Reviewed by me and discussed with radiologist.  See dictation for official radiology interpretation.      PROCEDURES    Procedures      MEDICATIONS GIVEN IN ER    Medications   sodium chloride 0.9 % flush 10 mL (has no administration in time range)   sodium chloride 0.9 % infusion (125 mL/hr Intravenous New Bag 6/6/24 1500)   cefTRIAXone (ROCEPHIN) 2,000 mg in sodium chloride 0.9 % 100 mL MBP (has no administration in time range)   acetaminophen (TYLENOL) tablet 650 mg (has no administration in time range)   ondansetron ODT (ZOFRAN-ODT) disintegrating tablet 4 mg (has no administration in time range)     Or   ondansetron (ZOFRAN) injection 4 mg (has no administration in time range)   melatonin tablet 2.5 mg (has no administration in time range)   sennosides-docusate (PERICOLACE) 8.6-50 MG per tablet 2 tablet (has no administration in time range)     And   polyethylene glycol (MIRALAX) packet 17 g (has no administration in time range)     And   bisacodyl (DULCOLAX) EC tablet 5 mg (has no administration in time range)     And   bisacodyl (DULCOLAX) suppository 10 mg (has no administration in time range)         All labs have been independently reviewed by me.  All radiology studies have been reviewed by me and I discussed with radiologist dictating the report when indicated below.  All EKG's independently viewed and interpreted by me.  Discussion below represents my analysis of pertinent findings related to patient's condition, differential diagnosis, treatment plan and final disposition.        PROGRESS, DATA ANALYSIS, CONSULTS, AND MEDICAL DECISION MAKING    Differential diagnosis for altered mental status includes:  - vital sign abnormalities such as HTN encephalopathy, hypotension, hypoxemia, hypercarbia, heat stroke, or hypothermia  - toxic/metabolic pathology such  as hypoglycemia, DKA, hypo/hyper-natremia, thyroid storm, myxedema coma, medication side effect (either intentional or accidental)  - infectious etiology  - intracranial pathology such as stroke, seizure, intracranial mass, intracranial hemorrhage  - psychiatric pathology  I did clarify with the patient and the spouse.  This patient is a DO NOT RESUSCITATE.  No CPR or intubation if it became necessary.  Informed the patient and the spouse of the test that we will order.  All questions answered.      ED Course as of 06/06/24 1648   Thu Jun 06, 2024   1427 My own independent or potation of the EKG that was done at 2:16 PM reveals a rate of 66 it is sinus rhythm there is some intervention conduction delay with signs of Q waves in the inferior leads as well as diffuse nonspecific T waves  I did appreciate any obvious acute injury pattern QT looks unremarkable  Compared to her previous EKG that was done on 9/20/2022 and it looks fairly similar. [MM]   1551 Nitrite, UA(!): Positive [MM]   1551 Leukocytes, UA(!): Moderate (2+) [MM]   1551 Bacteria, UA(!): 4+ [MM]   1551 WBC, UA(!): 21-50 [MM]   1552 No acute disease seen on the chest x-ray. [MM]   1605 I did discuss the case with Dr. Dobson on for [facility].  Informed her of the patient's presenting symptoms and results of test.  She will follow-up with a CT scan of the head.  Again I do not believe there is any acute abnormality that I anticipate that I see on the CT of the head.  She has no new focal motor or sensory changes. [MM]   1611 I have reevaluated the patient talked with the patient and the spouse.  She is hemodynamically stable.  Overall her status is remained unchanged.  Informed them of the results of the tests and treatment plan.  All questions answered [MM]   1628 CT scan of the head report was reviewed from radiologist no acute active disease seen.  Please see complete dictated report from radiologist. 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  I answered all of the patient's questions.  The patient will be admitted to monitor bed at this time.  The patient is not hypotensive and is tolerating their current disease condition well enough for a monitored bed at this time.  The patient's current condition does not require intensive care treatment at this time.            DICTATED UTILIZING DRAGON DICTATION    Note Disclaimer: At [facility], we believe that sharing information builds trust and better relationships. You are receiving this note because you recently visited [facility]. It is possible you will see health information before a provider has talked with you about it. This kind of information can be easy to misunderstand. To help you fully understand what it means for your health, we urge you to discuss this note with your provider.       Silvio Quiroz MD  06/06/24 0413

## 2024-06-06 NOTE — ED NOTES
Nursing report ED to floor  Bhavana Teixeira  63 y.o.  female    HPI :  HPI (Adult)  Stated Reason for Visit: episdoe of unresponsive    Chief Complaint  Chief Complaint   Patient presents with    Nausea    Altered Mental Status       Admitting doctor:   Angelina Dobson MD    Admitting diagnosis:   The primary encounter diagnosis was Altered mental status, unspecified altered mental status type. 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    Code status:   Current Code Status       Date Active Code Status Order ID Comments User Context       6/6/2024 1631 CPR (Attempt to Resuscitate) [id number]  Angelina Dobson MD ED        Question Answer    Code Status (Patient has no pulse and is not breathing) CPR (Attempt to Resuscitate)    Medical Interventions (Patient has pulse or is breathing) Full                    Allergies:   Amlodipine, Amlodipine besylate, Penicillins, Lisinopril, Metoclopramide, and Oxycodone    Isolation:   No active isolations    Intake and Output  No intake or output data in the 24 hours ending 06/06/24 1636    Weight:   There were no vitals filed for this visit.    Most recent vitals:   Vitals:    06/06/24 1339 06/06/24 1450   BP: 139/90    Patient Position: Lying    Pulse: 70 64   Resp: 16 18   Temp: 98.1 °F (36.7 °C)    TempSrc: Tympanic    SpO2: 93% 93%       Active LDAs/IV Access:   Lines, Drains & Airways       Active LDAs       Name Placement date Placement time Site Days    Peripheral IV 06/06/24 1457 Anterior;Distal;Right Forearm 06/06/24  1457  Forearm  less than 1                    Labs (abnormal labs have a star):   Labs Reviewed   COMPREHENSIVE METABOLIC PANEL - Abnormal; Notable for the following components:       Result Value    Glucose 109 (*)     Chloride 108 (*)     Alkaline Phosphatase 205 (*)     All other components within normal limits     Narrative:     GFR Normal >60  Chronic Kidney Disease <60  Kidney Failure <15     URINALYSIS W/ MICROSCOPIC IF INDICATED (NO CULTURE) - Abnormal; Notable for the following components:    Ketones, UA Trace (*)     Leuk Esterase, UA Moderate (2+) (*)     Nitrite, UA Positive (*)     All other components within normal limits   CBC WITH AUTO DIFFERENTIAL - Abnormal; Notable for the following components:    MCH 26.4 (*)     MCHC 31.4 (*)     All other components within normal limits   BLOOD GAS, ARTERIAL - Abnormal; Notable for the following components:    pCO2, Arterial 45.1 (*)     pO2, Arterial 69.6 (*)     CO2 Content 27.9 (*)     All other components within normal limits   URINALYSIS, MICROSCOPIC ONLY - Abnormal; Notable for the following components:    WBC, UA 21-50 (*)     Bacteria, UA 4+ (*)     All other components within normal limits   PROTIME-INR - Normal   TROPONIN - Normal    Narrative:     High Sensitive Troponin T Reference Range:  <14.0 ng/L- Negative Female for AMI  <22.0 ng/L- Negative Male for AMI  >=14 - Abnormal Female indicating possible myocardial injury.  >=22 - Abnormal Male indicating possible myocardial injury.   Clinicians would have to utilize clinical acumen, EKG, Troponin, and serial changes to determine if it is an Acute Myocardial Infarction or myocardial injury due to an underlying chronic condition.        MAGNESIUM - Normal   BLOOD CULTURE   BLOOD CULTURE   URINE CULTURE   BLOOD GAS, ARTERIAL   HIGH SENSITIVITIY TROPONIN T 2HR   LACTIC ACID, PLASMA   CBC AND DIFFERENTIAL    Narrative:     The following orders were created for panel order CBC & Differential.  Procedure                               Abnormality         Status                     ---------                               -----------         ------                     CBC Auto Differential[[id number]]        Abnormal            Final result                 Please view results for these tests on the individual orders.        EKG:   ECG 12 Lead Other; Progressive weakness   Final Result   HEART RATE= 66  bpm   RR Interval= 909  ms   MD Interval= 156  ms   P Horizontal Axis= -9  deg   P Front Axis= 32  deg   QRSD Interval= 111  ms   QT Interval= 434  ms   QTcB= 455  ms   QRS Axis= -33  deg   T Wave Axis= -1  deg   - ABNORMAL ECG -   Sinus rhythm   Inferior infarct, old   Probable anterior infarct, age indeterminate   Lateral leads are also involved   When compared with ECG of 20-Sep-2022 12:16:54,   No significant change   Electronically Signed By: Clay Maguire ([facility]) 06-Jun-2024 15:04:47   Date and Time of Study: 2024-06-06 14:16:56          Meds given in ED:   Medications   sodium chloride 0.9 % flush 10 mL (has no administration in time range)   sodium chloride 0.9 % infusion (125 mL/hr Intravenous New Bag 6/6/24 1500)   cefTRIAXone (ROCEPHIN) 2,000 mg in sodium chloride 0.9 % 100 mL MBP (has no administration in time range)   acetaminophen (TYLENOL) tablet 650 mg (has no administration in time range)   ondansetron ODT (ZOFRAN-ODT) disintegrating tablet 4 mg (has no administration in time range)     Or   ondansetron (ZOFRAN) injection 4 mg (has no administration in time range)   melatonin tablet 2.5 mg (has no administration in time range)   sennosides-docusate (PERICOLACE) 8.6-50 MG per tablet 2 tablet (has no administration in time range)     And   polyethylene glycol (MIRALAX) packet 17 g (has no administration in time range)     And   bisacodyl (DULCOLAX) EC tablet 5 mg (has no administration in time range)     And   bisacodyl (DULCOLAX) suppository 10 mg (has no administration in time range)       Imaging results:  CT Head Without Contrast    Result Date: 6/6/2024  No acute process is identified. Further evaluation could be performed with a MRI examination of the brain as indicated.    Radiation dose reduction techniques were utilized, including automated exposure control and exposure modulation based on body size.       XR  Chest 1 View    Result Date: 6/6/2024  No focal pulmonary consolidation. Borderline heart size. Follow-up as clinical indications persist.  This report was finalized on 6/6/2024 3:28 PM by Dr. Luis Alfredo Lagunas M.D on Workstation: [id number]       Ambulatory status:   - bedrest    Social issues:   Social History     Socioeconomic History    Marital status:    Tobacco Use    Smoking status: Never    Smokeless tobacco: Never   Substance and Sexual Activity    Alcohol use: Yes     Comment: social     Drug use: No    Sexual activity: Never       Peripheral Neurovascular       Neuro Cognitive       Learning       Respiratory       Abdominal Pain       Pain Assessments  Pain (Adult)  (0-10) Pain Rating: Rest: 0  (0-10) Pain Rating: Activity: 0    NIH Stroke Scale       Kailee Darden RN  06/06/24 16:36 EDT

## 2024-06-06 NOTE — ED NOTES
Nursing report ED to floor  Bhavana Teixeira  63 y.o.  female    HPI :  HPI (Adult)  Stated Reason for Visit: episdoe of unresponsive    Chief Complaint  Chief Complaint   Patient presents with    Nausea    Altered Mental Status       Admitting doctor:   Angelina Dobson MD    Admitting diagnosis:   The primary encounter diagnosis was Altered mental status, unspecified altered mental status type. 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    Code status:   Current Code Status       Date Active Code Status Order ID Comments User Context       6/6/2024 1631 CPR (Attempt to Resuscitate) [id number]  Angelina Dobson MD ED        Question Answer    Code Status (Patient has no pulse and is not breathing) CPR (Attempt to Resuscitate)    Medical Interventions (Patient has pulse or is breathing) Full                    Allergies:   Amlodipine, Amlodipine besylate, Penicillins, Lisinopril, Metoclopramide, and Oxycodone    Isolation:   No active isolations    Intake and Output  No intake or output data in the 24 hours ending 06/06/24 1636    Weight:   There were no vitals filed for this visit.    Most recent vitals:   Vitals:    06/06/24 1339 06/06/24 1450   BP: 139/90    Patient Position: Lying    Pulse: 70 64   Resp: 16 18   Temp: 98.1 °F (36.7 °C)    TempSrc: Tympanic    SpO2: 93% 93%       Active LDAs/IV Access:   Lines, Drains & Airways       Active LDAs       Name Placement date Placement time Site Days    Peripheral IV 06/06/24 1457 Anterior;Distal;Right Forearm 06/06/24  1457  Forearm  less than 1                    Labs (abnormal labs have a star):   Labs Reviewed   COMPREHENSIVE METABOLIC PANEL - Abnormal; Notable for the following components:       Result Value    Glucose 109 (*)     Chloride 108 (*)     Alkaline Phosphatase 205 (*)     All other components within normal limits     Narrative:     GFR Normal >60  Chronic Kidney Disease <60  Kidney Failure <15     URINALYSIS W/ MICROSCOPIC IF INDICATED (NO CULTURE) - Abnormal; Notable for the following components:    Ketones, UA Trace (*)     Leuk Esterase, UA Moderate (2+) (*)     Nitrite, UA Positive (*)     All other components within normal limits   CBC WITH AUTO DIFFERENTIAL - Abnormal; Notable for the following components:    MCH 26.4 (*)     MCHC 31.4 (*)     All other components within normal limits   BLOOD GAS, ARTERIAL - Abnormal; Notable for the following components:    pCO2, Arterial 45.1 (*)     pO2, Arterial 69.6 (*)     CO2 Content 27.9 (*)     All other components within normal limits   URINALYSIS, MICROSCOPIC ONLY - Abnormal; Notable for the following components:    WBC, UA 21-50 (*)     Bacteria, UA 4+ (*)     All other components within normal limits   PROTIME-INR - Normal   TROPONIN - Normal    Narrative:     High Sensitive Troponin T Reference Range:  <14.0 ng/L- Negative Female for AMI  <22.0 ng/L- Negative Male for AMI  >=14 - Abnormal Female indicating possible myocardial injury.  >=22 - Abnormal Male indicating possible myocardial injury.   Clinicians would have to utilize clinical acumen, EKG, Troponin, and serial changes to determine if it is an Acute Myocardial Infarction or myocardial injury due to an underlying chronic condition.        MAGNESIUM - Normal   BLOOD CULTURE   BLOOD CULTURE   URINE CULTURE   BLOOD GAS, ARTERIAL   HIGH SENSITIVITIY TROPONIN T 2HR   LACTIC ACID, PLASMA   CBC AND DIFFERENTIAL    Narrative:     The following orders were created for panel order CBC & Differential.  Procedure                               Abnormality         Status                     ---------                               -----------         ------                     CBC Auto Differential[[id number]]        Abnormal            Final result                 Please view results for these tests on the individual orders.        EKG:   ECG 12 Lead Other; Progressive weakness   Final Result   HEART RATE= 66  bpm   RR Interval= 909  ms   ND Interval= 156  ms   P Horizontal Axis= -9  deg   P Front Axis= 32  deg   QRSD Interval= 111  ms   QT Interval= 434  ms   QTcB= 455  ms   QRS Axis= -33  deg   T Wave Axis= -1  deg   - ABNORMAL ECG -   Sinus rhythm   Inferior infarct, old   Probable anterior infarct, age indeterminate   Lateral leads are also involved   When compared with ECG of 20-Sep-2022 12:16:54,   No significant change   Electronically Signed By: Clay Maguire ([facility]) 06-Jun-2024 15:04:47   Date and Time of Study: 2024-06-06 14:16:56          Meds given in ED:   Medications   sodium chloride 0.9 % flush 10 mL (has no administration in time range)   sodium chloride 0.9 % infusion (125 mL/hr Intravenous New Bag 6/6/24 1500)   cefTRIAXone (ROCEPHIN) 2,000 mg in sodium chloride 0.9 % 100 mL MBP (has no administration in time range)   acetaminophen (TYLENOL) tablet 650 mg (has no administration in time range)   ondansetron ODT (ZOFRAN-ODT) disintegrating tablet 4 mg (has no administration in time range)     Or   ondansetron (ZOFRAN) injection 4 mg (has no administration in time range)   melatonin tablet 2.5 mg (has no administration in time range)   sennosides-docusate (PERICOLACE) 8.6-50 MG per tablet 2 tablet (has no administration in time range)     And   polyethylene glycol (MIRALAX) packet 17 g (has no administration in time range)     And   bisacodyl (DULCOLAX) EC tablet 5 mg (has no administration in time range)     And   bisacodyl (DULCOLAX) suppository 10 mg (has no administration in time range)       Imaging results:  CT Head Without Contrast    Result Date: 6/6/2024  No acute process is identified. Further evaluation could be performed with a MRI examination of the brain as indicated.    Radiation dose reduction techniques were utilized, including automated exposure control and exposure modulation based on body size.       XR  Chest 1 View    Result Date: 6/6/2024  No focal pulmonary consolidation. Borderline heart size. Follow-up as clinical indications persist.  This report was finalized on 6/6/2024 3:28 PM by Dr. Luis Alfredo Lagunas M.D on Workstation: [id number]       Ambulatory status:   Full assist    Social issues:   Social History     Socioeconomic History    Marital status:    Tobacco Use    Smoking status: Never    Smokeless tobacco: Never   Substance and Sexual Activity    Alcohol use: Yes     Comment: social     Drug use: No    Sexual activity: Never       Peripheral Neurovascular       Neuro Cognitive       Learning       Respiratory       Abdominal Pain       Pain Assessments  Pain (Adult)  (0-10) Pain Rating: Rest: 0  (0-10) Pain Rating: Activity: 0    NIH Stroke Scale       Essence Jimenez RN  06/06/24 16:36 EDT

## 2024-06-06 NOTE — PROGRESS NOTES
Clinical Pharmacy Services: Medication History    Bhavana Teixeira is a 63 y.o. female presenting to [facility] for   Chief Complaint   Patient presents with    Nausea    Altered Mental Status       She  has a past medical history of Arthritis, Cervical spine disease, Degenerative joint disease (DJD) of lumbar spine, Depression, Migraine, Partial epilepsy with impairment of consciousness (1/4/2013), Seasonal allergies, Seizures, Sleep apnea, and Thyroid disease.    Allergies as of 06/06/2024 - Reviewed 06/06/2024   Allergen Reaction Noted    Amlodipine Swelling 12/12/2016    Amlodipine besylate Swelling 12/12/2016    Penicillins Hives 12/12/2016    Lisinopril Other (See Comments) 12/12/2016    Metoclopramide Other (See Comments) 12/12/2016    Oxycodone Nausea Only 07/17/2017       Medication information was obtained from: prison Paperwork   Pharmacy and Phone Number:     Prior to Admission Medications       Prescriptions Last Dose Informant Patient Reported? Taking?    acetaminophen (TYLENOL) 500 MG tablet  Nursing Home Yes Yes    Take 2 tablets by mouth 2 (Two) Times a Day.    atorvastatin (LIPITOR) 40 MG tablet  Nursing Home Yes Yes    Take 1 tablet by mouth Every Night.    clopidogrel (PLAVIX) 75 MG tablet  Nursing Home Yes Yes    Take 1 tablet by mouth Daily.    donepezil (ARICEPT) 10 MG tablet  Nursing Home No Yes    Take 1 tablet by mouth Every Night.    FLUoxetine (PROzac) 20 MG capsule  Nursing Home Yes Yes    Take 1 capsule by mouth Daily.    gabapentin (NEURONTIN) 400 MG capsule  Nursing Home No Yes    Take 1 capsule by mouth 3 (Three) Times a Day.    lansoprazole (PREVACID SOLUTAB) 30 MG Tablet Delayed Release Dispersible disintegrating tablet  Nursing Home Yes Yes    Take 15 mg by mouth Daily.    levETIRAcetam (KEPPRA) 500 MG tablet  Nursing Home No Yes    Take 0.5 tablets by mouth Every 12 (Twelve) Hours.    Patient taking differently:  Take 1 tablet by mouth Every 12 (Twelve)  Hours.    levothyroxine (SYNTHROID, LEVOTHROID) 112 MCG tablet  Nursing Home Yes Yes    Take 1 tablet by mouth Daily.    loperamide (IMODIUM) 2 MG capsule  Nursing Home Yes Yes    Take 1 capsule by mouth Every 8 (Eight) Hours As Needed for Diarrhea.    losartan (COZAAR) 50 MG tablet  Nursing Home Yes Yes    Take 1 tablet by mouth Daily.    magnesium oxide (MAG-OX) 400 MG tablet  Nursing Home Yes Yes    Take 1 tablet by mouth Daily.    memantine (NAMENDA) 10 MG tablet  Nursing Home Yes Yes    Take 1 tablet by mouth Every Night.    memantine (NAMENDA) 5 MG tablet  Nursing Home No Yes    Take 1 tablet by mouth 2 (Two) Times a Day.    Patient taking differently:  Take 1 tablet by mouth Every Morning.    metFORMIN (GLUCOPHAGE) 500 MG tablet  Nursing Home Yes Yes    Take 1 tablet by mouth Daily.    methocarbamol (ROBAXIN) 500 MG tablet  Nursing Home Yes Yes    Take 2 tablets by mouth Every 8 (Eight) Hours As Needed for Muscle Spasms.    Multiple Vitamins-Minerals (MULTIVITAMIN & MINERAL PO)  Nursing Home Yes Yes    Take 1 tablet by mouth Daily.    zonisamide (ZONEGRAN) 100 MG capsule  Nursing Home No Yes    Take 3 capsules by mouth Every Night.    Patient taking differently:  Take 1 capsule by mouth Daily.    aspirin  MG tablet   No No    Take 1 tablet by mouth Daily.    atorvastatin (LIPITOR) 20 MG tablet   No No    TAKE 1 TABLET BY MOUTH EVERY DAY    Coenzyme Q10 400 MG capsule   Yes No    Take 1 capsule by mouth Daily.    conjugated estrogens (PREMARIN) 0.625 MG/GM vaginal cream  Spouse/Significant Other Yes No    Insert 0.5 g into the vagina 1 (One) Time Per Week.    cyanocobalamin 1000 MCG/ML injection  Spouse/Significant Other Yes No    Inject 1 mL into the appropriate muscle as directed by prescriber Every 28 (Twenty-Eight) Days.    fluticasone (FLONASE) 50 MCG/ACT nasal spray  Spouse/Significant Other Yes No    2 sprays into the nostril(s) as directed by provider Daily.    meloxicam (MOBIC) 15 MG tablet   Self Yes No    Take 1 tablet by mouth Daily.    Mirabegron ER (MYRBETRIQ) 50 MG tablet sustained-release 24 hour 24 hr tablet  Spouse/Significant Other Yes No    Take 50 mg by mouth Daily.    sulfamethoxazole-trimethoprim (BACTRIM DS,SEPTRA DS) 800-160 MG per tablet   No No    Take 1 tablet by mouth 2 (Two) Times a Day. Drink plenty of water    triamterene-hydrochlorothiazide (MAXZIDE-25) 37.5-25 MG per tablet   Yes No    Take 1 tablet by mouth.              Medication notes:     This medication list is complete to the best of my knowledge as of 6/6/2024    Please call if questions.    Rick Valenzuela  Medication History Technician   [phone]    6/6/2024 16:24 EDT

## 2024-06-07 ENCOUNTER — APPOINTMENT (OUTPATIENT)
Dept: MRI IMAGING | Facility: HOSPITAL | Age: 64
DRG: 312 | End: 2024-06-07
Payer: MEDICARE

## 2024-06-07 PROBLEM — R55 SYNCOPE AND COLLAPSE: Status: ACTIVE | Noted: 2024-06-07

## 2024-06-07 PROBLEM — Z86.73 HISTORY OF CVA (CEREBROVASCULAR ACCIDENT): Status: ACTIVE | Noted: 2024-06-07

## 2024-06-07 PROBLEM — N39.0 ACUTE UTI: Status: ACTIVE | Noted: 2024-06-07

## 2024-06-07 LAB
ANION GAP SERPL CALCULATED.3IONS-SCNC: 9.5 MMOL/L (ref 5–15)
BUN SERPL-MCNC: 13 MG/DL (ref 8–23)
BUN/CREAT SERPL: 22.4 (ref 7–25)
CALCIUM SPEC-SCNC: 8.9 MG/DL (ref 8.6–10.5)
CHLORIDE SERPL-SCNC: 108 MMOL/L (ref 98–107)
CO2 SERPL-SCNC: 23.5 MMOL/L (ref 22–29)
CREAT SERPL-MCNC: 0.58 MG/DL (ref 0.57–1)
DEPRECATED RDW RBC AUTO: 43.6 FL (ref 37–54)
EGFRCR SERPLBLD CKD-EPI 2021: 100.6 ML/MIN/1.73
ERYTHROCYTE [DISTWIDTH] IN BLOOD BY AUTOMATED COUNT: 14.4 % (ref 12.3–15.4)
GLUCOSE SERPL-MCNC: 91 MG/DL (ref 65–99)
HCT VFR BLD AUTO: 41.1 % (ref 34–46.6)
HGB BLD-MCNC: 12.9 G/DL (ref 12–15.9)
MCH RBC QN AUTO: 26.1 PG (ref 26.6–33)
MCHC RBC AUTO-ENTMCNC: 31.4 G/DL (ref 31.5–35.7)
MCV RBC AUTO: 83 FL (ref 79–97)
PLATELET # BLD AUTO: 200 10*3/MM3 (ref 140–450)
PMV BLD AUTO: 10.7 FL (ref 6–12)
POTASSIUM SERPL-SCNC: 3.8 MMOL/L (ref 3.5–5.2)
RBC # BLD AUTO: 4.95 10*6/MM3 (ref 3.77–5.28)
SODIUM SERPL-SCNC: 141 MMOL/L (ref 136–145)
WBC NRBC COR # BLD AUTO: 6.79 10*3/MM3 (ref 3.4–10.8)

## 2024-06-07 PROCEDURE — 97166 OT EVAL MOD COMPLEX 45 MIN: CPT

## 2024-06-07 PROCEDURE — 70551 MRI BRAIN STEM W/O DYE: CPT

## 2024-06-07 PROCEDURE — 97162 PT EVAL MOD COMPLEX 30 MIN: CPT

## 2024-06-07 PROCEDURE — 99222 1ST HOSP IP/OBS MODERATE 55: CPT | Performed by: PSYCHIATRY & NEUROLOGY

## 2024-06-07 PROCEDURE — 97530 THERAPEUTIC ACTIVITIES: CPT

## 2024-06-07 PROCEDURE — 85027 COMPLETE CBC AUTOMATED: CPT | Performed by: INTERNAL MEDICINE

## 2024-06-07 PROCEDURE — 25810000003 SODIUM CHLORIDE 0.9 % SOLUTION: Performed by: INTERNAL MEDICINE

## 2024-06-07 PROCEDURE — 25010000002 CEFTRIAXONE PER 250 MG: Performed by: INTERNAL MEDICINE

## 2024-06-07 PROCEDURE — 80048 BASIC METABOLIC PNL TOTAL CA: CPT | Performed by: INTERNAL MEDICINE

## 2024-06-07 RX ADMIN — FLUOXETINE HYDROCHLORIDE 20 MG: 20 CAPSULE ORAL at 09:20

## 2024-06-07 RX ADMIN — LEVETIRACETAM 500 MG: 500 TABLET, FILM COATED ORAL at 23:25

## 2024-06-07 RX ADMIN — LOSARTAN POTASSIUM 50 MG: 50 TABLET, FILM COATED ORAL at 09:20

## 2024-06-07 RX ADMIN — ZONISAMIDE 100 MG: 100 CAPSULE ORAL at 09:21

## 2024-06-07 RX ADMIN — GABAPENTIN 400 MG: 400 CAPSULE ORAL at 09:28

## 2024-06-07 RX ADMIN — LEVETIRACETAM 500 MG: 500 TABLET, FILM COATED ORAL at 09:21

## 2024-06-07 RX ADMIN — GABAPENTIN 400 MG: 400 CAPSULE ORAL at 23:25

## 2024-06-07 RX ADMIN — ATORVASTATIN CALCIUM 40 MG: 20 TABLET, FILM COATED ORAL at 23:25

## 2024-06-07 RX ADMIN — MEMANTINE HYDROCHLORIDE 5 MG: 10 TABLET, FILM COATED ORAL at 09:21

## 2024-06-07 RX ADMIN — GABAPENTIN 400 MG: 400 CAPSULE ORAL at 17:17

## 2024-06-07 RX ADMIN — DONEPEZIL HYDROCHLORIDE 10 MG: 10 TABLET, FILM COATED ORAL at 23:25

## 2024-06-07 RX ADMIN — MEMANTINE HYDROCHLORIDE 10 MG: 10 TABLET, FILM COATED ORAL at 23:24

## 2024-06-07 RX ADMIN — CEFTRIAXONE SODIUM 1000 MG: 1 INJECTION, POWDER, FOR SOLUTION INTRAMUSCULAR; INTRAVENOUS at 17:17

## 2024-06-07 RX ADMIN — LEVOTHYROXINE SODIUM 112 MCG: 112 TABLET ORAL at 09:20

## 2024-06-07 RX ADMIN — MAGNESIUM OXIDE 400 MG (241.3 MG MAGNESIUM) TABLET 400 MG: TABLET at 09:20

## 2024-06-07 RX ADMIN — CLOPIDOGREL BISULFATE 75 MG: 75 TABLET, FILM COATED ORAL at 09:28

## 2024-06-07 RX ADMIN — SODIUM CHLORIDE 75 ML/HR: 9 INJECTION, SOLUTION INTRAVENOUS at 12:03

## 2024-06-07 RX ADMIN — LANSOPRAZOLE 15 MG: 15 TABLET, ORALLY DISINTEGRATING ORAL at 09:21

## 2024-06-07 NOTE — THERAPY EVALUATION
Patient Name: Bhavana Teixeira  : 1960    MRN: [id number]                              Today's Date: 2024       Admit Date: 2024    Visit Dx:     ICD-10-CM ICD-9-CM   1. Altered mental status, unspecified altered mental status type  R41.82 780.97   2. Acute UTI  N39.0 599.0   3. 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)    Cervical radiculopathy    Degeneration of intervertebral disc of cervical region    Depression    Fracture of distal end of radius    Static tremor    Family history of colon cancer    Gastroesophageal reflux disease    Headache    History of respiratory system disease    History of total hysterectomy with bilateral salpingo-oophorectomy (BSO)    Hypothyroidism    Irritable bowel syndrome    Adiposity    Osteopenia    Partial epilepsy with impairment of consciousness    Encounter for preprocedural cardiovascular examination    Simple renal cyst    Lesion of vulva    Prolapse 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    Cerebrovascular accident    Seizures    Hemispheric carotid artery syndrome    Transient ischemic attack    Hypernatremia    Hyperglycemia    DNR (do not resuscitate)    Cerebrovascular accident (CVA)    Obesity (BMI 30-39.9)    Partial right third nerve palsy    Migraine    Sleep apnea    Altered mental status     Past Medical History:   Diagnosis Date    Arthritis     Cervical spine disease     Degenerative joint disease (DJD) of lumbar spine     Depression     Migraine     Partial epilepsy with impairment of consciousness 2013    Seasonal allergies     Seizures     Sleep apnea     Thyroid disease      Past Surgical History:   Procedure Laterality Date    BACK SURGERY      CHOLECYSTECTOMY       HYSTERECTOMY        General Information       Row Name 06/07/24 0908          Physical Therapy Time and Intention    Document Type evaluation  -MS     Mode of Treatment physical therapy;occupational therapy;co-treatment  Activity limitation due to fatigue/weakness; Working on functional balance and strengthening while performing ADL's  -MS       Row Name 06/07/24 0908          General Information    Patient Profile Reviewed yes  -MS     Prior Level of Function --  Primary means of mobility is via W/C but pt. reports she was able to stand pivot with assist x 1  to transfer to/from W/C  -MS     Existing Precautions/Restrictions fall   Exit alarm  -MS     Barriers to Rehab --  Slow to respond at times  -MS       Row Name 06/07/24 0908          Cognition    Orientation Status (Cognition) oriented to;person;place  Needs orientation to Month but does not the year  -MS       Row Name 06/07/24 0908          Safety Issues, Functional Mobility    Comment, Safety Issues/Impairments (Mobility) Gait belt used for safety.  -MS               User Key  (r) = Recorded By, (t) = Taken By, (c) = Cosigned By      Initials Name Provider Type    MS Philipp Jake JACE, PT Physical Therapist                   Mobility       Row Name 06/07/24 0909          Bed Mobility    Bed Mobility supine-sit;sit-supine  -MS     Supine-Sit [place] (Bed Mobility) maximum assist (25% patient effort)  -MS     Sit-Supine [place] (Bed Mobility) maximum assist (25% patient effort);2 person assist  -MS     Comment, (Bed Mobility) Once sitting EOB, pt. requires Min. assist x 1 for dynamic sitting balance.  -MS       Row Name 06/07/24 0909          Sit-Stand Transfer    Sit-Stand [place] (Transfers) maximum assist (25% patient effort);2 person assist  -MS     Assistive Device (Sit-Stand Transfers) --  HHA x 2  -MS     Comment, (Sit-Stand Transfer) Bilateral feet blocked for safety.  Pt. began to have a bowel movement with upright mobility.   Nursing notified.  -MS               User Key  (r) = Recorded By, (t) = Taken By, (c) = Cosigned By      Initials Name Provider Type    Jake Linares JACE, PT Physical Therapist                   Obj/Interventions       Row Name 06/07/24 0910          Range of Motion Comprehensive    Comment, General Range of Motion BLE (Imp. 25%)  -MS       Row Name 06/07/24 0910          Strength Comprehensive (MMT)    Comment, General Manual Muscle Testing (MMT) Assessment BLE (3-/5)  -MS       Row Name 06/07/24 0910          Motor Skills    Therapeutic Exercise --  BLE ther. ex. program x 5 reps completed (Hip Flexion, LAQ's)  -MS               User Key  (r) = Recorded By, (t) = Taken By, (c) = Cosigned By      Initials Name Provider Type    Jake Linares JACE, PT Physical Therapist                   Goals/Plan       Row Name 06/07/24 0911          Bed Mobility Goal 1 (PT)    Activity/Assistive Device (Bed Mobility Goal 1, PT) bed mobility activities, all  -MS     [place] Level/Cues Needed (Bed Mobility Goal 1, PT) minimum assist (75% or more patient effort)  -MS     Time Frame (Bed Mobility Goal 1, PT) long term goal (LTG);1 week  -MS       Row Name 06/07/24 0911          Transfer Goal 1 (PT)    Activity/Assistive Device (Transfer Goal 1, PT) transfers, all;sit-to-stand/stand-to-sit;bed-to-chair/chair-to-bed  With HHA or AAD  -MS     [place] Level/Cues Needed (Transfer Goal 1, PT) minimum assist (75% or more patient effort)  -MS     Time Frame (Transfer Goal 1, PT) long term goal (LTG);1 week  -MS       Row Name 06/07/24 0911          Problem Specific Goal 1 (PT)    Problem Specific Goal 1 (PT) Pt. will perform dynamic sitting balance with SBA x 1  -MS     Time Frame (Problem Specific Goal 1, PT) long-term goal (LTG);1 week  -MS       Row Name 06/07/24 0911          Therapy Assessment/Plan (PT)    Planned Therapy Interventions (PT) balance training;bed mobility training;gait training;home exercise  program;patient/family education;postural re-education;transfer training;strengthening;ROM (range of motion)  -MS               User Key  (r) = Recorded By, (t) = Taken By, (c) = Cosigned By      Initials Name Provider Type    Jake Linares, PT Physical Therapist                   Clinical Impression       Row Name 06/07/24 0911          Pain    Pretreatment Pain Rating 0/10 - no pain  -MS     Posttreatment Pain Rating 0/10 - no pain  -MS       Row Name 06/07/24 0911          Plan of Care Review    Plan of Care Reviewed With patient  -MS       Row Name 06/07/24 0911          Therapy Assessment/Plan (PT)    Rehab Potential (PT) good, to achieve stated therapy goals  -MS     Criteria for Skilled Interventions Met (PT) skilled treatment is necessary  -MS     Therapy Frequency (PT) 6 times/wk  -MS       Row Name 06/07/24 0911          Positioning and Restraints    Pre-Treatment Position in bed  -MS     Post Treatment Position bed  -MS     In Bed notified nsg;supine;call light within reach;encouraged to call for assist;exit alarm on  All lines intact.  -MS               User Key  (r) = Recorded By, (t) = Taken By, (c) = Cosigned By      Initials Name Provider Type    Jake Linares, PT Physical Therapist                   Outcome Measures       Row Name 06/07/24 0912          How much help from another person do you currently need...    Turning from your back to your side while in flat bed without using bedrails? 2  -MS     Moving from lying on back to sitting on the side of a flat bed without bedrails? 2  -MS     Moving to and from a bed to a chair (including a wheelchair)? 2  -MS     Standing up from a chair using your arms (e.g., wheelchair, bedside chair)? 2  -MS     Climbing 3-5 steps with a railing? 1  -MS     To walk in hospital room? 1  -MS     AM-PAC 6 Clicks Score (PT) 10  -MS     Highest Level of Mobility Goal 4 --> Transfer to chair/commode  -MS       Row Name 06/07/24 0912          Functional  Assessment    Outcome Measure Options AM-PAC 6 Clicks Basic Mobility (PT)  -MS               User Key  (r) = Recorded By, (t) = Taken By, (c) = Cosigned By      Initials Name Provider Type    Jake Linares, PT Physical Therapist                                 Physical Therapy Education       Title: PT OT SLP Therapies (Done)       Topic: Physical Therapy (Done)       Point: Mobility training (Done)       Learning Progress Summary             Patient Acceptance, E,D, VU,NR by MS at 6/7/2024 0912                         Point: Home exercise program (Done)       Learning Progress Summary             Patient Acceptance, E,D, VU,NR by MS at 6/7/2024 0912                         Point: Body mechanics (Done)       Learning Progress Summary             Patient Acceptance, E,D, VU,NR by MS at 6/7/2024 0912                         Point: Precautions (Done)       Learning Progress Summary             Patient Acceptance, E,D, VU,NR by MS at 6/7/2024 0912                                         User Key       Initials Effective Dates Name Provider Type Discipline    MS 06/16/21 -  Jake Segal PT Physical Therapist PT                  PT Recommendation and Plan  Planned Therapy Interventions (PT): balance training, bed mobility training, gait training, home exercise program, patient/family education, postural re-education, transfer training, strengthening, ROM (range of motion)  Plan of Care Reviewed With: patient     Time Calculation:         PT Charges       Row Name 06/07/24 0915             Time Calculation    Start Time 0820  -MS      Stop Time 0832  -MS      Time Calculation (min) 12 min  -MS      PT Received On 06/07/24  -MS      PT - Next Appointment 06/08/24  -MS      PT Goal Re-Cert Due Date 06/14/24  -MS         Time Calculation- PT    Total Timed Code Minutes- PT 12 minute(s)  -MS                User Key  (r) = Recorded By, (t) = Taken By, (c) = Cosigned By      Initials Name Provider Type    MS Philipp  Jake MCCORMICK, PT Physical Therapist                  Therapy Charges for Today       Code Description Service Date Service Provider Modifiers Qty    40062684963 HC PT EVAL MOD COMPLEXITY 2 6/7/2024 Jake Segal, PT GP 1    40413617665 HC PT THERAPEUTIC ACT EA 15 MIN 6/7/2024 Jake Segal, PT GP 1            PT G-Codes  Outcome Measure Options: AM-PAC 6 Clicks Basic Mobility (PT)  AM-PAC 6 Clicks Score (PT): 10  PT Discharge Summary  Anticipated Discharge Disposition (PT): extended care facility, skilled nursing facility    Jake Segal, PT  6/7/2024

## 2024-06-07 NOTE — DISCHARGE PLACEMENT REQUEST
Elva Teixeirapilar Gerber (63 y.o. Female)       Date of Birth   1960    Social Security Number       Address   [address] [place] [address]    Home Phone   [phone]    MRN   [id number]       Nondenominational   Faith    Marital Status                               Admission Date   6/6/24    Admission Type   Emergency    Admitting Provider   Angelina Dobson MD    Attending Provider   Mega Del Angel MD    Department, Room/Bed   [address], [address]       Discharge Date       Discharge Disposition       Discharge Destination                                 Attending Provider: Mega Del Angel MD    Allergies: Amlodipine, Amlodipine Besylate, Penicillins, Lisinopril, Metoclopramide, Oxycodone    Isolation: None   Infection: None   Code Status: No CPR    Ht: --   Wt: 104 kg (228 lb 6.3 oz)    Admission Cmt: None   Principal Problem: Syncope and collapse [R55]                   Active Insurance as of 6/6/2024       Primary Coverage       Payor Plan Insurance Group Employer/Plan Group    [employer] MEDICARE REPLACEMENT [facility] MED ADV HMO [id number]       Payor Plan Address Payor Plan Phone Number Payor Plan Fax Number Effective Dates    [address]   3/1/2024 - None Entered    [facility] [address]         Subscriber Name Subscriber Birth Date Member ID       BHAVANA TEIXEIRA 1960 [id number]                     Emergency Contacts        (Rel.) Home Phone Work Phone Mobile Phone    Mauricio Teixeira (Spouse) [phone] -- --    [place] (Daughter) -- -- [phone]    Diomedes Laws (Brother) -- -- [phone]

## 2024-06-07 NOTE — PROGRESS NOTES
Name: Bhavana Teixeira ADMIT: 2024   : 1960  PCP: Naya Mayfield KEMAL Watson    MRN: [id number] LOS: 1 days   AGE/SEX: 63 y.o. female  ROOM[facility]     Subjective   Subjective   Resting in bed.  No family at bedside.  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  She resides in a facility and was eating lunch yesterday and apparently lost consciousness.  Per ED notes, she has been getting progressively weaker over the last week or 2.  He denies any chest pain or trouble breathing and denies any present nausea or vomiting although did have some nausea during her syncopal episode on admission.  She does report some burning with urination.  She denies any worsening one-sided weakness or paresthesias, but does feel like she is having intermittent blurriness in her eyes.  States this is new.     Objective   Objective   Vital Signs  Temp:  [97.5 °F (36.4 °C)-98.1 °F (36.7 °C)] 98.1 °F (36.7 °C)  Heart Rate:  [59-94] 61  Resp:  [18] 18  BP: (126-152)/(73-83) 126/82  SpO2:  [93 %-98 %] 97 %  on  Flow (L/min):  [1-2] 1;   Device (Oxygen Therapy): nasal cannula  Body mass index is 34.73 kg/m².    Physical Exam  Vitals and nursing note reviewed.   Constitutional:       Appearance: She is obese. She is ill-appearing. She is not toxic-appearing.   Cardiovascular:      Rate and Rhythm: Normal rate and regular rhythm.      Pulses: Normal pulses.   Pulmonary:      Effort: Pulmonary effort is normal. No respiratory distress.      Comments: Diminished, on 2 L  Abdominal:      General: Bowel sounds are normal. There is no distension.      Palpations: Abdomen is soft.      Tenderness: There is no abdominal tenderness.   Musculoskeletal:         General: No swelling. Normal range of motion.   Skin:     General: Skin is warm and dry.      Findings: No bruising.   Neurological:      Mental Status: She is alert  "and oriented to person, place, and time.      Comments: Delayed verbal response at times. No focal deficits but reports her right side is baseline weaker.   Psychiatric:         Mood and Affect: Mood normal.         Behavior: Behavior normal.       Results Review:       I reviewed the patient's new clinical results.  Results from last 7 days   Lab Units 06/07/24  0638 06/06/24  1501   WBC 10*3/mm3 6.79 6.07   HEMOGLOBIN g/dL 12.9 13.4   PLATELETS 10*3/mm3 200 223     Results from last 7 days   Lab Units 06/07/24  0638 06/06/24  1501   SODIUM mmol/L 141 143   POTASSIUM mmol/L 3.8 4.0   CHLORIDE mmol/L 108* 108*   CO2 mmol/L 23.5 27.0   BUN mg/dL 13 17   CREATININE mg/dL 0.58 0.86   GLUCOSE mg/dL 91 109*   CrCl cannot be calculated (Unknown ideal weight.).  Results from last 7 days   Lab Units 06/06/24  1501   ALBUMIN g/dL 4.0   BILIRUBIN mg/dL 0.2   ALK PHOS U/L 205*   AST (SGOT) U/L 30   ALT (SGPT) U/L 21     Results from last 7 days   Lab Units 06/07/24  0638 06/06/24  1501   CALCIUM mg/dL 8.9 9.3   ALBUMIN g/dL  --  4.0   MAGNESIUM mg/dL  --  1.9     Results from last 7 days   Lab Units 06/06/24  1726   LACTATE mmol/L 1.5     No results found for: \"HGBA1C\", \"POCGLU\"    atorvastatin, 40 mg, Oral, Nightly  cefTRIAXone, 1,000 mg, Intravenous, Q24H  clopidogrel, 75 mg, Oral, Daily  donepezil, 10 mg, Oral, Nightly  FLUoxetine, 20 mg, Oral, Daily  gabapentin, 400 mg, Oral, TID  lansoprazole, 15 mg, Oral, Daily  levETIRAcetam, 500 mg, Oral, Q12H  levothyroxine, 112 mcg, Oral, Daily  losartan, 50 mg, Oral, Daily  magnesium oxide, 400 mg, Oral, Daily  memantine, 10 mg, Oral, Nightly  memantine, 5 mg, Oral, QAM  zonisamide, 100 mg, Oral, Daily      sodium chloride, 75 mL/hr, Last Rate: 75 mL/hr (06/07/24 1203)    Diet: Cardiac; Healthy Heart (2-3 Na+); Fluid Consistency: Thin (IDDSI 0)       Assessment/Plan     Active Hospital Problems    Diagnosis  POA    **Syncope and collapse [R55]  Yes    History of CVA (cerebrovascular " accident) [Z86.73]  Not Applicable    Acute UTI [N39.0]  Yes    Altered mental status [R41.82]  Yes    Sleep apnea [G47.30]  Yes    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     Ms. Teixeira is a 63 y.o. female who presented to the emergency room after a syncopal episode while at her facility eating lunch.  She briefly lost consciousness and was easily aroused when returning to her room.  She has had some associated nausea.  Family apparently reported that she had been getting progressively weaker over the last week or 2 with associated lethargy.  She is baseline wheelchair-bound for progressive neurological disease.  She was found to have a urinary tract infection on admission.    Syncope and collapse  Altered mental status  -Suspect mostly metabolic in nature secondary to underlying UTI.  -However, she does have underlying progressive neurological disorder with new reports of vision blurriness as well as history of seizures.  Will ask neurology to evaluate.  -Could be a vasovagal component to her syncope given associated nausea as well.  Continue low rate of fluids for now.  -Mental status seems to be improving.    Acute UTI  -Urine culture currently growing greater than 100 K E.coli.  -Continue Rocephin.    CADASIL  History of CVA  History of seizures  -Neurology to see as above.  -Continue home Lipitor, Plavix, Aricept and Namenda.  -Continue home Zonegran and Keppra.  -Seizure precautions.    Hypothyroidism  -Home replacement.    GERD  -PPI    Discussed with patient.    VTE Prophylaxis - SCDs  Code Status - No CPR/intubation.  Disposition - Anticipate discharge back to facility in next 1-2 days.      KEMAL Riley  [facility]  06/07/24  15:33 EDT

## 2024-06-07 NOTE — NURSING NOTE
Pt resting in bed, A/O x3. Slow to respond slight slurred speech with aphagia. Pt pleasant. Able to feed self. Slight blurry vision to left eye and right side weaker then left. History of stroke states leaving right side weak. No numbness or tingling present per pt. Able to wiggle and move both legs and feet, arms and  strength moderate with right little less. Pt is incontinent with loose stools and hooked up to purwick.    Neuro consult placed.

## 2024-06-07 NOTE — H&P
HISTORY AND PHYSICAL   [facility]        Date of Admission: 2024  Patient Identification:  Name: Bhavana Teixeira  Age: 63 y.o.  Sex: female  :  1960  MRN: [id number]                     Primary Care Physician: Naya Mayfield APRN    Chief Complaint:  63 year old female presented to the emergency room with weakness and after becoming unresponsive at the dining room at her nursing home; she regained consciousness quickly; weakness has progressed over the last week; she has a history of cva and cadasil disease; no fever or chills; she has had some nausea; she is alert and able to answer very simple questions at this time; her  is at the bedside    History of Present Illness:   As above    Past Medical History:  Past Medical History:   Diagnosis Date    Arthritis     Cervical spine disease     Degenerative joint disease (DJD) of lumbar spine     Depression     Migraine     Partial epilepsy with impairment of consciousness 2013    Seasonal allergies     Seizures     Sleep apnea     Thyroid disease      Past Surgical History:  Past Surgical History:   Procedure Laterality Date    BACK SURGERY      CHOLECYSTECTOMY      HYSTERECTOMY        Home Meds:  Medications Prior to Admission   Medication Sig Dispense Refill Last Dose    acetaminophen (TYLENOL) 500 MG tablet Take 2 tablets by mouth 2 (Two) Times a Day.       atorvastatin (LIPITOR) 40 MG tablet Take 1 tablet by mouth Every Night.       clopidogrel (PLAVIX) 75 MG tablet Take 1 tablet by mouth Daily.       donepezil (ARICEPT) 10 MG tablet Take 1 tablet by mouth Every Night. 90 tablet 3     FLUoxetine (PROzac) 20 MG capsule Take 1 capsule by mouth Daily.       gabapentin (NEURONTIN) 400 MG capsule Take 1 capsule by mouth 3 (Three) Times a Day. 90 capsule 0     lansoprazole (PREVACID SOLUTAB) 30 MG Tablet Delayed Release Dispersible disintegrating tablet Take 15 mg by mouth Daily.       levETIRAcetam (KEPPRA) 500 MG tablet  Take 0.5 tablets by mouth Every 12 (Twelve) Hours. (Patient taking differently: Take 1 tablet by mouth Every 12 (Twelve) Hours.) 90 tablet 3     levothyroxine (SYNTHROID, LEVOTHROID) 112 MCG tablet Take 1 tablet by mouth Daily.       loperamide (IMODIUM) 2 MG capsule Take 1 capsule by mouth Every 8 (Eight) Hours As Needed for Diarrhea.       losartan (COZAAR) 50 MG tablet Take 1 tablet by mouth Daily.       magnesium oxide (MAG-OX) 400 MG tablet Take 1 tablet by mouth Daily.       memantine (NAMENDA) 10 MG tablet Take 1 tablet by mouth Every Night.       memantine (NAMENDA) 5 MG tablet Take 1 tablet by mouth 2 (Two) Times a Day. (Patient taking differently: Take 1 tablet by mouth Every Morning.) 180 tablet 3     metFORMIN (GLUCOPHAGE) 500 MG tablet Take 1 tablet by mouth Daily.       methocarbamol (ROBAXIN) 500 MG tablet Take 2 tablets by mouth Every 8 (Eight) Hours As Needed for Muscle Spasms.       Multiple Vitamins-Minerals (MULTIVITAMIN & MINERAL PO) Take 1 tablet by mouth Daily.       zonisamide (ZONEGRAN) 100 MG capsule Take 3 capsules by mouth Every Night. (Patient taking differently: Take 1 capsule by mouth Daily.) 270 capsule 3     aspirin  MG tablet Take 1 tablet by mouth Daily. 30 tablet 0     atorvastatin (LIPITOR) 20 MG tablet TAKE 1 TABLET BY MOUTH EVERY DAY 90 tablet 3     Coenzyme Q10 400 MG capsule Take 1 capsule by mouth Daily.       conjugated estrogens (PREMARIN) 0.625 MG/GM vaginal cream Insert 0.5 g into the vagina 1 (One) Time Per Week.       cyanocobalamin 1000 MCG/ML injection Inject 1 mL into the appropriate muscle as directed by prescriber Every 28 (Twenty-Eight) Days.       fluticasone (FLONASE) 50 MCG/ACT nasal spray 2 sprays into the nostril(s) as directed by provider Daily.       meloxicam (MOBIC) 15 MG tablet Take 1 tablet by mouth Daily.       Mirabegron ER (MYRBETRIQ) 50 MG tablet sustained-release 24 hour 24 hr tablet Take 50 mg by mouth Daily.        sulfamethoxazole-trimethoprim (BACTRIM DS,SEPTRA DS) 800-160 MG per tablet Take 1 tablet by mouth 2 (Two) Times a Day. Drink plenty of water 14 tablet 0     triamterene-hydrochlorothiazide (MAXZIDE-25) 37.5-25 MG per tablet Take 1 tablet by mouth.          Allergies:  Allergies   Allergen Reactions    Amlodipine Swelling    Amlodipine Besylate Swelling    Penicillins Hives    Lisinopril Other (See Comments)     COUGH    Metoclopramide Other (See Comments)     SLEEPY    Oxycodone Nausea Only     Immunizations:  Immunization History   Administered Date(s) Administered    COVID-19 (PFIZER) Purple Cap Monovalent 2021, 2021, 2022     Social History:   Social History     Social History Narrative    Not on file     Social History     Socioeconomic History    Marital status:    Tobacco Use    Smoking status: Never    Smokeless tobacco: Never   Substance and Sexual Activity    Alcohol use: Yes     Comment: social     Drug use: No    Sexual activity: Never       Family History:  Family History   Problem Relation Age of Onset    Migraines Mother     Migraines Father     Multiple sclerosis Father     Migraines Sister         Review of Systems  Not obtainable from the patient       Objective:  T Max 24 hrs: Temp (24hrs), Av.8 °F (36.6 °C), Min:97.5 °F (36.4 °C), Max:98.1 °F (36.7 °C)    Vitals Ranges:   Temp:  [97.5 °F (36.4 °C)-98.1 °F (36.7 °C)] 97.5 °F (36.4 °C)  Heart Rate:  [64-71] 71  Resp:  [16-18] 18  BP: (139-152)/(75-90) 152/75      Exam:  /75 (BP Location: Left arm, Patient Position: Lying)   Pulse 71   Temp 97.5 °F (36.4 °C) (Oral)   Resp 18   SpO2 96%     General Appearance:    Alert, cooperative, no distress, appears stated age   Head:    Normocephalic, without obvious abnormality, atraumatic   Eyes:    PERRL, conjunctivae/corneas clear, EOM's intact, both eyes   Ears:    Normal external ear canals, both ears   Nose:   Nares normal, septum midline, mucosa normal, no drainage     or sinus tenderness   Throat:   Lips, mucosa, and tongue normal   Neck:   Supple, symmetrical, trachea midline, no adenopathy;     thyroid:  no enlargement/tenderness/nodules; no carotid    bruit or JVD   Back:     Symmetric, no curvature, ROM normal, no CVA tenderness   Lungs:     Decreased breath sounds bilaterally, respirations unlabored   Chest Wall:    No tenderness or deformity    Heart:    Regular rate and rhythm, S1 and S2 normal, no murmur, rub   or gallop   Abdomen:     Soft, nontender, bowel sounds active all four quadrants,     no masses, no hepatomegaly, no splenomegaly   Extremities:   Extremities normal, atraumatic, no cyanosis or edema                       .    Data Review:  Labs in chart were reviewed.  WBC   Date Value Ref Range Status   06/06/2024 6.07 3.40 - 10.80 10*3/mm3 Final     Hemoglobin   Date Value Ref Range Status   06/06/2024 13.4 12.0 - 15.9 g/dL Final     Hematocrit   Date Value Ref Range Status   06/06/2024 42.7 34.0 - 46.6 % Final     Platelets   Date Value Ref Range Status   06/06/2024 223 140 - 450 10*3/mm3 Final     Sodium   Date Value Ref Range Status   06/06/2024 143 136 - 145 mmol/L Final     Potassium   Date Value Ref Range Status   06/06/2024 4.0 3.5 - 5.2 mmol/L Final     Chloride   Date Value Ref Range Status   06/06/2024 108 (H) 98 - 107 mmol/L Final     CO2   Date Value Ref Range Status   06/06/2024 27.0 22.0 - 29.0 mmol/L Final     BUN   Date Value Ref Range Status   06/06/2024 17 8 - 23 mg/dL Final     Creatinine   Date Value Ref Range Status   06/06/2024 0.86 0.57 - 1.00 mg/dL Final     Glucose   Date Value Ref Range Status   06/06/2024 109 (H) 65 - 99 mg/dL Final     Calcium   Date Value Ref Range Status   06/06/2024 9.3 8.6 - 10.5 mg/dL Final     Magnesium   Date Value Ref Range Status   06/06/2024 1.9 1.6 - 2.4 mg/dL Final                Imaging Results (All)       Procedure Component Value Units Date/Time    CT Head Without Contrast [[id number]] Collected:  06/06/24 1611     Updated: 06/06/24 1809    Narrative:      CT HEAD WITHOUT CONTRAST     HISTORY: Altered mental status, progressive weakness.     COMPARISON: Comparison is made to the CT examination 04/27/2023.     FINDINGS: The brain and ventricles are symmetrical. There is extensive  small vessel ischemic disease. No focal area of decreased attenuation to  suggest acute infarction is identified. Mild vascular calcification is  noted. There is no evidence of hemorrhage, hydrocephalus or of abnormal  extra-axial fluid.       Impression:      No acute process is identified. Further evaluation could be  performed with a MRI examination of the brain as indicated.           Radiation dose reduction techniques were utilized, including automated  exposure control and exposure modulation based on body size.        This report was finalized on 6/6/2024 6:05 PM by Dr. Peter Fontaine M.D  on Workstation: [id number]       XR Chest 1 View [[id number]] Collected: 06/06/24 1526     Updated: 06/06/24 1531    Narrative:      XR CHEST 1 VW-     HISTORY: Female who is 63 years-old, weakness     TECHNIQUE: Frontal view of the chest     COMPARISON: 9/20/2022     FINDINGS: The heart size is borderline. Pulmonary vasculature is  unremarkable. No focal pulmonary consolidation, pleural effusion, or  pneumothorax. No acute osseous process.       Impression:      No focal pulmonary consolidation. Borderline heart size.  Follow-up as clinical indications persist.     This report was finalized on 6/6/2024 3:28 PM by Dr. Luis Alfredo Lagunas M.D on Workstation: [id number]                 Assessment:  Active Hospital Problems    Diagnosis  POA    **Altered mental status [R41.82]  Yes      Resolved Hospital Problems   No resolved problems to display.   Uti  Cadasil disease  Hypothyroidism  Sleep apnea  hyperglycemia    Plan:  Will continue antibiotics, fluids  Monitor on telemetry  Patient is dnr  Trend labs  Dw patient,  and ed  provider      Angelina Dobson MD  6/6/2024  20:39 EDT

## 2024-06-07 NOTE — THERAPY EVALUATION
Patient Name: Bhavana Teixeira  : 1960    MRN: [id number]                              Today's Date: 2024       Admit Date: 2024    Visit Dx:     ICD-10-CM ICD-9-CM   1. Altered mental status, unspecified altered mental status type  R41.82 780.97   2. Acute UTI  N39.0 599.0   3. 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)    Cervical radiculopathy    Degeneration of intervertebral disc of cervical region    Depression    Fracture of distal end of radius    Static tremor    Family history of colon cancer    Gastroesophageal reflux disease    Headache    History of respiratory system disease    History of total hysterectomy with bilateral salpingo-oophorectomy (BSO)    Hypothyroidism    Irritable bowel syndrome    Adiposity    Osteopenia    Partial epilepsy with impairment of consciousness    Encounter for preprocedural cardiovascular examination    Simple renal cyst    Lesion of vulva    Prolapse 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    Cerebrovascular accident    Seizures    Hemispheric carotid artery syndrome    Transient ischemic attack    Hypernatremia    Hyperglycemia    DNR (do not resuscitate)    Cerebrovascular accident (CVA)    Obesity (BMI 30-39.9)    Partial right third nerve palsy    Migraine    Sleep apnea    Altered mental status     Past Medical History:   Diagnosis Date    Arthritis     Cervical spine disease     Degenerative joint disease (DJD) of lumbar spine     Depression     Migraine     Partial epilepsy with impairment of consciousness 2013    Seasonal allergies     Seizures     Sleep apnea     Thyroid disease      Past Surgical History:   Procedure Laterality Date    BACK SURGERY      CHOLECYSTECTOMY       HYSTERECTOMY        General Information       [facility] Name 06/07/24 1259          OT Time and Intention    Document Type evaluation  -     Mode of Treatment physical therapy;occupational therapy;co-treatment  Activity limitation due to fatigue/weakness; Working on functional balance and strengthening while performing ADL's  -[facility] Name 06/07/24 1259          General Information    Patient Profile Reviewed yes  -SM     Prior Level of Function --  pt reports assist with ADLs, transfers to a w/c via pivot transfer with X1 assist. Pt may not be an accurate historian.  -     Existing Precautions/Restrictions fall  -[facility] Name 06/07/24 1259          Living Environment    People in Home facility resident  -[facility] Name 06/07/24 1259          Cognition    Orientation Status (Cognition) oriented to;person;place  oriented to year only, speech difficulties noted from previous stroke. Pt reports that is her baseline.  -[facility] Name 06/07/24 1259          Safety Issues, Functional Mobility    Impairments Affecting Function (Mobility) balance;endurance/activity tolerance;strength;coordination;postural/trunk control;range of motion (ROM);cognition  -               User Key  (r) = Recorded By, (t) = Taken By, (c) = Cosigned By      Initials Name Provider Type     Parul Knight OT Occupational Therapist                     Mobility/ADL's       [facility] Name 06/07/24 1301          Bed Mobility    Supine-Sit [place] (Bed Mobility) maximum assist (25% patient effort);verbal cues  -     Sit-Supine [place] (Bed Mobility) maximum assist (25% patient effort);2 person assist;verbal cues  -     Assistive Device (Bed Mobility) head of bed elevated;bed rails  -[facility] Name 06/07/24 1301          Sit-Stand Transfer    Sit-Stand [place] (Transfers) maximum assist (25% patient effort);2 person assist;verbal cues  -[facility] Name 06/07/24 1301          Functional Mobility    Functional  Mobility- Comment poor standing balance, unable to progress.  -SM       Row Name 06/07/24 1301          Activities of Daily Living    BADL Assessment/Intervention lower body dressing;feeding;toileting  -SM       Row Name 06/07/24 1301          Lower Body Dressing Assessment/Training    [place] Level (Lower Body Dressing) dependent (less than 25% patient effort)  -SM       Row Name 06/07/24 1301          Self-Feeding Assessment/Training    [place] Level (Feeding) set up  -     Position (Self-Feeding) sitting up in bed  -     Comment, (Feeding) Per charting, pt feeding self after set up  -SM       Row Name 06/07/24 1301          Toileting Assessment/Training    [place] Level (Toileting) dependent (less than 25% patient effort)  -     Comment, (Toileting) large BM noted in brief when standing  -               User Key  (r) = Recorded By, (t) = Taken By, (c) = Cosigned By      Initials Name Provider Type     Parul Knight OT Occupational Therapist                   Obj/Interventions       Row Name 06/07/24 1307          Sensory Assessment (Somatosensory)    Sensory Assessment (Somatosensory) not tested  -SM       Row Name 06/07/24 1307          Vision Assessment/Intervention    Visual Impairment/Limitations unable/difficult to assess  -SM       Row Name 06/07/24 1307          Range of Motion Comprehensive    Comment, General Range of Motion BUE AROM impaired ~25% all major joints  -SM       Row Name 06/07/24 1307          Strength Comprehensive (MMT)    Comment, General Manual Muscle Testing (MMT) Assessment BUE 3-/5  -SM       Row Name 06/07/24 1307          Motor Skills    Motor Skills coordination  -     Coordination fine motor deficit;gross motor deficit;upper extremity;minimal impairment;moderate impairment  -SM       Row Name 06/07/24 1307          Balance    Balance Assessment sitting static balance;standing static balance  -     Static Sitting Balance minimal assist  -      Position, Sitting Balance sitting edge of bed  -SM     Static Standing Balance maximum assist;2-person assist  -SM     Position/Device Used, Standing Balance supported  -SM               User Key  (r) = Recorded By, (t) = Taken By, (c) = Cosigned By      Initials Name Provider Type    Parul Braxton OT Occupational Therapist                   Goals/Plan       Row Name 06/07/24 1316          Transfer Goal 1 (OT)    Activity/Assistive Device (Transfer Goal 1, OT) commode, 3-in-1;bed-to-chair/chair-to-bed  -SM     [place] Level/Cues Needed (Transfer Goal 1, OT) moderate assist (50-74% patient effort)  -SM     Time Frame (Transfer Goal 1, OT) short term goal (STG);2 weeks  -SM     Progress/Outcome (Transfer Goal 1, OT) goal ongoing  -       Row Name 06/07/24 1316          Toileting Goal 1 (OT)    Activity/Device (Toileting Goal 1, OT) toileting skills, all  -SM     [place] Level/Cues Needed (Toileting Goal 1, OT) moderate assist (50-74% patient effort)  -SM     Time Frame (Toileting Goal 1, OT) short term goal (STG);2 weeks  -SM     Progress/Outcome (Toileting Goal 1, OT) goal ongoing  -       Row Name 06/07/24 1316          Problem Specific Goal 1 (OT)    Problem Specific Goal 1 (OT) Pt able to maintain siting 1 min X1 assist for balance at a rwx while performing bADLs.  -SM     Time Frame (Problem Specific Goal 1, OT) short term goal (STG);2 weeks  -SM     Progress/Outcome (Problem Specific Goal 1, OT) goal ongoing  -       Row Name 06/07/24 1316          Therapy Assessment/Plan (OT)    Planned Therapy Interventions (OT) activity tolerance training;functional balance retraining;occupation/activity based interventions;patient/caregiver education/training;transfer/mobility retraining;strengthening exercise  -SM               User Key  (r) = Recorded By, (t) = Taken By, (c) = Cosigned By      Initials Name Provider Type    Parul Braxton OT Occupational Therapist                   Clinical  Impression       Row Name 06/07/24 1311          Pain Assessment    Pretreatment Pain Rating 0/10 - no pain  -     Posttreatment Pain Rating 0/10 - no pain  -       Row Name 06/07/24 1311          Plan of Care Review    Plan of Care Reviewed With patient  -     Outcome Evaluation Pt is a 63 y.o female admitted 6/6 with weakness and after becoming unresponsive at the dining room at her nursing home; she regained consciousness quickly; weakness has progressed over the last week. She has a hx of stroke. She is w/c bound at baseline, she reports she stand pivots for her ADLs with assistance. Pt weak BUE, she requires max AX2 to stand today, not yet able to pivot at this time. She appears weaker than her stated baseline. Will continue to focus on ADL transfers/independence to return to facility at [facility].  -       Row Name 06/07/24 1311          Therapy Assessment/Plan (OT)    Rehab Potential (OT) good, to achieve stated therapy goals  -     Criteria for Skilled Therapeutic Interventions Met (OT) yes;skilled treatment is necessary  -     Therapy Frequency (OT) 3 times/wk  -       Row Name 06/07/24 1311          Therapy Plan Review/Discharge Plan (OT)    Anticipated Discharge Disposition (OT) skilled nursing facility;extended care facility  -[facility] Name 06/07/24 1311          Positioning and Restraints    Pre-Treatment Position in bed  -     Post Treatment Position bed  -     In Bed fowlers;encouraged to call for assist;exit alarm on;notified nsg;call light within reach  -               User Key  (r) = Recorded By, (t) = Taken By, (c) = Cosigned By      Initials Name Provider Type     Parul Knight, OT Occupational Therapist                   Outcome Measures       Row Name 06/07/24 1318          How much help from another is currently needed...    Putting on and taking off regular lower body clothing? 1  -SM     Bathing (including washing, rinsing, and drying) 2  -SM     Toileting (which  includes using toilet bed pan or urinal) 1  -SM     Putting on and taking off regular upper body clothing 2  -SM     Taking care of personal grooming (such as brushing teeth) 3  -SM     Eating meals 3  -SM     AM-PAC 6 Clicks Score (OT) 12  -       Row Name 06/07/24 0912          How much help from another person do you currently need...    Turning from your back to your side while in flat bed without using bedrails? 2  -MS     Moving from lying on back to sitting on the side of a flat bed without bedrails? 2  -MS     Moving to and from a bed to a chair (including a wheelchair)? 2  -MS     Standing up from a chair using your arms (e.g., wheelchair, bedside chair)? 2  -MS     Climbing 3-5 steps with a railing? 1  -MS     To walk in hospital room? 1  -MS     AM-PAC 6 Clicks Score (PT) 10  -MS     Highest Level of Mobility Goal 4 --> Transfer to chair/commode  -MS       Row Name 06/07/24 1318          Modified Gigi Scale    Modified [place] Scale 4 - Moderately severe disability.  Unable to walk without assistance, and unable to attend to own bodily needs without assistance.  -       Row Name 06/07/24 1318 06/07/24 0912       Functional Assessment    Outcome Measure Options AM-PAC 6 Clicks Daily Activity (OT);Modified [place]  - AM-PAC 6 Clicks Basic Mobility (PT)  -MS              User Key  (r) = Recorded By, (t) = Taken By, (c) = Cosigned By      Initials Name Provider Type    Jake Linares, PT Physical Therapist     Parul Knight OT Occupational Therapist                    Occupational Therapy Education       Title: PT OT SLP Therapies (In Progress)       Topic: Occupational Therapy (In Progress)       Point: ADL training (Done)       Description:   Instruct learner(s) on proper safety adaptation and remediation techniques during self care or transfers.   Instruct in proper use of assistive devices.                  Learning Progress Summary             Patient Acceptance, E, VU by  at  6/7/2024 8440    Comment: OT goals, POC                         Point: Home exercise program (Not Started)       Description:   Instruct learner(s) on appropriate technique for monitoring, assisting and/or progressing therapeutic exercises/activities.                  Learner Progress:  Not documented in this visit.              Point: Precautions (Not Started)       Description:   Instruct learner(s) on prescribed precautions during self-care and functional transfers.                  Learner Progress:  Not documented in this visit.              Point: Body mechanics (Not Started)       Description:   Instruct learner(s) on proper positioning and spine alignment during self-care, functional mobility activities and/or exercises.                  Learner Progress:  Not documented in this visit.                              User Key       Initials Effective Dates Name Provider Type Discipline     04/02/20 -  Parul Knight OT Occupational Therapist OT                  OT Recommendation and Plan  Planned Therapy Interventions (OT): activity tolerance training, functional balance retraining, occupation/activity based interventions, patient/caregiver education/training, transfer/mobility retraining, strengthening exercise  Therapy Frequency (OT): 3 times/wk  Plan of Care Review  Plan of Care Reviewed With: patient  Outcome Evaluation: Pt is a 63 y.o female admitted 6/6 with weakness and after becoming unresponsive at the dining room at her nursing home; she regained consciousness quickly; weakness has progressed over the last week. She has a hx of stroke. She is w/c bound at baseline, she reports she stand pivots for her ADLs with assistance. Pt weak BUE, she requires max AX2 to stand today, not yet able to pivot at this time. She appears weaker than her stated baseline. Will continue to focus on ADL transfers/independence to return to facility at [facility].     Time Calculation:   Evaluation Complexity (OT)  Review  Occupational Profile/Medical/Therapy History Complexity: expanded/moderate complexity  Assessment, Occupational Performance/Identification of Deficit Complexity: 3-5 performance deficits  Clinical Decision Making Complexity (OT): detailed assessment/moderate complexity  Overall Complexity of Evaluation (OT): moderate complexity     Time Calculation- OT       Row Name 06/07/24 1319             Time Calculation- OT    OT Start Time 0822  -      OT Stop Time 0832  -      OT Time Calculation (min) 10 min  -      OT Received On 06/07/24  -      OT - Next Appointment 06/10/24  -      OT Goal Re-Cert Due Date 06/21/24  -         Untimed Charges    OT Eval/Re-eval Minutes 10  -SM         Total Minutes    Untimed Charges Total Minutes 10  -SM       Total Minutes 10  -SM                User Key  (r) = Recorded By, (t) = Taken By, (c) = Cosigned By      Initials Name Provider Type     Parul Knight, OT Occupational Therapist                  Therapy Charges for Today       Code Description Service Date Service Provider Modifiers Qty    55984001921 HC OT EVAL MOD COMPLEXITY 3 6/7/2024 Parul Knight OT GO 1                 Parul Knight OT  6/7/2024

## 2024-06-07 NOTE — CONSULTS
Neurology Consult Note    Consult Date: 6/7/2024    Referring MD: Angelina Dobson, *    Reason for Consult I have been asked to see the patient in neurological consultation to render advice and opinion regarding weakness    Bhavana Teixeira is a 63 y.o. female with reported history of cervical spine disease, CADASIL, epilepsy.  At baseline she lives in a nursing facility.  Apparently she developed weakness and decreased level of consciousness during a meal.  The patient describes feeling like her legs were sliding out of the chair.  Currently she feels like she is back to her neurologic baseline.    Past Medical History:   Diagnosis Date    Arthritis     Cervical spine disease     Degenerative joint disease (DJD) of lumbar spine     Depression     Migraine     Partial epilepsy with impairment of consciousness 1/4/2013    Seasonal allergies     Seizures     Sleep apnea     Thyroid disease        Exam  /82 (BP Location: Left arm, Patient Position: Lying)   Pulse 61   Temp 98.1 °F (36.7 °C) (Oral)   Resp 18   Wt 104 kg (228 lb 6.3 oz)   SpO2 97%   BMI 34.73 kg/m²   Gen: NAD, vitals reviewed  MS: Oriented, memory intact, normal attention/concentration, language intact, scanning speech, no neglect  CN: visual acuity grossly normal, PERRL, EOMI, no facial droop, severe spastic dysarthria, scanning speech  Motor: 4+/5 bilateral upper and lower extremities    DATA:    Lab Results   Component Value Date    GLUCOSE 91 06/07/2024    CALCIUM 8.9 06/07/2024     06/07/2024    K 3.8 06/07/2024    CO2 23.5 06/07/2024     (H) 06/07/2024    BUN 13 06/07/2024    CREATININE 0.58 06/07/2024    EGFRIFAFRI >60 02/10/2023    EGFRIFNONA 55 (L) 01/07/2018    BCR 22.4 06/07/2024    ANIONGAP 9.5 06/07/2024     Lab Results   Component Value Date    WBC 6.79 06/07/2024    HGB 12.9 06/07/2024    HCT 41.1 06/07/2024    MCV 83.0 06/07/2024     06/07/2024       Lab review: CBC, BMP unremarkable    Imaging  review: 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  Radiology report reviewed.  No acute findings noted.  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  I suspect patient may have had a small stroke at her facility.    PLAN:  Check routine brain MRI  Continue home Keppra and Zonegran

## 2024-06-07 NOTE — CASE MANAGEMENT/SOCIAL WORK
Discharge Planning Assessment  [facility]     Patient Name: Bhavana Teixeira  MRN: [id number]  Today's Date: 6/7/2024    Admit Date: 6/6/2024    Plan: Return to long-term care Medicaid bed at [facility]   Discharge Needs Assessment       Row Name 06/07/24 1529       Living Environment    People in Home facility resident    Current Living Arrangements extended care facility    Potentially Unsafe Housing Conditions none    Primary Care Provided by self    Provides Primary Care For no one    Family Caregiver if Needed other (see comments)  staff at [facility]    Quality of Family Relationships helpful;involved;supportive    Able to Return to Prior Arrangements yes       Resource/Environmental Concerns    Resource/Environmental Concerns none    Transportation Concerns none       Transition Planning    Patient/Family Anticipates Transition to long-term care facility    Patient/Family Anticipated Services at Transition skilled nursing    Transportation Anticipated health plan transportation       Discharge Needs Assessment    Readmission Within the Last 30 Days no previous admission in last 30 days    Current Outpatient/Agency/Support Group skilled nursing facility    Equipment Currently Used at Home wheelchair    Concerns to be Addressed care coordination/care conferences;discharge planning    Anticipated Changes Related to Illness none    Equipment Needed After Discharge none    Outpatient/Agency/Support Group Needs skilled nursing facility    Discharge Facility/Level of Care Needs nursing facility, skilled    Provided Post Acute Provider List? N/A    Provided Post Acute Provider Quality & Resource List? N/A    Patient's Choice of Community Agency(s) [facility]                   Discharge Plan       Row Name 06/07/24 1531       Plan    Plan Return to long-term care Medicaid bed at [facility]    Patient/Family in Agreement with Plan yes    Plan Comments IMM noted. CCP met with the patient at bedside  and confirmed she is a long-term care patient at [facility]. She has been there for the last 3 months. Her  Mauricio is her emergency contact. She is wheelchair bound at baseline. She is currently using oxygen here at the hospital but does not normally wear oxygen. CCP confirmed with [facility] that the patient is a long-term care Medicaid bed hold and can return at discharge. CCP to place patient in EPIC basket for [facility] so that [facility] can follow patient progress and anticipate return to facility at discharge. CCP to follow. CD, CSW.                  Continued Care and Services - Admitted Since 6/6/2024    No active coordination exists for this encounter.       Expected Discharge Date and Time       Expected Discharge Date Expected Discharge Time    Art 10, 2024            Demographic Summary       Row Name 06/07/24 1529       General Information    Admission Type inpatient    Arrived From emergency department    Required Notices Provided Important Message from Medicare    Referral Source admission list    Reason for Consult discharge planning    Preferred Language English                   Functional Status       Row Name 06/07/24 1529       Functional Status    Usual Activity Tolerance fair    Current Activity Tolerance fair       Functional Status, IADL    Medications assistive equipment and person    Meal Preparation completely dependent    Housekeeping completely dependent    Laundry completely dependent    Shopping completely dependent       Mental Status    General Appearance WDL WDL       Mental Status Summary    Recent Changes in Mental Status/Cognitive Functioning no changes       Employment/    Employment Status disabled                   Psychosocial    No documentation.                  Abuse/Neglect    No documentation.                  Legal    No documentation.                  Substance Abuse    No documentation.                  Patient Forms    No documentation.

## 2024-06-08 LAB
ANION GAP SERPL CALCULATED.3IONS-SCNC: 7.6 MMOL/L (ref 5–15)
BACTERIA SPEC AEROBE CULT: ABNORMAL
BUN SERPL-MCNC: 11 MG/DL (ref 8–23)
BUN/CREAT SERPL: 17.2 (ref 7–25)
CALCIUM SPEC-SCNC: 8.9 MG/DL (ref 8.6–10.5)
CHLORIDE SERPL-SCNC: 111 MMOL/L (ref 98–107)
CO2 SERPL-SCNC: 24.4 MMOL/L (ref 22–29)
CREAT SERPL-MCNC: 0.64 MG/DL (ref 0.57–1)
DEPRECATED RDW RBC AUTO: 42.6 FL (ref 37–54)
EGFRCR SERPLBLD CKD-EPI 2021: 98.2 ML/MIN/1.73
ERYTHROCYTE [DISTWIDTH] IN BLOOD BY AUTOMATED COUNT: 14.2 % (ref 12.3–15.4)
GLUCOSE SERPL-MCNC: 103 MG/DL (ref 65–99)
HCT VFR BLD AUTO: 39.6 % (ref 34–46.6)
HGB BLD-MCNC: 12.6 G/DL (ref 12–15.9)
MCH RBC QN AUTO: 26.2 PG (ref 26.6–33)
MCHC RBC AUTO-ENTMCNC: 31.8 G/DL (ref 31.5–35.7)
MCV RBC AUTO: 82.3 FL (ref 79–97)
PLATELET # BLD AUTO: 202 10*3/MM3 (ref 140–450)
PMV BLD AUTO: 10.7 FL (ref 6–12)
POTASSIUM SERPL-SCNC: 3.9 MMOL/L (ref 3.5–5.2)
RBC # BLD AUTO: 4.81 10*6/MM3 (ref 3.77–5.28)
SODIUM SERPL-SCNC: 143 MMOL/L (ref 136–145)
WBC NRBC COR # BLD AUTO: 6.09 10*3/MM3 (ref 3.4–10.8)

## 2024-06-08 PROCEDURE — 25010000002 CEFTRIAXONE PER 250 MG: Performed by: INTERNAL MEDICINE

## 2024-06-08 PROCEDURE — 80048 BASIC METABOLIC PNL TOTAL CA: CPT | Performed by: NURSE PRACTITIONER

## 2024-06-08 PROCEDURE — 85027 COMPLETE CBC AUTOMATED: CPT | Performed by: NURSE PRACTITIONER

## 2024-06-08 RX ADMIN — MAGNESIUM OXIDE 400 MG (241.3 MG MAGNESIUM) TABLET 400 MG: TABLET at 09:57

## 2024-06-08 RX ADMIN — DONEPEZIL HYDROCHLORIDE 10 MG: 10 TABLET, FILM COATED ORAL at 21:05

## 2024-06-08 RX ADMIN — LEVETIRACETAM 500 MG: 500 TABLET, FILM COATED ORAL at 09:56

## 2024-06-08 RX ADMIN — LEVOTHYROXINE SODIUM 112 MCG: 112 TABLET ORAL at 09:56

## 2024-06-08 RX ADMIN — MEMANTINE HYDROCHLORIDE 5 MG: 10 TABLET, FILM COATED ORAL at 06:21

## 2024-06-08 RX ADMIN — GABAPENTIN 400 MG: 400 CAPSULE ORAL at 21:05

## 2024-06-08 RX ADMIN — GABAPENTIN 400 MG: 400 CAPSULE ORAL at 17:06

## 2024-06-08 RX ADMIN — FLUOXETINE HYDROCHLORIDE 20 MG: 20 CAPSULE ORAL at 09:56

## 2024-06-08 RX ADMIN — GABAPENTIN 400 MG: 400 CAPSULE ORAL at 09:56

## 2024-06-08 RX ADMIN — LEVETIRACETAM 500 MG: 500 TABLET, FILM COATED ORAL at 21:06

## 2024-06-08 RX ADMIN — MEMANTINE HYDROCHLORIDE 10 MG: 10 TABLET, FILM COATED ORAL at 21:05

## 2024-06-08 RX ADMIN — CEFTRIAXONE SODIUM 1000 MG: 1 INJECTION, POWDER, FOR SOLUTION INTRAMUSCULAR; INTRAVENOUS at 17:07

## 2024-06-08 RX ADMIN — ATORVASTATIN CALCIUM 40 MG: 20 TABLET, FILM COATED ORAL at 21:05

## 2024-06-08 RX ADMIN — LOSARTAN POTASSIUM 50 MG: 50 TABLET, FILM COATED ORAL at 09:56

## 2024-06-08 RX ADMIN — CLOPIDOGREL BISULFATE 75 MG: 75 TABLET, FILM COATED ORAL at 09:56

## 2024-06-08 RX ADMIN — ZONISAMIDE 100 MG: 100 CAPSULE ORAL at 09:57

## 2024-06-08 RX ADMIN — LANSOPRAZOLE 15 MG: 15 TABLET, ORALLY DISINTEGRATING ORAL at 09:57

## 2024-06-08 NOTE — PROGRESS NOTES
Name: Bhavana Teixeira ADMIT: 2024   : 1960  PCP: Naya MayfieldKEMAL    MRN: [id number] LOS: 2 days   AGE/SEX: 63 y.o. female  ROOM: [facility]     Subjective   Subjective   Resting in bed.  No family currently at bedside.  She denies any new symptoms overnight.  No chest pain, dyspnea, nausea, vomiting.  Still reports some dysuria, but overall feels well and near her baseline.     Objective   Objective   Vital Signs  Temp:  [97.3 °F (36.3 °C)-98.1 °F (36.7 °C)] 98.1 °F (36.7 °C)  Heart Rate:  [61-82] 61  Resp:  [16-18] 16  BP: (121-148)/(64-90) 148/90  SpO2:  [97 %-99 %] 99 %  on  Flow (L/min):  [1-2] 1;   Device (Oxygen Therapy): nasal cannula  Body mass index is 33.89 kg/m².    Physical Exam  Vitals and nursing note reviewed.   Constitutional:       Appearance: She is obese. She is ill-appearing. She is not toxic-appearing.   Cardiovascular:      Rate and Rhythm: Normal rate and regular rhythm.      Pulses: Normal pulses.   Pulmonary:      Effort: Pulmonary effort is normal. No respiratory distress.      Comments: Diminished, on 2 L  Abdominal:      General: Bowel sounds are normal. There is no distension.      Palpations: Abdomen is soft.      Tenderness: There is no abdominal tenderness.   Musculoskeletal:         General: No swelling. Normal range of motion.   Skin:     General: Skin is warm and dry.      Findings: No bruising.   Neurological:      Mental Status: She is alert and oriented to person, place, and time.      Comments: Delayed verbal response at times. No focal deficits but reports her right side is baseline weaker.   Psychiatric:         Mood and Affect: Mood normal.         Behavior: Behavior normal.     Results Review:       I reviewed the patient's new clinical results.  Results from last 7 days   Lab Units 24  0618 24  0638 24  1501   WBC 10*3/mm3 6.09 6.79 6.07   HEMOGLOBIN g/dL 12.6 12.9 13.4   PLATELETS 10*3/mm3 202 200 223     Results from last 7  "days   Lab Units 06/08/24  0618 06/07/24  0638 06/06/24  1501   SODIUM mmol/L 143 141 143   POTASSIUM mmol/L 3.9 3.8 4.0   CHLORIDE mmol/L 111* 108* 108*   CO2 mmol/L 24.4 23.5 27.0   BUN mg/dL 11 13 17   CREATININE mg/dL 0.64 0.58 0.86   GLUCOSE mg/dL 103* 91 109*   CrCl cannot be calculated (Unknown ideal weight.).  Results from last 7 days   Lab Units 06/06/24  1501   ALBUMIN g/dL 4.0   BILIRUBIN mg/dL 0.2   ALK PHOS U/L 205*   AST (SGOT) U/L 30   ALT (SGPT) U/L 21     Results from last 7 days   Lab Units 06/08/24 0618 06/07/24  0638 06/06/24  1501   CALCIUM mg/dL 8.9 8.9 9.3   ALBUMIN g/dL  --   --  4.0   MAGNESIUM mg/dL  --   --  1.9     Results from last 7 days   Lab Units 06/06/24  1726   LACTATE mmol/L 1.5     No results found for: \"HGBA1C\", \"POCGLU\"    atorvastatin, 40 mg, Oral, Nightly  cefTRIAXone, 1,000 mg, Intravenous, Q24H  clopidogrel, 75 mg, Oral, Daily  donepezil, 10 mg, Oral, Nightly  FLUoxetine, 20 mg, Oral, Daily  gabapentin, 400 mg, Oral, TID  lansoprazole, 15 mg, Oral, Daily  levETIRAcetam, 500 mg, Oral, Q12H  levothyroxine, 112 mcg, Oral, Daily  losartan, 50 mg, Oral, Daily  magnesium oxide, 400 mg, Oral, Daily  memantine, 10 mg, Oral, Nightly  memantine, 5 mg, Oral, QAM  zonisamide, 100 mg, Oral, Daily      sodium chloride, 75 mL/hr, Last Rate: 75 mL/hr (06/07/24 1203)    Diet: Cardiac; Healthy Heart (2-3 Na+); Fluid Consistency: Thin (IDDSI 0)       Assessment/Plan     Active Hospital Problems    Diagnosis  POA    **Syncope and collapse [R55]  Yes    History of CVA (cerebrovascular accident) [Z86.73]  Not Applicable    Acute UTI [N39.0]  Yes    Altered mental status [R41.82]  Yes    Sleep apnea [G47.30]  Yes    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     Ms. Teixeira is a 63 " y.o. female who presented to the emergency room after a syncopal episode while at her facility eating lunch.  She briefly lost consciousness and was easily aroused when returning to her room.  She has had some associated nausea.  Family apparently reported that she had been getting progressively weaker over the last week or 2 with associated lethargy.  She is baseline wheelchair-bound for progressive neurological disease.  She was found to have a urinary tract infection on admission.     Syncope and collapse  Altered mental status  -Suspect mostly metabolic in nature secondary to underlying UTI.  -However, she does have underlying progressive neurological disorder with new reports of vision blurriness as well as history of seizures. Neurology evaluated. MRI brain obtained with no acute infarct.  Changes in keeping with her known CADASIL.  Neurology signed off.  -Could be a vasovagal component to her syncope given associated nausea as well.  Will saline lock fluids today.  -Mental status seems at baseline.     Acute UTI  -Urine culture currently growing greater than 100 K E.coli.  -Continue Rocephin. Will plan 5 day course total. Transition to oral cephalosporin tomorrow.     CADASIL  History of CVA  History of seizures  -Neurology evaluated.  -Continue home Lipitor, Plavix, Aricept and Namenda.  -Continue home Zonegran and Keppra.  -Seizure precautions.     Hypothyroidism  -Home replacement.     GERD  -PPI     Discussed with patient.    Likely back to facility tomorrow.      VTE Prophylaxis - SCDs  Code Status - No CPR/intubation.  Disposition - Anticipate discharge back to facility  as above.    KEMAL Riley  [facility]  06/08/24  09:27 EDT

## 2024-06-08 NOTE — PROGRESS NOTES
Neurology update    MRI reviewed. Extensive white matter disease consistent with CADASIL but no evidence of acute stroke. Etiology of her weakness and syncope unclear. May have been a vasovagal event.    No further inpatient neurologic workup needed. Will sign off.

## 2024-06-09 LAB
ANION GAP SERPL CALCULATED.3IONS-SCNC: 7 MMOL/L (ref 5–15)
BUN SERPL-MCNC: 15 MG/DL (ref 8–23)
BUN/CREAT SERPL: 19.5 (ref 7–25)
CALCIUM SPEC-SCNC: 8.7 MG/DL (ref 8.6–10.5)
CHLORIDE SERPL-SCNC: 107 MMOL/L (ref 98–107)
CO2 SERPL-SCNC: 28 MMOL/L (ref 22–29)
CREAT SERPL-MCNC: 0.77 MG/DL (ref 0.57–1)
DEPRECATED RDW RBC AUTO: 44.9 FL (ref 37–54)
EGFRCR SERPLBLD CKD-EPI 2021: 85.7 ML/MIN/1.73
ERYTHROCYTE [DISTWIDTH] IN BLOOD BY AUTOMATED COUNT: 14.6 % (ref 12.3–15.4)
GLUCOSE SERPL-MCNC: 93 MG/DL (ref 65–99)
HCT VFR BLD AUTO: 41.1 % (ref 34–46.6)
HGB BLD-MCNC: 12.7 G/DL (ref 12–15.9)
MCH RBC QN AUTO: 26 PG (ref 26.6–33)
MCHC RBC AUTO-ENTMCNC: 30.9 G/DL (ref 31.5–35.7)
MCV RBC AUTO: 84.2 FL (ref 79–97)
PLATELET # BLD AUTO: 208 10*3/MM3 (ref 140–450)
PMV BLD AUTO: 11.2 FL (ref 6–12)
POTASSIUM SERPL-SCNC: 4.4 MMOL/L (ref 3.5–5.2)
RBC # BLD AUTO: 4.88 10*6/MM3 (ref 3.77–5.28)
SODIUM SERPL-SCNC: 142 MMOL/L (ref 136–145)
WBC NRBC COR # BLD AUTO: 7.02 10*3/MM3 (ref 3.4–10.8)

## 2024-06-09 PROCEDURE — 25010000002 CEFTRIAXONE PER 250 MG: Performed by: INTERNAL MEDICINE

## 2024-06-09 PROCEDURE — 97530 THERAPEUTIC ACTIVITIES: CPT

## 2024-06-09 PROCEDURE — 85027 COMPLETE CBC AUTOMATED: CPT | Performed by: NURSE PRACTITIONER

## 2024-06-09 PROCEDURE — 80048 BASIC METABOLIC PNL TOTAL CA: CPT | Performed by: NURSE PRACTITIONER

## 2024-06-09 RX ADMIN — FLUOXETINE HYDROCHLORIDE 20 MG: 20 CAPSULE ORAL at 09:53

## 2024-06-09 RX ADMIN — DONEPEZIL HYDROCHLORIDE 10 MG: 10 TABLET, FILM COATED ORAL at 21:55

## 2024-06-09 RX ADMIN — LANSOPRAZOLE 15 MG: 15 TABLET, ORALLY DISINTEGRATING ORAL at 09:52

## 2024-06-09 RX ADMIN — GABAPENTIN 400 MG: 400 CAPSULE ORAL at 17:45

## 2024-06-09 RX ADMIN — LOSARTAN POTASSIUM 50 MG: 50 TABLET, FILM COATED ORAL at 09:52

## 2024-06-09 RX ADMIN — LEVETIRACETAM 500 MG: 500 TABLET, FILM COATED ORAL at 09:52

## 2024-06-09 RX ADMIN — MAGNESIUM OXIDE 400 MG (241.3 MG MAGNESIUM) TABLET 400 MG: TABLET at 09:53

## 2024-06-09 RX ADMIN — MEMANTINE HYDROCHLORIDE 10 MG: 10 TABLET, FILM COATED ORAL at 21:55

## 2024-06-09 RX ADMIN — ZONISAMIDE 100 MG: 100 CAPSULE ORAL at 09:52

## 2024-06-09 RX ADMIN — LEVOTHYROXINE SODIUM 112 MCG: 112 TABLET ORAL at 09:52

## 2024-06-09 RX ADMIN — LEVETIRACETAM 500 MG: 500 TABLET, FILM COATED ORAL at 21:55

## 2024-06-09 RX ADMIN — CEFTRIAXONE SODIUM 1000 MG: 1 INJECTION, POWDER, FOR SOLUTION INTRAMUSCULAR; INTRAVENOUS at 17:45

## 2024-06-09 RX ADMIN — ACETAMINOPHEN 325MG 650 MG: 325 TABLET ORAL at 17:46

## 2024-06-09 RX ADMIN — MEMANTINE HYDROCHLORIDE 5 MG: 10 TABLET, FILM COATED ORAL at 09:52

## 2024-06-09 RX ADMIN — CLOPIDOGREL BISULFATE 75 MG: 75 TABLET, FILM COATED ORAL at 09:51

## 2024-06-09 RX ADMIN — GABAPENTIN 400 MG: 400 CAPSULE ORAL at 09:51

## 2024-06-09 RX ADMIN — ATORVASTATIN CALCIUM 40 MG: 20 TABLET, FILM COATED ORAL at 21:55

## 2024-06-09 RX ADMIN — GABAPENTIN 400 MG: 400 CAPSULE ORAL at 21:55

## 2024-06-09 NOTE — THERAPY TREATMENT NOTE
Patient Name: Bhavana Teixeira  : 1960    MRN: [id number]                              Today's Date: 2024       Admit Date: 2024    Visit Dx:     ICD-10-CM ICD-9-CM   1. Altered mental status, unspecified altered mental status type  R41.82 780.97   2. Acute UTI  N39.0 599.0   3. 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)    Cervical radiculopathy    Degeneration of intervertebral disc of cervical region    Depression    Fracture of distal end of radius    Static tremor    Family history of colon cancer    Gastroesophageal reflux disease    Headache    History of respiratory system disease    History of total hysterectomy with bilateral salpingo-oophorectomy (BSO)    Hypothyroidism    Irritable bowel syndrome    Adiposity    Osteopenia    Partial epilepsy with impairment of consciousness    Encounter for preprocedural cardiovascular examination    Simple renal cyst    Lesion of vulva    Prolapse 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    Cerebrovascular accident    Seizures    Hemispheric carotid artery syndrome    Transient ischemic attack    Hypernatremia    Hyperglycemia    DNR (do not resuscitate)    Cerebrovascular accident (CVA)    Obesity (BMI 30-39.9)    Partial right third nerve palsy    Migraine    Sleep apnea    Altered mental status    History of CVA (cerebrovascular accident)    Syncope and collapse    Acute UTI     Past Medical History:   Diagnosis Date    Arthritis     Cervical spine disease     Degenerative joint disease (DJD) of lumbar spine     Depression     Migraine     Partial epilepsy with impairment of consciousness 2013    Seasonal allergies     Seizures     Sleep apnea     Thyroid disease      Past  Surgical History:   Procedure Laterality Date    BACK SURGERY      CHOLECYSTECTOMY      HYSTERECTOMY        General Information       Row Name 06/09/24 1440          Physical Therapy Time and Intention    Document Type therapy note (daily note)  -SV     Mode of Treatment individual therapy;physical therapy  -       Row Name 06/09/24 1440          General Information    Patient Profile Reviewed yes  -SV               User Key  (r) = Recorded By, (t) = Taken By, (c) = Cosigned By      Initials Name Provider Type    Julisa Pineda, PT Physical Therapist                   Mobility       Row Name 06/09/24 1502          Bed Mobility    Supine-Sit [place] (Bed Mobility) maximum assist (25% patient effort)  -SV     Sit-Supine [place] (Bed Mobility) maximum assist (25% patient effort);2 person assist  -SV     Assistive Device (Bed Mobility) bed rails;head of bed elevated  -       Row Name 06/09/24 1502          Sit-Stand Transfer    Sit-Stand [place] (Transfers) maximum assist (25% patient effort);2 person assist  3 trials, pt stood for 45 sec x2 with mod asst or mod/minx 1 almost erect posture , right lean  -SV     Comment, (Sit-Stand Transfer) chair placed infront of pt, she pulled to stand and leaned on chair, chair blocked  -SV               User Key  (r) = Recorded By, (t) = Taken By, (c) = Cosigned By      Initials Name Provider Type    Julisa Pineda PT Physical Therapist                   Obj/Interventions       Row Name 06/09/24 1504          Motor Skills    Therapeutic Exercise --  df/pf, resisted hip ext from aarom flex x7 azeb , encouraged BUE arom: flex, bicep curl ad enedelia  -SV       Row Name 06/09/24 1504          Balance    Balance Assessment sitting static balance  -SV     Static Sitting Balance minimal assist  -SV     Static Standing Balance moderate assist;2-person assist  -SV     Position/Device Used, Standing Balance --  chair  -SV               User Key  (r) = Recorded By, (t)  = Taken By, (c) = Cosigned By      Initials Name Provider Type    SV Julisa Hammer, PT Physical Therapist                   Goals/Plan    No documentation.                  Clinical Impression    No documentation.                  Outcome Measures       Row Name 06/09/24 1505          How much help from another person do you currently need...    Turning from your back to your side while in flat bed without using bedrails? 2  -SV     Moving from lying on back to sitting on the side of a flat bed without bedrails? 2  -SV     Moving to and from a bed to a chair (including a wheelchair)? 1  -SV     Standing up from a chair using your arms (e.g., wheelchair, bedside chair)? 2  -SV     Climbing 3-5 steps with a railing? 1  -SV     To walk in hospital room? 1  -SV     AM-PAC 6 Clicks Score (PT) 9  -SV     Highest Level of Mobility Goal 3 --> Sit at edge of bed  -SV               User Key  (r) = Recorded By, (t) = Taken By, (c) = Cosigned By      Initials Name Provider Type    SV Julisa Hammer, PT Physical Therapist                                 Physical Therapy Education       Title: PT OT SLP Therapies (In Progress)       Topic: Physical Therapy (Done)       Point: Mobility training (Done)       Learning Progress Summary             Patient Acceptance, E,D, VU,NR by MS at 6/7/2024 0912                         Point: Home exercise program (Done)       Learning Progress Summary             Patient Acceptance, E,D, VU,NR by MS at 6/7/2024 0912                         Point: Body mechanics (Done)       Learning Progress Summary             Patient Acceptance, E,D, VU,NR by MS at 6/7/2024 0912                         Point: Precautions (Done)       Learning Progress Summary             Patient Acceptance, E,D, VU,NR by MS at 6/7/2024 0912                                         User Key       Initials Effective Dates Name Provider Type Discipline    MS 06/16/21 -  Jake Segal, PT Physical Therapist PT                   PT Recommendation and Plan           Time Calculation:         PT Charges       Row Name 06/09/24 1509             Time Calculation    Start Time 1440  -SV      Stop Time 1503  -SV      Time Calculation (min) 23 min  -SV      PT Received On 06/09/24  -SV      PT - Next Appointment 06/10/24  -SV                User Key  (r) = Recorded By, (t) = Taken By, (c) = Cosigned By      Initials Name Provider Type    SV Julisa Hammer, PT Physical Therapist                  Therapy Charges for Today       Code Description Service Date Service Provider Modifiers Qty    03167054442 HC PT THERAPEUTIC ACT EA 15 MIN 6/9/2024 Julisa Hammer, PT GP 2    39371264707 HC PT THER SUPP EA 15 MIN 6/9/2024 Julisa Hammer, PT GP 2            PT G-Codes  Outcome Measure Options: AM-PAC 6 Clicks Daily Activity (OT), Modified Gigi  AM-PAC 6 Clicks Score (PT): 9  AM-PAC 6 Clicks Score (OT): 12  Modified Gigi Scale: 4 - Moderately severe disability.  Unable to walk without assistance, and unable to attend to own bodily needs without assistance.       Julisa Hammer, PT  6/9/2024

## 2024-06-09 NOTE — PROGRESS NOTES
Name: Bhavana Teixeira ADMIT: 2024   : 1960  PCP: Naya MayfieldKEMAL    MRN: [id number] LOS: 3 days   AGE/SEX: 63 y.o. female  ROOM: [facility]     Subjective   Subjective   Resting in bed.  No new complaints     Objective   Objective   Vital Signs  Temp:  [98.1 °F (36.7 °C)-98.4 °F (36.9 °C)] 98.4 °F (36.9 °C)  Heart Rate:  [67-81] 69  Resp:  [16] 16  BP: (138-165)/(82-94) 152/89  SpO2:  [90 %-100 %] 90 %  on   ;   Device (Oxygen Therapy): room air  Body mass index is 33.89 kg/m².    Constitutional:       Appearance: She is obese. She is ill-appearing.  Cardiovascular:      Rate and Rhythm: Normal rate and regular rhythm.   Pulmonary:      Effort: Pulmonary effort is normal. No respiratory distress.   Skin:     General: Skin is warm and dry.      Findings: No bruising.   Neurological:      Mental Status: She is alert and oriented to person, place, and time.      Comments: Delayed verbal response at times. No focal deficits but reports her right side is baseline weaker.   Psychiatric:         Mood and Affect: Mood normal.         Behavior: Behavior normal.     Results Review:       I reviewed the patient's new clinical results.  Results from last 7 days   Lab Units 24  0520 24  0618 24  0638 24  1501   WBC 10*3/mm3 7.02 6.09 6.79 6.07   HEMOGLOBIN g/dL 12.7 12.6 12.9 13.4   PLATELETS 10*3/mm3 208 202 200 223     Results from last 7 days   Lab Units 24  0520 24  0618 24  0638 24  1501   SODIUM mmol/L 142 143 141 143   POTASSIUM mmol/L 4.4 3.9 3.8 4.0   CHLORIDE mmol/L 107 111* 108* 108*   CO2 mmol/L 28.0 24.4 23.5 27.0   BUN mg/dL 15 11 13 17   CREATININE mg/dL 0.77 0.64 0.58 0.86   GLUCOSE mg/dL 93 103* 91 109*   CrCl cannot be calculated (Unknown ideal weight.).  Results from last 7 days   Lab Units 24  1501   ALBUMIN g/dL 4.0   BILIRUBIN mg/dL 0.2   ALK PHOS U/L 205*   AST (SGOT) U/L 30   ALT (SGPT) U/L 21     Results from last 7 days  "  Lab Units 06/09/24  0520 06/08/24  0618 06/07/24  0638 06/06/24  1501   CALCIUM mg/dL 8.7 8.9 8.9 9.3   ALBUMIN g/dL  --   --   --  4.0   MAGNESIUM mg/dL  --   --   --  1.9     Results from last 7 days   Lab Units 06/06/24  1726   LACTATE mmol/L 1.5     No results found for: \"HGBA1C\", \"POCGLU\"    atorvastatin, 40 mg, Oral, Nightly  cefTRIAXone, 1,000 mg, Intravenous, Q24H  clopidogrel, 75 mg, Oral, Daily  donepezil, 10 mg, Oral, Nightly  FLUoxetine, 20 mg, Oral, Daily  gabapentin, 400 mg, Oral, TID  lansoprazole, 15 mg, Oral, Daily  levETIRAcetam, 500 mg, Oral, Q12H  levothyroxine, 112 mcg, Oral, Daily  losartan, 50 mg, Oral, Daily  magnesium oxide, 400 mg, Oral, Daily  memantine, 10 mg, Oral, Nightly  memantine, 5 mg, Oral, QAM  zonisamide, 100 mg, Oral, Daily         Diet: Cardiac; Healthy Heart (2-3 Na+); Fluid Consistency: Thin (IDDSI 0)       Assessment/Plan     Active Hospital Problems    Diagnosis  POA    **Syncope and collapse [R55]  Yes    History of CVA (cerebrovascular accident) [Z86.73]  Not Applicable    Acute UTI [N39.0]  Yes    Altered mental status [R41.82]  Yes    Sleep apnea [G47.30]  Yes    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     Ms. Teixeira is a 63 y.o. female who presented to the emergency room after a syncopal episode while at her facility eating lunch.  She briefly lost consciousness and was easily aroused when returning to her room.  She has had some associated nausea.  Family apparently reported that she had been getting progressively weaker over the last week or 2 with associated lethargy.  She is baseline wheelchair-bound for progressive neurological disease.  She was found to have a urinary tract infection on admission.     Syncope and collapse  Altered mental " status  -Suspect mostly metabolic in nature secondary to underlying UTI.  -However, she does have underlying progressive neurological disorder with new reports of vision blurriness as well as history of seizures. Neurology evaluated. MRI brain obtained with no acute infarct.  Changes in keeping with her known CADASIL.  Neurology signed off.  -Could be a vasovagal component to her syncope given associated nausea as well.    -Mental status seems at baseline.     Acute UTI  -Urine culture currently growing greater than 100 K E.coli.  -Continue Rocephin. Will plan 5 day course total. Transition to cefdinir at discharge.      CADASIL  History of CVA  History of seizures  -Neurology evaluated.  -Continue home Lipitor, Plavix, Aricept and Namenda.  -Continue home Zonegran and Keppra.  -Seizure precautions.     Hypothyroidism  -Home replacement.     GERD  -PPI     Discussed with patient.       VTE Prophylaxis - SCDs  Code Status - No CPR/intubation.  Disposition - Anticipate discharge back to facility when arrangements have been made

## 2024-06-10 VITALS
SYSTOLIC BLOOD PRESSURE: 132 MMHG | WEIGHT: 222.88 LBS | TEMPERATURE: 97.5 F | DIASTOLIC BLOOD PRESSURE: 83 MMHG | HEART RATE: 72 BPM | RESPIRATION RATE: 16 BRPM | BODY MASS INDEX: 33.89 KG/M2 | OXYGEN SATURATION: 97 %

## 2024-06-10 RX ORDER — CEPHALEXIN 500 MG/1
500 CAPSULE ORAL 4 TIMES DAILY
Qty: 8 CAPSULE | Refills: 0 | Status: SHIPPED | OUTPATIENT
Start: 2024-06-10 | End: 2024-06-10

## 2024-06-10 RX ORDER — ZONISAMIDE 100 MG/1
100 CAPSULE ORAL DAILY
Start: 2024-06-10

## 2024-06-10 RX ORDER — CEPHALEXIN 500 MG/1
500 CAPSULE ORAL 4 TIMES DAILY
Qty: 8 CAPSULE | Refills: 0 | Status: SHIPPED | OUTPATIENT
Start: 2024-06-10 | End: 2024-06-12

## 2024-06-10 RX ORDER — MEMANTINE HYDROCHLORIDE 5 MG/1
5 TABLET ORAL EVERY MORNING
Start: 2024-06-10

## 2024-06-10 RX ORDER — LEVETIRACETAM 500 MG/1
500 TABLET ORAL EVERY 12 HOURS SCHEDULED
Start: 2024-06-10

## 2024-06-10 RX ADMIN — FLUOXETINE HYDROCHLORIDE 20 MG: 20 CAPSULE ORAL at 09:32

## 2024-06-10 RX ADMIN — MAGNESIUM OXIDE 400 MG (241.3 MG MAGNESIUM) TABLET 400 MG: TABLET at 09:32

## 2024-06-10 RX ADMIN — MEMANTINE HYDROCHLORIDE 5 MG: 10 TABLET, FILM COATED ORAL at 06:31

## 2024-06-10 RX ADMIN — CLOPIDOGREL BISULFATE 75 MG: 75 TABLET, FILM COATED ORAL at 09:32

## 2024-06-10 RX ADMIN — LANSOPRAZOLE 15 MG: 15 TABLET, ORALLY DISINTEGRATING ORAL at 09:32

## 2024-06-10 RX ADMIN — LOSARTAN POTASSIUM 50 MG: 50 TABLET, FILM COATED ORAL at 09:32

## 2024-06-10 RX ADMIN — LEVOTHYROXINE SODIUM 112 MCG: 112 TABLET ORAL at 09:32

## 2024-06-10 RX ADMIN — ZONISAMIDE 100 MG: 100 CAPSULE ORAL at 09:32

## 2024-06-10 RX ADMIN — GABAPENTIN 400 MG: 400 CAPSULE ORAL at 09:32

## 2024-06-10 RX ADMIN — LEVETIRACETAM 500 MG: 500 TABLET, FILM COATED ORAL at 09:32

## 2024-06-10 NOTE — CASE MANAGEMENT/SOCIAL WORK
"Physicians Statement of Medical Necessity for  Ambulance Transportation    GENERAL INFORMATION     Name: Bhavana Teixeira  YOB: 1960  Medicare #: [id number]  Transport Date: 6/10/2024 (Valid for round trips this date, or for scheduled repetitive trips for 60 days from the date signed below.)  Origin: [place] [address]  Destination: [address] [place] [address], [facility]  Is the Patient's stay covered under Medicare Part A (PPS/DRG?)No   Closest appropriate facility? Yes  If this a hosp-hosp transfer? No  Is this a hospice patient? No    MEDICAL NECESSITY QUESTIONAIRE    Ambulance Transportation is medically necessary only if other means of transportation are contraindicated or would be potentially harmful to the patient.  To meet this requirement, the patient must be either \"bed confined\" or suffer from a condition such that transport by means other than an ambulance is contraindicated by the patient's condition.  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) [I67.850]   Seizures [R56.9]     Gastroesophageal reflux disease [K21.9]     Hypothyroidism [E03.9]            Past Medical History:   Diagnosis Date    Arthritis     Cervical spine disease     Degenerative joint disease (DJD) of lumbar spine     Depression     Migraine     Partial epilepsy with impairment of consciousness 1/4/2013    Seasonal allergies     Seizures     Sleep apnea     Thyroid disease     " "  Past Surgical History:   Procedure Laterality Date    BACK SURGERY      CHOLECYSTECTOMY      HYSTERECTOMY        2) Is this patient \"bed confined\" as defined below?Yes   To be \"bed confined\" the patient must satisfy all three of the following criteria:  (1) unable to get up from bed without assistance; AND (2) unable to ambulate;  AND (3) unable to sit in a chair or wheelchair.  3) Can this patient safely be transported by car or wheelchair van (I.e., may safely sit during transport, without an attendant or monitoring?)No   4. In addition to completing questions 1-3 above, please check any of the following conditions that apply*:          *Note: supporting documentation for any boxes checked must be maintained in the patient's medical records Unable to tolerate seated position for time needed to transport      SIGNATURE OF PHYSICIAN OR OTHER AUTHORIZED HEALTHCARE PROFESSIONAL    I certify that the above information is true and correct based on my evaluation of this patient, and represent that the patient requires transport by ambulance and that other forms of transport are contraindicated.  I understand that this information will be used by the Centers for Medicare and Medicaid Services (CMS) to support the determiniation of medical necessity for ambulance services, and I represent that I have personal knowledge of the patient's condition at the time of transport.       If this box is checked, I also certify that the patient is physically or mentally incapable of signing the ambulance service's claim form and that the institution with which I am affiliated has furnished care, services or assistance to the patient.  My signature below is made on behalf of the patient pursuant to 42 .36(b)(4). In accordance with 42 .37, the specific reason(s) that the patient is physically or mentally incapable of signing the claim for is as follows:     Signature of Physician or Healthcare Professional  Date/Time:    "     (For Scheduled repetitive transport, this form is not valid for transports performed more than 60 days after this date).                                                                                                                                            --------------------------------------------------------------------------------------------  Printed Name and Credentials of Physician or Authorized Healthcare Professional     *Form must be signed by patient's attending physician for scheduled, repetitive transports,.  For non-repetitive ambulance transports, if unable to obtain the signature of the attending physician, any of the following may sign (please select below):     Physician  Clinical Nurse Specialist  Registered Nurse     Physician Assistant  Discharge Planner  Licensed Practical Nurse     Nurse Practitioner

## 2024-06-10 NOTE — DISCHARGE SUMMARY
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        Hospital Course     Ms. Teixeira is a 63 y.o. female with a history of CADASIL, stroke, seizure disorder presenting with a syncopal episode while eating lunch where she briefly lost consciousness and was aroused when returning to her room.  Some associated nausea.  Patient was found to have acute UTI with E. coli.  Plan to discharge on 2 days of Keflex to complete a 5-day course of antibiotics.  Neurology was consulted and MRI brain was done with no acute infarcts.  Likely a vasovagal episode.  Plan to discharge back to her facility today.    At the time of discharge patient was told to take all medications as prescribed, keep all follow-up appointments, and call their doctor or return to the hospital with any worsening or concerning symptoms.    Day of Discharge     Subjective:  Patient resting comfortably in bed.  Was sleeping comfortably prior to arrival.  No fevers, chills, difficulty breathing, chest pain,  nausea, vomiting, abdominal pain.  Patient without complaints.  Eager to leave the hospital.    Review of Systems  As above  Physical Exam:  Temp:  [97.5 °F (36.4 °C)-98.2 °F (36.8 °C)] 97.5 °F (36.4 °C)  Heart Rate:  [65-81] 72  Resp:  [16]  16  BP: (113-154)/(64-96) 132/83  Body mass index is 33.89 kg/m².  Physical Exam  Constitutional:       General: She is not in acute distress.     Appearance: She is obese. She is not ill-appearing.   Cardiovascular:      Rate and Rhythm: Normal rate and regular rhythm.   Pulmonary:      Effort: Pulmonary effort is normal. No respiratory distress.   Abdominal:      General: Abdomen is flat. There is no distension.      Tenderness: There is no abdominal tenderness.   Musculoskeletal:         General: No swelling or deformity. Normal range of motion.   Skin:     General: Skin is warm and dry.   Neurological:      Mental Status: She is alert and oriented to person, place, and time. Mental status is at baseline.         Consultants     Consult Orders (all) (From admission, onward)       Start     Ordered    06/07/24 1325  Inpatient Neurology Consult General  Once        Specialty:  Neurology  Provider:  Juan A Amado MD    06/07/24 1325    06/06/24 1553  [facility] (on-call MD unless specified) Details  Once        Specialty:  Hospitalist  Provider:  Angelina Dobson MD    06/06/24 1552                  Procedures     Imaging Results (All)       Procedure Component Value Units Date/Time    MRI Brain Without Contrast [[id number]] Collected: 06/08/24 0348     Updated: 06/08/24 0400    Narrative:      BRAIN MRI WITHOUT CONTRAST     HISTORY: CADASIL, acute generalized weakness; R41.82-Altered mental  status, unspecified; N39.0-Urinary tract infection, site not specified;  I67.850-Cerebral autosomal dominant arteriopathy with subcortical  infarcts and leukoencephalopathy     COMPARISON: January 7, 2018.     FINDINGS:  Multiplanar images of the head were obtained without  gadolinium. No areas of restricted diffusion are seen to suggest acute  infarct. There is atrophy. There is extensive FLAIR and T2 signal  hyperintensity involving the white matter. There is involvement of the  anterior temporal lobes, medial  frontal lobes, and external capsules,  which would be in keeping with history of CADASIL. There is no midline  shift or mass effect. There is relatively advanced atrophy for the age  of 63. Innumerable foci of susceptibility artifact are seen throughout  the brain. These would be in keeping with chronic hemosiderin deposition  and petechial microhemorrhages. The number has increased when compared  to prior study. Intracranial flow voids appear intact. Mucous retention  cyst is noted within the left maxillary sinus. There is mucosal  thickening within the ethmoid sinuses. Somewhat protuberant appearance  to the pituitary gland appears stable when compared to prior study.          Impression:      1. No areas of restricted diffusion are seen to suggest acute infarct.  2. There are changes in keeping with CADASIL. Innumerable areas of  susceptibility artifact are seen throughout the brain, in keeping with  multiple microhemorrhages. Number has increased when compared to prior  study.        This report was finalized on 6/8/2024 3:57 AM by Dr. Arielle Robison M.D on Workstation: [id number]       CT Head Without Contrast [[id number]] Collected: 06/06/24 1611     Updated: 06/06/24 1809    Narrative:      CT HEAD WITHOUT CONTRAST     HISTORY: Altered mental status, progressive weakness.     COMPARISON: Comparison is made to the CT examination 04/27/2023.     FINDINGS: The brain and ventricles are symmetrical. There is extensive  small vessel ischemic disease. No focal area of decreased attenuation to  suggest acute infarction is identified. Mild vascular calcification is  noted. There is no evidence of hemorrhage, hydrocephalus or of abnormal  extra-axial fluid.       Impression:      No acute process is identified. Further evaluation could be  performed with a MRI examination of the brain as indicated.           Radiation dose reduction techniques were utilized, including automated  exposure control and exposure  "modulation based on body size.        This report was finalized on 6/6/2024 6:05 PM by Dr. Peter Fontaine M.D  on Workstation: [id number]       XR Chest 1 View [[id number]] Collected: 06/06/24 1526     Updated: 06/06/24 1531    Narrative:      XR CHEST 1 VW-     HISTORY: Female who is 63 years-old, weakness     TECHNIQUE: Frontal view of the chest     COMPARISON: 9/20/2022     FINDINGS: The heart size is borderline. Pulmonary vasculature is  unremarkable. No focal pulmonary consolidation, pleural effusion, or  pneumothorax. No acute osseous process.       Impression:      No focal pulmonary consolidation. Borderline heart size.  Follow-up as clinical indications persist.     This report was finalized on 6/6/2024 3:28 PM by Dr. Luis Alfredo Lagunas M.D on Workstation: [id number]               Pertinent Labs     Results from last 7 days   Lab Units 06/09/24  0520 06/08/24  0618 06/07/24  0638 06/06/24  1501   WBC 10*3/mm3 7.02 6.09 6.79 6.07   HEMOGLOBIN g/dL 12.7 12.6 12.9 13.4   PLATELETS 10*3/mm3 208 202 200 223     Results from last 7 days   Lab Units 06/09/24  0520 06/08/24  0618 06/07/24  0638 06/06/24  1501   SODIUM mmol/L 142 143 141 143   POTASSIUM mmol/L 4.4 3.9 3.8 4.0   CHLORIDE mmol/L 107 111* 108* 108*   CO2 mmol/L 28.0 24.4 23.5 27.0   BUN mg/dL 15 11 13 17   CREATININE mg/dL 0.77 0.64 0.58 0.86   GLUCOSE mg/dL 93 103* 91 109*   CrCl cannot be calculated (Unknown ideal weight.).  Results from last 7 days   Lab Units 06/06/24  1501   ALBUMIN g/dL 4.0   BILIRUBIN mg/dL 0.2   ALK PHOS U/L 205*   AST (SGOT) U/L 30   ALT (SGPT) U/L 21     Results from last 7 days   Lab Units 06/09/24  0520 06/08/24  0618 06/07/24  0638 06/06/24  1501   CALCIUM mg/dL 8.7 8.9 8.9 9.3   ALBUMIN g/dL  --   --   --  4.0   MAGNESIUM mg/dL  --   --   --  1.9       Results from last 7 days   Lab Units 06/06/24  1726 06/06/24  1501   HSTROP T ng/L 12 13           Invalid input(s): \"LDLCALC\"  Results from last 7 days   Lab Units " 06/06/24  1726 06/06/24  1515   BLOODCX  No growth at 3 days  No growth at 3 days  --    URINECX   --  >100,000 CFU/mL Escherichia coli*       Test Results Pending at Discharge     Pending Labs       Order Current Status    Blood Culture - Blood, Hand, Left Preliminary result    Blood Culture - Blood, Hand, Right Preliminary result            Discharge Details        Discharge Medications        New Medications        Instructions Start Date   cephalexin 500 MG capsule  Commonly known as: Keflex   500 mg, Oral, 4 Times Daily             Changes to Medications        Instructions Start Date   atorvastatin 40 MG tablet  Commonly known as: LIPITOR  What changed: Another medication with the same name was removed. Continue taking this medication, and follow the directions you see here.   40 mg, Oral, Nightly             Continue These Medications        Instructions Start Date   acetaminophen 500 MG tablet  Commonly known as: TYLENOL   1,000 mg, Oral, 2 Times Daily      aspirin 325 MG EC tablet   325 mg, Oral, Daily      clopidogrel 75 MG tablet  Commonly known as: PLAVIX   75 mg, Oral, Daily      Coenzyme Q10 400 MG capsule   1 capsule, Oral, Daily      cyanocobalamin 1000 MCG/ML injection   1,000 mcg, Intramuscular, Every 28 Days      donepezil 10 MG tablet  Commonly known as: ARICEPT   10 mg, Oral, Nightly      FLUoxetine 20 MG capsule  Commonly known as: PROzac   20 mg, Oral, Daily      fluticasone 50 MCG/ACT nasal spray  Commonly known as: FLONASE   2 sprays, Nasal, Daily      gabapentin 400 MG capsule  Commonly known as: NEURONTIN   400 mg, Oral, 3 Times Daily      lansoprazole 30 MG Tablet Delayed Release Dispersible disintegrating tablet  Commonly known as: PREVACID SOLUTAB   15 mg, Oral, Daily      levETIRAcetam 500 MG tablet  Commonly known as: KEPPRA   500 mg, Oral, Every 12 Hours Scheduled      levothyroxine 112 MCG tablet  Commonly known as: SYNTHROID, LEVOTHROID   112 mcg, Oral, Daily      loperamide 2 MG  capsule  Commonly known as: IMODIUM   2 mg, Oral, Every 8 Hours PRN      losartan 50 MG tablet  Commonly known as: COZAAR   50 mg, Oral, Daily      magnesium oxide 400 MG tablet  Commonly known as: MAG-OX   400 mg, Oral, Daily      meloxicam 15 MG tablet  Commonly known as: MOBIC   15 mg, Oral, Daily      memantine 5 MG tablet  Commonly known as: NAMENDA   5 mg, Oral, Every Morning      memantine 10 MG tablet  Commonly known as: NAMENDA   10 mg, Oral, Nightly      metFORMIN 500 MG tablet  Commonly known as: GLUCOPHAGE   500 mg, Oral, Daily      methocarbamol 500 MG tablet  Commonly known as: ROBAXIN   2 tablets, Oral, Every 8 Hours PRN      Mirabegron ER 50 MG tablet sustained-release 24 hour 24 hr tablet  Commonly known as: MYRBETRIQ   50 mg, Oral, Daily      MULTIVITAMIN & MINERAL PO   1 tablet, Oral, Daily      Premarin 0.625 MG/GM vaginal cream  Generic drug: conjugated estrogens   0.25 applicators, Vaginal, Weekly      triamterene-hydrochlorothiazide 37.5-25 MG per tablet  Commonly known as: MAXZIDE-25   1 tablet, Oral      zonisamide 100 MG capsule  Commonly known as: ZONEGRAN   100 mg, Oral, Daily             Stop These Medications      sulfamethoxazole-trimethoprim 800-160 MG per tablet  Commonly known as: BACTRIM DS,SEPTRA DS              Allergies   Allergen Reactions    Amlodipine Swelling    Amlodipine Besylate Swelling    Penicillins Hives    Lisinopril Other (See Comments)     COUGH    Metoclopramide Other (See Comments)     SLEEPY    Oxycodone Nausea Only         Discharge Disposition:  Skilled Nursing Facility (DC - External)    Discharge Diet:  Diet Order   Procedures    Diet: Cardiac; Healthy Heart (2-3 Na+); Fluid Consistency: Thin (IDDSI 0)       Discharge Activity:   As tolerated    CODE STATUS:    Code Status and Medical Interventions:   Ordered at: 06/06/24 2040     Medical Intervention Limits:    No intubation (DNI)     Code Status (Patient has no pulse and is not breathing):    No CPR (Do  Not Attempt to Resuscitate)     Medical Interventions (Patient has pulse or is breathing):    Limited Support       No future appointments.   Follow-up Information       Naya Mayfield, APRN .    Specialty: Family Medicine  Contact information:  [address] [facility] [address] [phone]                             Time Spent on Discharge:  Greater than 30 minutes      Vincent Jones MD  [facility]  06/10/24  09:07 EDT

## 2024-06-10 NOTE — PLAN OF CARE
Problem: Skin Injury Risk Increased  Goal: Skin Health and Integrity  Outcome: Ongoing, Progressing     Problem: Fall Injury Risk  Goal: Absence of Fall and Fall-Related Injury  Outcome: Ongoing, Progressing  Intervention: Promote Injury-Free Environment  Recent Flowsheet Documentation  Taken 6/7/2024 0900 by Maggy Pritchett RN  Safety Promotion/Fall Prevention:   fall prevention program maintained   assistive device/personal items within reach   clutter free environment maintained   safety round/check completed   room organization consistent   nonskid shoes/slippers when out of bed     Problem: Adult Inpatient Plan of Care  Goal: Plan of Care Review  Outcome: Ongoing, Progressing  Goal: Patient-Specific Goal (Individualized)  Outcome: Ongoing, Progressing  Goal: Absence of Hospital-Acquired Illness or Injury  Outcome: Ongoing, Progressing  Intervention: Identify and Manage Fall Risk  Recent Flowsheet Documentation  Taken 6/7/2024 0900 by Maggy Pritchett RN  Safety Promotion/Fall Prevention:   fall prevention program maintained   assistive device/personal items within reach   clutter free environment maintained   safety round/check completed   room organization consistent   nonskid shoes/slippers when out of bed  Goal: Optimal Comfort and Wellbeing  Outcome: Ongoing, Progressing  Goal: Readiness for Transition of Care  Outcome: Ongoing, Progressing   Goal Outcome Evaluation:                                              
Goal Outcome Evaluation:                 Pt alert and agreeable to PT today. Max asst x1 for sup to sit eob. Min asst for sit balance prior to STS . Right lean noted in sitting. Room chair placed in front of pt and blocked by PT. Pt STS with mod/max of 2 . Attempting erect posture for 45 seconds: slight knee and trunk flexion and right virgilio,. Three standing trials. Sit bal between trials cga. Pt able to stand almost erect with right lean and mod x2 for 45 sec on 3rd trial. SEveral bed ex prior to eob. Some improvement noted. Pt encouraged to perform BUE ex ad enedelia.                              
Goal Outcome Evaluation:              Outcome Evaluation: Patient a/o x4, cooperative with care. Pt looking forward to d/c Monday am. Needed repositioned a few times as pt leans to R side. All meds given whole with water. No new issues noted.                               
Goal Outcome Evaluation:              Outcome Evaluation: Patient a/o x4, cooperative with care. Pt went down for MRI this shift. All meds given as ordered. Pt has purewick in place, incont b/b. Pt is slow to respond, but answers appropriately. No new issues noted. See v/s and labs. This RN wore appropriate PPE during care.                               
Goal Outcome Evaluation:              Outcome Evaluation: Patient a/o x4, slow to respond, aphasic. All meds given as ordered, whole with thins. No new issues noted. Pt encouraged to weight shift, weight shifting assistance provided. No new issues noted. This RN wore appropriate PPE during care. See v/s and labs. Call light within reach.                               
Goal Outcome Evaluation:      A&OX4. Speech slurred/ slow. Wheelchair bound at baseline. Incontinent X2. BM this shift. IV fluids infusing at 75mL/hr. Tray needs set up but patient can feed herself. Blanchable redness noted on bottom.                                        
Goal Outcome Evaluation:   Vitals stable, continue IV abx, room air, GCS 15.  at bedside. Plan to discharge back to facility at discharge.                                            
Goal Outcome Evaluation:  Plan of Care Reviewed With: patient           Outcome Evaluation: Pt is a 63 y.o female admitted 6/6 with weakness and after becoming unresponsive at the dining room at her nursing home; she regained consciousness quickly; weakness has progressed over the last week. She has a hx of stroke. She is w/c bound at baseline, she reports she stand pivots for her ADLs with assistance. Pt weak BUE, she requires max AX2 to stand today, not yet able to pivot at this time. She appears weaker than her stated baseline. Will continue to focus on ADL transfers/independence to return to facility at [facility].      Anticipated Discharge Disposition (OT): skilled nursing facility, extended care facility                        
Goal Outcome Evaluation:  Plan of Care Reviewed With: patient         Patient is a 63 year old Female admitted from a N.. with AMS and decreased responsiveness.  Patient reports that prior to admission her primary means of mobility is via W/C and that she was able to stand pivot to/from W/C with assist x 1.  Patient currently presents with decreased strength, decreased balance, and decreased tolerance to functional activity. This AM, pt. Requires Max. Assist x 1-2 for bed mobility and Max. Assist x 2 for sit <-> stand transfers.  BLE exercise program x 5 reps completed for general strengthening.  Patient will benefit from skilled inpt. P.T. to address her functional deficits and to assist patient in regaining her maximum level of independence with functional mobility.          Anticipated Discharge Disposition (PT): extended care facility, skilled nursing facility                        
Goal Outcome Evaluation:  Plan of Care Reviewed With: patient        Progress: improving     No new events during this shift. Patient seen by Dr Jones and ok to d/c to SNF, pt will be going to [facility] via [facility], this nurse attempted to call report to [phone], no nurse available, phone number given to Monique for nurse to call back to get report.  
37.2

## 2024-06-10 NOTE — CASE MANAGEMENT/SOCIAL WORK
Case Management Discharge Note      Final Note: Patients spouse stated that patient has LTC bed at [facility] and they do not want her returning to [facility]. CCP called Lilli Galvan with [facility] and she stated that patient does have a bed available there. [facility] @ 1 to [facility]. Packet given to nurse. Pharmacy fixed and Dr. Jones sent scripts to correct pharmacy.    Provided Post Acute Provider List?: N/A  Provided Post Acute Provider Quality & Resource List?: N/A    Selected Continued Care - Admitted Since 6/6/2024       Destination    No services have been selected for the patient.                Durable Medical Equipment    No services have been selected for the patient.                Dialysis/Infusion    No services have been selected for the patient.                Home Medical Care    No services have been selected for the patient.                Therapy    No services have been selected for the patient.                Community Resources    No services have been selected for the patient.                Community & DME    No services have been selected for the patient.                    Transportation Services  W/C Davy: Matt Bhatti    Final Discharge Disposition Code: 04 - intermediate care facility

## 2024-06-11 LAB
BACTERIA SPEC AEROBE CULT: NORMAL
BACTERIA SPEC AEROBE CULT: NORMAL